# Patient Record
Sex: FEMALE | Race: WHITE | Employment: OTHER | URBAN - METROPOLITAN AREA
[De-identification: names, ages, dates, MRNs, and addresses within clinical notes are randomized per-mention and may not be internally consistent; named-entity substitution may affect disease eponyms.]

---

## 2018-03-23 ENCOUNTER — HOSPITAL ENCOUNTER (OUTPATIENT)
Dept: MAMMOGRAPHY | Age: 73
Discharge: HOME | End: 2018-03-23
Attending: FAMILY MEDICINE
Payer: MEDICARE

## 2018-03-23 ENCOUNTER — HOSPITAL ENCOUNTER (OUTPATIENT)
Dept: BONE DENSITY | Age: 73
Discharge: HOME | End: 2018-03-23
Attending: NURSE PRACTITIONER
Payer: MEDICARE

## 2018-03-23 DIAGNOSIS — Z78.0 POST-MENOPAUSAL: ICD-10-CM

## 2018-03-23 DIAGNOSIS — Z12.39 BREAST CANCER SCREENING: ICD-10-CM

## 2018-03-23 PROCEDURE — 77080 DXA BONE DENSITY AXIAL: CPT

## 2018-03-23 PROCEDURE — 77067 SCR MAMMO BI INCL CAD: CPT

## 2018-04-09 ENCOUNTER — HOSPITAL ENCOUNTER (OUTPATIENT)
Dept: MAMMOGRAPHY | Age: 73
Discharge: HOME | End: 2018-04-09
Attending: FAMILY MEDICINE
Payer: MEDICARE

## 2018-04-09 ENCOUNTER — HOSPITAL ENCOUNTER (OUTPATIENT)
Dept: ULTRASOUND IMAGING | Age: 73
Discharge: HOME | End: 2018-04-09
Attending: FAMILY MEDICINE
Payer: MEDICARE

## 2018-04-09 DIAGNOSIS — R92.8 ABNORMAL MAMMOGRAM: ICD-10-CM

## 2018-04-09 PROCEDURE — 77065 DX MAMMO INCL CAD UNI: CPT

## 2018-04-12 ENCOUNTER — HOSPITAL ENCOUNTER (OUTPATIENT)
Dept: MAMMOGRAPHY | Age: 73
Discharge: HOME | End: 2018-04-12
Attending: FAMILY MEDICINE
Payer: MEDICARE

## 2018-04-12 DIAGNOSIS — R92.8 ABNORMAL MAMMOGRAM: ICD-10-CM

## 2018-04-12 DIAGNOSIS — R92.0 MAMMOGRAPHIC MICROCALCIFICATION: ICD-10-CM

## 2018-04-12 PROCEDURE — 77065 DX MAMMO INCL CAD UNI: CPT

## 2018-04-23 ENCOUNTER — OFFICE VISIT (OUTPATIENT)
Dept: SURGERY | Age: 73
End: 2018-04-23

## 2018-04-23 VITALS — RESPIRATION RATE: 18 BRPM

## 2018-04-23 DIAGNOSIS — R92.0 MICROCALCIFICATIONS OF THE BREAST: Primary | ICD-10-CM

## 2018-04-23 RX ORDER — METOPROLOL TARTRATE 50 MG/1
50 TABLET ORAL DAILY
COMMUNITY

## 2018-04-23 RX ORDER — SIMVASTATIN 40 MG/1
40 TABLET, FILM COATED ORAL
COMMUNITY

## 2018-04-23 RX ORDER — METFORMIN HYDROCHLORIDE 500 MG/1
500 TABLET ORAL 2 TIMES DAILY WITH MEALS
COMMUNITY

## 2018-04-23 RX ORDER — LISINOPRIL AND HYDROCHLOROTHIAZIDE 12.5; 2 MG/1; MG/1
1 TABLET ORAL DAILY
COMMUNITY

## 2018-04-23 NOTE — MR AVS SNAPSHOT
[address] [facility] 
 
 
 [address] [facility] [address] 
[phone] Patient: Alina Holly MRN: [id number] :1945 Visit Information Date & Time Provider Department Dept. Phone Encounter #  
 2018  8:30 AM Osmin King MD [facility] [phone] Your Appointments 10/22/2018  9:45 AM  
Follow Up with Osmin King MD  
[address] [place] [facility]: 6 month fu  
 [address] [facility] [address]  
[phone]  
  
   
 [address] [facility] [address] Upcoming Health Maintenance Date Due Hepatitis C Screening 1945 DTaP/Tdap/Td series (1 - Tdap) 1968 FOBT Q 1 YEAR AGE 50-75 1997 ZOSTER VACCINE AGE 60> 2007 GLAUCOMA SCREENING Q2Y 2012 Pneumococcal 65+ Low/Medium Risk (1 of 2 - PCV13) 2012 Influenza Age 5 to Adult 2017 MEDICARE YEARLY EXAM 3/20/2018 BREAST CANCER SCRN MAMMOGRAM 2020 Allergies as of 2018  Review Complete On: 2018 By: Osmin King MD  
 Not on File Current Immunizations  Never Reviewed No immunizations on file. Not reviewed this visit Vitals Resp OB Status Smoking Status 18 Hysterectomy Former Smoker Your Updated Medication List  
  
   
This list is accurate as of 18  8:59 AM.  Always use your most recent med list.  
  
  
  
  
 ELIQUIS 5 mg tablet Generic drug:  apixaban Take 5 mg by mouth two (2) times a day. lisinopril-hydroCHLOROthiazide 20-12.5 mg per tablet Commonly known as:  Omar Sinning Take  by mouth daily. metFORMIN 500 mg tablet Commonly known as:  GLUCOPHAGE Take  by mouth two (2) times daily (with meals). metoprolol tartrate 50 mg tablet Commonly known as:  LOPRESSOR  
 Take  by mouth two (2) times a day. simvastatin 40 mg tablet Commonly known as:  ZOCOR Take  by mouth nightly. VICTOZA 3-LUIS 0.6 mg/0.1 mL (18 mg/3 mL) Pnij Generic drug:  Liraglutide 0.6 mg by SubCUTAneous route. Introducing [facility]! James Ricci introduces [employer] patient portal. Now you can access parts of your medical record, email your doctor's office, and request medication refills online. 1. In your internet browser, go to [email or web address] 2. Click on the First Time User? Click Here link in the Sign In box. You will see the New Member Sign Up page. 3. Enter your [employer] Access Code exactly as it appears below. You will not need to use this code after youve completed the sign-up process. If you do not sign up before the expiration date, you must request a new code. · [employer] Access Code: [email or web address] Expires: 6/13/2018  4:53 PM 
 
4. Enter the last four digits of your Social Security Number (xxxx) and Date of Birth (mm/dd/yyyy) as indicated and click Submit. You will be taken to the next sign-up page. 5. Create a [employer] ID. This will be your [employer] login ID and cannot be changed, so think of one that is secure and easy to remember. 6. Create a [employer] password. You can change your password at any time. 7. Enter your Password Reset Question and Answer. This can be used at a later time if you forget your password. 8. Enter your e-mail address. You will receive e-mail notification when new information is available in [address] 9. Click Sign Up. You can now view and download portions of your medical record. 10. Click the Download Summary menu link to download a portable copy of your medical information. If you have questions, please visit the Frequently Asked Questions section of the [employer] website. Remember, [employer] is NOT to be used for urgent needs. For medical emergencies, dial 911. Now available from your iPhone and Android! Please provide this summary of care documentation to your next provider. Your primary care clinician is listed as Olu Bui. If you have any questions after today's visit, please call [phone].

## 2018-04-23 NOTE — PROGRESS NOTES
Progress Note    Patient: Erinn Cordova  MRN: [id number]  SSN: [id number]   YOB: 1945  Age: 81 y.o. Sex: female     Chief Complaint   Patient presents with    Breast Problem     abnormal mammo birads 4       HPI    Ms. Ashley Ness is a 3-year-old woman who presents with some suspicious microcalcifications at the 2 o'clock position 8 cm from the nipple. Radiology put her in the stereotactic table to try and biopsied these and noted that she had several large blood vessels in the way. They deferred that biopsy at that point and told the patient she could come back in 6 months for a repeat mammogram.  She is here today for evaluation. She has no risk factors for breast cancer except for a prior biopsy. Her menarche was at 17 and first child at 23. There is no breast cancer, gastric cancer, pancreas cancer, ovarian cancer or prostate cancer in her family. I have discussed with her what microcalcifications are and how they look. I told her that 85% or more of microcalcifications or a benign entity not related to cancer. She understands all this and will see her back in 6 months with a repeat mammogram.  I have personally reviewed the images of her mammogram and the addendum that comes with the reading. Past Medical History:   Diagnosis Date    Hypertension      History reviewed. No pertinent surgical history. Not on File  Current Outpatient Prescriptions   Medication Sig Dispense Refill    metFORMIN (GLUCOPHAGE) 500 mg tablet Take  by mouth two (2) times daily (with meals).  lisinopril-hydroCHLOROthiazide (PRINZIDE, ZESTORETIC) 20-12.5 mg per tablet Take  by mouth daily.  simvastatin (ZOCOR) 40 mg tablet Take  by mouth nightly.  metoprolol tartrate (LOPRESSOR) 50 mg tablet Take  by mouth two (2) times a day.  Liraglutide (VICTOZA 3-LUIS) 0.6 mg/0.1 mL (18 mg/3 mL) pnij 0.6 mg by SubCUTAneous route.  apixaban (ELIQUIS) 5 mg tablet Take 5 mg by mouth two (2) times a day. Social History     Social History    Marital status:      Spouse name: N/A    Number of children: N/A    Years of education: N/A     Occupational History    Not on file. Social History Main Topics    Smoking status: Former Smoker    Smokeless tobacco: Never Used    Alcohol use Not on file    Drug use: Not on file    Sexual activity: Not on file     Other Topics Concern    Not on file     Social History Narrative     Family History   Problem Relation Age of Onset    Cancer Mother 72     lung         Review of systems:  Patient denies any reflux, emesis, abdominal pain, change in bowel habits, hematochezia, melena, fever, weight loss, fatigue chills, dermatitis, abnormal moles, change in vision, vertigo, epistaxis, dysphagia, hoarseness, chest pain, palpitations, hypertension, edema, cough, shortness of breath, wheezing, hemoptysis, snoring, hematuria, diabetes, thyroid disease, anemia, bruising, history of blood transfusion, dizziness, headache, or fainting.     Physical Examination    Well developed well nourished female in no apparent distress  Visit Vitals    Resp 18      Head: normocephalic, atraumatic  Mouth: Clear, no overt lesions, oral mucosa pink and moist  Neck: supple, no masses, no adenopathy or carotid bruits, trachea midline  Resp: clear to auscultation bilaterally, no wheeze, rhonchi or rales, excursions normal and symmetrical  Cardio: Regular rate and rhythm, no murmurs, clicks, gallops or rubs, no edema or varicosities  Abdomen: soft, nontender, nondistended, normoactive bowel sounds, no hernias, no hepatosplenomegaly,   Back: Deferred  Extremeties: warm, well-perfused, no tenderness or swelling, normal gait/station  Neuro: sensation and strength grossly intact and symmetrical  Psych: alert and oriented to person, place and time  Breast exam bilateral breast exam without dominant mass, skin change, nipple discharge, or lymphadenopathy    IMPRESSION  Microcalcifications left breast    PLAN  Orders Placed This Encounter    metFORMIN (GLUCOPHAGE) 500 mg tablet     Sig: Take  by mouth two (2) times daily (with meals).  lisinopril-hydroCHLOROthiazide (PRINZIDE, ZESTORETIC) 20-12.5 mg per tablet     Sig: Take  by mouth daily.  simvastatin (ZOCOR) 40 mg tablet     Sig: Take  by mouth nightly.  metoprolol tartrate (LOPRESSOR) 50 mg tablet     Sig: Take  by mouth two (2) times a day.  Liraglutide (VICTOZA 3-LUIS) 0.6 mg/0.1 mL (18 mg/3 mL) pnij     Si.6 mg by SubCUTAneous route.  apixaban (ELIQUIS) 5 mg tablet     Sig: Take 5 mg by mouth two (2) times a day.      Follow-up in 6 months with diagnostic mammogram, per radiology  Chad Amaya MD

## 2018-10-15 ENCOUNTER — HOSPITAL ENCOUNTER (OUTPATIENT)
Dept: MAMMOGRAPHY | Age: 73
Discharge: HOME | End: 2018-10-15
Payer: MEDICARE

## 2018-10-15 DIAGNOSIS — R92.0 MICROCALCIFICATIONS OF THE BREAST: ICD-10-CM

## 2018-10-15 PROCEDURE — 77066 DX MAMMO INCL CAD BI: CPT

## 2018-11-02 ENCOUNTER — OFFICE VISIT (OUTPATIENT)
Dept: SURGERY | Age: 73
End: 2018-11-02

## 2018-11-02 VITALS
DIASTOLIC BLOOD PRESSURE: 81 MMHG | HEIGHT: 66 IN | RESPIRATION RATE: 18 BRPM | HEART RATE: 78 BPM | WEIGHT: 245 LBS | BODY MASS INDEX: 39.37 KG/M2 | OXYGEN SATURATION: 97 % | SYSTOLIC BLOOD PRESSURE: 139 MMHG

## 2018-11-02 DIAGNOSIS — C50.912 LOBULAR CARCINOMA OF LEFT BREAST (HCC): Primary | ICD-10-CM

## 2018-11-02 NOTE — H&P (VIEW-ONLY)
Progress Note Patient: Roberta Crowell  MRN: [id number]  SSN: [id number] YOB: 1945  Age: 72 y.o. Sex: female Chief Complaint Patient presents with  Follow-up HPI Ms. Mireya Magaña is a 69-year and I have seen in the past with a abnormal mammogram on the left. This was a collection of microcalcifications and was attempted to be biopsied by radiology however vascular structures in the way prevented the biopsy from occurring. When discussed with them they changed her BI-RADS rating 2-3. She is now 6 months out from that and her mammogram shows a BI-RADS 3 rating on the left however a new cluster of microcalcifications on the right was rated as BI-RADS 4. Unfortunately this cluster is too close to her chest wall for stereotactic biopsy and would not show up on an ultrasound-guided biopsy. Past Medical History:  
Diagnosis Date  Hypertension History reviewed. No pertinent surgical history. Not on File Current Outpatient Medications Medication Sig Dispense Refill  metFORMIN (GLUCOPHAGE) 500 mg tablet Take  by mouth two (2) times daily (with meals).  lisinopril-hydroCHLOROthiazide (PRINZIDE, ZESTORETIC) 20-12.5 mg per tablet Take  by mouth daily.  simvastatin (ZOCOR) 40 mg tablet Take  by mouth nightly.  metoprolol tartrate (LOPRESSOR) 50 mg tablet Take  by mouth two (2) times a day.  Liraglutide (VICTOZA 3-LUIS) 0.6 mg/0.1 mL (18 mg/3 mL) pnij 0.6 mg by SubCUTAneous route.  apixaban (ELIQUIS) 5 mg tablet Take 5 mg by mouth two (2) times a day. Social History Socioeconomic History  Marital status:  Spouse name: Not on file  Number of children: Not on file  Years of education: Not on file  Highest education level: Not on file Social Needs  Financial resource strain: Not on file  Food insecurity - worry: Not on file  Food insecurity - inability: Not on file  Transportation needs - medical: Not on file  Transportation needs - non-medical: Not on file Occupational History  Not on file Tobacco Use  Smoking status: Former Smoker  Smokeless tobacco: Never Used Substance and Sexual Activity  Alcohol use: Not on file  Drug use: Not on file  Sexual activity: Not on file Other Topics Concern  Not on file Social History Narrative  Not on file Family History Problem Relation Age of Onset  Cancer Mother 72  
     lung Review of systems: 
Patient denies any reflux, emesis, abdominal pain, change in bowel habits, hematochezia, melena, fever, weight loss, fatigue chills, dermatitis, abnormal moles, change in vision, vertigo, epistaxis, dysphagia, hoarseness, chest pain, palpitations, hypertension, edema, cough, shortness of breath, wheezing, hemoptysis, snoring, hematuria, diabetes, thyroid disease, anemia, bruising, history of blood transfusion, dizziness, headache, or fainting. Physical Examination Well developed well nourished female in no apparent distress Visit Vitals /81 Pulse 78 Resp 18 Ht 5' 6\" (1.676 m) Wt 111.1 kg (245 lb) SpO2 97% BMI 39.54 kg/m² Head: normocephalic, atraumatic Mouth: Clear, no overt lesions, oral mucosa pink and moist 
Neck: supple, no masses, no adenopathy or carotid bruits, trachea midline Resp: clear to auscultation bilaterally, no wheeze, rhonchi or rales, excursions normal and symmetrical 
Cardio: Regular rate and rhythm, no murmurs, clicks, gallops or rubs, no edema or varicosities Abdomen: soft, nontender, nondistended, normoactive bowel sounds, no hernias, no hepatosplenomegaly,  
Back: Deferred Extremeties: warm, well-perfused, no tenderness or swelling, normal gait/station Neuro: sensation and strength grossly intact and symmetrical 
Psych: alert and oriented to person, place and time Breast exam deferred IMPRESSION New suspicious microcalcifications right breast 
 
PLAN 
 No orders of the defined types were placed in this encounter. Needle localized right breast biopsy after holding Eliquis for 3 days Sandra Aguilar MD

## 2018-11-05 DIAGNOSIS — C50.911 LOBULAR CARCINOMA OF RIGHT BREAST (HCC): Primary | ICD-10-CM

## 2018-11-06 ENCOUNTER — HOSPITAL ENCOUNTER (OUTPATIENT)
Dept: PREADMISSION TESTING | Age: 73
Discharge: HOME | End: 2018-11-06
Payer: MEDICARE

## 2018-11-06 DIAGNOSIS — C50.912 LOBULAR CARCINOMA OF LEFT BREAST (HCC): ICD-10-CM

## 2018-11-06 LAB
ANION GAP SERPL CALC-SCNC: 7 MMOL/L (ref 3–18)
BASOPHILS # BLD: 0 K/UL (ref 0–0.1)
BASOPHILS NFR BLD: 0 % (ref 0–2)
BUN SERPL-MCNC: 25 MG/DL (ref 7–18)
BUN/CREAT SERPL: 21 (ref 12–20)
CALCIUM SERPL-MCNC: 8.5 MG/DL (ref 8.5–10.1)
CHLORIDE SERPL-SCNC: 109 MMOL/L (ref 100–108)
CO2 SERPL-SCNC: 25 MMOL/L (ref 21–32)
CREAT SERPL-MCNC: 1.21 MG/DL (ref 0.6–1.3)
DIFFERENTIAL METHOD BLD: ABNORMAL
EOSINOPHIL # BLD: 0 K/UL (ref 0–0.4)
EOSINOPHIL NFR BLD: 1 % (ref 0–5)
ERYTHROCYTE [DISTWIDTH] IN BLOOD BY AUTOMATED COUNT: 14.2 % (ref 11.6–14.5)
GLUCOSE SERPL-MCNC: 115 MG/DL (ref 74–99)
HCT VFR BLD AUTO: 37.9 % (ref 35–45)
HGB BLD-MCNC: 12.1 G/DL (ref 12–16)
LYMPHOCYTES # BLD: 1.4 K/UL (ref 0.9–3.6)
LYMPHOCYTES NFR BLD: 24 % (ref 21–52)
MCH RBC QN AUTO: 29.8 PG (ref 24–34)
MCHC RBC AUTO-ENTMCNC: 31.9 G/DL (ref 31–37)
MCV RBC AUTO: 93.3 FL (ref 74–97)
MONOCYTES # BLD: 0.4 K/UL (ref 0.05–1.2)
MONOCYTES NFR BLD: 6 % (ref 3–10)
NEUTS SEG # BLD: 3.9 K/UL (ref 1.8–8)
NEUTS SEG NFR BLD: 69 % (ref 40–73)
PLATELET # BLD AUTO: 188 K/UL (ref 135–420)
PMV BLD AUTO: 10.5 FL (ref 9.2–11.8)
POTASSIUM SERPL-SCNC: 4.7 MMOL/L (ref 3.5–5.5)
RBC # BLD AUTO: 4.06 M/UL (ref 4.2–5.3)
SODIUM SERPL-SCNC: 141 MMOL/L (ref 136–145)
WBC # BLD AUTO: 5.6 K/UL (ref 4.6–13.2)

## 2018-11-06 PROCEDURE — 85025 COMPLETE CBC W/AUTO DIFF WBC: CPT

## 2018-11-06 PROCEDURE — 80048 BASIC METABOLIC PNL TOTAL CA: CPT

## 2018-11-06 PROCEDURE — 36415 COLL VENOUS BLD VENIPUNCTURE: CPT

## 2018-11-09 RX ORDER — ENOXAPARIN SODIUM 100 MG/ML
INJECTION SUBCUTANEOUS EVERY 12 HOURS
COMMUNITY
End: 2018-12-05

## 2018-11-12 ENCOUNTER — ANESTHESIA EVENT (OUTPATIENT)
Dept: SURGERY | Age: 73
End: 2018-11-12
Payer: MEDICARE

## 2018-11-13 ENCOUNTER — APPOINTMENT (OUTPATIENT)
Dept: MAMMOGRAPHY | Age: 73
End: 2018-11-13
Payer: MEDICARE

## 2018-11-13 ENCOUNTER — ANESTHESIA (OUTPATIENT)
Dept: SURGERY | Age: 73
End: 2018-11-13
Payer: MEDICARE

## 2018-11-13 ENCOUNTER — HOSPITAL ENCOUNTER (OUTPATIENT)
Age: 73
Setting detail: OUTPATIENT SURGERY
Discharge: HOME | End: 2018-11-13
Payer: MEDICARE

## 2018-11-13 VITALS
SYSTOLIC BLOOD PRESSURE: 136 MMHG | BODY MASS INDEX: 36.96 KG/M2 | HEIGHT: 66 IN | TEMPERATURE: 97.7 F | OXYGEN SATURATION: 95 % | HEART RATE: 74 BPM | DIASTOLIC BLOOD PRESSURE: 68 MMHG | WEIGHT: 230 LBS | RESPIRATION RATE: 12 BRPM

## 2018-11-13 DIAGNOSIS — R92.0 MAMMOGRAPHIC MICROCALCIFICATION: ICD-10-CM

## 2018-11-13 DIAGNOSIS — R92.8 ABNORMAL MAMMOGRAM: ICD-10-CM

## 2018-11-13 LAB
GLUCOSE BLD STRIP.AUTO-MCNC: 113 MG/DL (ref 70–110)
GLUCOSE BLD STRIP.AUTO-MCNC: 125 MG/DL (ref 70–110)

## 2018-11-13 PROCEDURE — 74011250636 HC RX REV CODE- 250/636

## 2018-11-13 PROCEDURE — 76210000020 HC REC RM PH II FIRST 0.5 HR

## 2018-11-13 PROCEDURE — 76210000006 HC OR PH I REC 0.5 TO 1 HR

## 2018-11-13 PROCEDURE — 77030020782 HC GWN BAIR PAWS FLX 3M -B

## 2018-11-13 PROCEDURE — 77030032490 HC SLV COMPR SCD KNE COVD -B

## 2018-11-13 PROCEDURE — 74011250636 HC RX REV CODE- 250/636: Performed by: NURSE ANESTHETIST, CERTIFIED REGISTERED

## 2018-11-13 PROCEDURE — 74011000250 HC RX REV CODE- 250

## 2018-11-13 PROCEDURE — 77030013567 HC DRN WND RESERV BARD -A

## 2018-11-13 PROCEDURE — 74011250637 HC RX REV CODE- 250/637: Performed by: NURSE ANESTHETIST, CERTIFIED REGISTERED

## 2018-11-13 PROCEDURE — 76060000033 HC ANESTHESIA 1 TO 1.5 HR

## 2018-11-13 PROCEDURE — 82962 GLUCOSE BLOOD TEST: CPT

## 2018-11-13 PROCEDURE — 19281 PERQ DEVICE BREAST 1ST IMAG: CPT

## 2018-11-13 PROCEDURE — 77030002916 HC SUT ETHLN J&J -A

## 2018-11-13 PROCEDURE — 88307 TISSUE EXAM BY PATHOLOGIST: CPT

## 2018-11-13 PROCEDURE — 76010000149 HC OR TIME 1 TO 1.5 HR

## 2018-11-13 PROCEDURE — 77030002996 HC SUT SLK J&J -A

## 2018-11-13 PROCEDURE — 77030031139 HC SUT VCRL2 J&J -A

## 2018-11-13 PROCEDURE — 77030003028 HC SUT VCRL J&J -A

## 2018-11-13 PROCEDURE — 77030002933 HC SUT MCRYL J&J -A

## 2018-11-13 PROCEDURE — 77030008462 HC STPLR SKN PROX J&J -A

## 2018-11-13 PROCEDURE — 77030013079 HC BLNKT BAIR HGGR 3M -A

## 2018-11-13 PROCEDURE — 77030012406 HC DRN WND PENRS BARD -A

## 2018-11-13 RX ORDER — MORPHINE SULFATE 4 MG/ML
4 INJECTION INTRAVENOUS
Status: DISCONTINUED | OUTPATIENT
Start: 2018-11-13 | End: 2018-11-13 | Stop reason: HOSPADM

## 2018-11-13 RX ORDER — SODIUM CHLORIDE, SODIUM LACTATE, POTASSIUM CHLORIDE, CALCIUM CHLORIDE 600; 310; 30; 20 MG/100ML; MG/100ML; MG/100ML; MG/100ML
75 INJECTION, SOLUTION INTRAVENOUS CONTINUOUS
Status: DISCONTINUED | OUTPATIENT
Start: 2018-11-13 | End: 2018-11-13 | Stop reason: HOSPADM

## 2018-11-13 RX ORDER — CEFAZOLIN SODIUM 2 G/50ML
2 SOLUTION INTRAVENOUS ONCE
Status: COMPLETED | OUTPATIENT
Start: 2018-11-13 | End: 2018-11-13

## 2018-11-13 RX ORDER — SODIUM CHLORIDE 0.9 % (FLUSH) 0.9 %
5-10 SYRINGE (ML) INJECTION EVERY 8 HOURS
Status: DISCONTINUED | OUTPATIENT
Start: 2018-11-13 | End: 2018-11-13 | Stop reason: HOSPADM

## 2018-11-13 RX ORDER — SODIUM CHLORIDE 0.9 % (FLUSH) 0.9 %
5-10 SYRINGE (ML) INJECTION AS NEEDED
Status: DISCONTINUED | OUTPATIENT
Start: 2018-11-13 | End: 2018-11-13 | Stop reason: HOSPADM

## 2018-11-13 RX ORDER — MIDAZOLAM HYDROCHLORIDE 1 MG/ML
INJECTION, SOLUTION INTRAMUSCULAR; INTRAVENOUS AS NEEDED
Status: DISCONTINUED | OUTPATIENT
Start: 2018-11-13 | End: 2018-11-13 | Stop reason: HOSPADM

## 2018-11-13 RX ORDER — INSULIN LISPRO 100 [IU]/ML
INJECTION, SOLUTION INTRAVENOUS; SUBCUTANEOUS ONCE
Status: DISCONTINUED | OUTPATIENT
Start: 2018-11-13 | End: 2018-11-13 | Stop reason: HOSPADM

## 2018-11-13 RX ORDER — PROPOFOL 10 MG/ML
INJECTION, EMULSION INTRAVENOUS AS NEEDED
Status: DISCONTINUED | OUTPATIENT
Start: 2018-11-13 | End: 2018-11-13 | Stop reason: HOSPADM

## 2018-11-13 RX ORDER — EPHEDRINE SULFATE 50 MG/ML
INJECTION, SOLUTION INTRAVENOUS AS NEEDED
Status: DISCONTINUED | OUTPATIENT
Start: 2018-11-13 | End: 2018-11-13 | Stop reason: HOSPADM

## 2018-11-13 RX ORDER — FENTANYL CITRATE 50 UG/ML
INJECTION, SOLUTION INTRAMUSCULAR; INTRAVENOUS AS NEEDED
Status: DISCONTINUED | OUTPATIENT
Start: 2018-11-13 | End: 2018-11-13 | Stop reason: HOSPADM

## 2018-11-13 RX ORDER — LIDOCAINE HYDROCHLORIDE 10 MG/ML
0.1 INJECTION, SOLUTION EPIDURAL; INFILTRATION; INTRACAUDAL; PERINEURAL AS NEEDED
Status: DISCONTINUED | OUTPATIENT
Start: 2018-11-13 | End: 2018-11-13 | Stop reason: HOSPADM

## 2018-11-13 RX ORDER — DEXAMETHASONE SODIUM PHOSPHATE 4 MG/ML
INJECTION, SOLUTION INTRA-ARTICULAR; INTRALESIONAL; INTRAMUSCULAR; INTRAVENOUS; SOFT TISSUE AS NEEDED
Status: DISCONTINUED | OUTPATIENT
Start: 2018-11-13 | End: 2018-11-13 | Stop reason: HOSPADM

## 2018-11-13 RX ORDER — ONDANSETRON 2 MG/ML
4 INJECTION INTRAMUSCULAR; INTRAVENOUS ONCE
Status: DISCONTINUED | OUTPATIENT
Start: 2018-11-13 | End: 2018-11-13 | Stop reason: HOSPADM

## 2018-11-13 RX ORDER — MAGNESIUM SULFATE 100 %
4 CRYSTALS MISCELLANEOUS AS NEEDED
Status: DISCONTINUED | OUTPATIENT
Start: 2018-11-13 | End: 2018-11-13 | Stop reason: HOSPADM

## 2018-11-13 RX ORDER — OXYCODONE AND ACETAMINOPHEN 5; 325 MG/1; MG/1
1 TABLET ORAL
Qty: 30 TAB | Refills: 0 | Status: SHIPPED | OUTPATIENT
Start: 2018-11-13 | End: 2019-01-26 | Stop reason: ALTCHOICE

## 2018-11-13 RX ORDER — ONDANSETRON 2 MG/ML
INJECTION INTRAMUSCULAR; INTRAVENOUS AS NEEDED
Status: DISCONTINUED | OUTPATIENT
Start: 2018-11-13 | End: 2018-11-13

## 2018-11-13 RX ORDER — MORPHINE SULFATE 4 MG/ML
INJECTION INTRAVENOUS
Status: COMPLETED
Start: 2018-11-13 | End: 2018-11-13

## 2018-11-13 RX ORDER — LIDOCAINE HYDROCHLORIDE 20 MG/ML
INJECTION, SOLUTION EPIDURAL; INFILTRATION; INTRACAUDAL; PERINEURAL AS NEEDED
Status: DISCONTINUED | OUTPATIENT
Start: 2018-11-13 | End: 2018-11-13 | Stop reason: HOSPADM

## 2018-11-13 RX ORDER — DEXTROSE 50 % IN WATER (D50W) INTRAVENOUS SYRINGE
25-50 AS NEEDED
Status: DISCONTINUED | OUTPATIENT
Start: 2018-11-13 | End: 2018-11-13 | Stop reason: HOSPADM

## 2018-11-13 RX ORDER — BUPIVACAINE HYDROCHLORIDE 2.5 MG/ML
INJECTION, SOLUTION EPIDURAL; INFILTRATION; INTRACAUDAL AS NEEDED
Status: DISCONTINUED | OUTPATIENT
Start: 2018-11-13 | End: 2018-11-13 | Stop reason: HOSPADM

## 2018-11-13 RX ORDER — ONDANSETRON 2 MG/ML
INJECTION INTRAMUSCULAR; INTRAVENOUS AS NEEDED
Status: DISCONTINUED | OUTPATIENT
Start: 2018-11-13 | End: 2018-11-13 | Stop reason: HOSPADM

## 2018-11-13 RX ORDER — FAMOTIDINE 20 MG/1
20 TABLET, FILM COATED ORAL ONCE
Status: COMPLETED | OUTPATIENT
Start: 2018-11-13 | End: 2018-11-13

## 2018-11-13 RX ADMIN — FENTANYL CITRATE 25 MCG: 50 INJECTION, SOLUTION INTRAMUSCULAR; INTRAVENOUS at 15:12

## 2018-11-13 RX ADMIN — FENTANYL CITRATE 25 MCG: 50 INJECTION, SOLUTION INTRAMUSCULAR; INTRAVENOUS at 15:20

## 2018-11-13 RX ADMIN — ONDANSETRON 4 MG: 2 INJECTION INTRAMUSCULAR; INTRAVENOUS at 15:45

## 2018-11-13 RX ADMIN — EPHEDRINE SULFATE 10 MG: 50 INJECTION, SOLUTION INTRAVENOUS at 15:44

## 2018-11-13 RX ADMIN — CEFAZOLIN SODIUM 2 G: 2 SOLUTION INTRAVENOUS at 14:53

## 2018-11-13 RX ADMIN — MORPHINE SULFATE 4 MG: 4 INJECTION INTRAVENOUS at 16:30

## 2018-11-13 RX ADMIN — FENTANYL CITRATE 25 MCG: 50 INJECTION, SOLUTION INTRAMUSCULAR; INTRAVENOUS at 15:30

## 2018-11-13 RX ADMIN — LIDOCAINE HYDROCHLORIDE 100 MG: 20 INJECTION, SOLUTION EPIDURAL; INFILTRATION; INTRACAUDAL; PERINEURAL at 14:56

## 2018-11-13 RX ADMIN — SODIUM CHLORIDE, SODIUM LACTATE, POTASSIUM CHLORIDE, AND CALCIUM CHLORIDE 75 ML/HR: 600; 310; 30; 20 INJECTION, SOLUTION INTRAVENOUS at 13:48

## 2018-11-13 RX ADMIN — FAMOTIDINE 20 MG: 20 TABLET ORAL at 08:54

## 2018-11-13 RX ADMIN — DEXAMETHASONE SODIUM PHOSPHATE 4 MG: 4 INJECTION, SOLUTION INTRA-ARTICULAR; INTRALESIONAL; INTRAMUSCULAR; INTRAVENOUS; SOFT TISSUE at 14:59

## 2018-11-13 RX ADMIN — EPHEDRINE SULFATE 5 MG: 50 INJECTION, SOLUTION INTRAVENOUS at 15:54

## 2018-11-13 RX ADMIN — MIDAZOLAM HYDROCHLORIDE 1 MG: 1 INJECTION, SOLUTION INTRAMUSCULAR; INTRAVENOUS at 14:49

## 2018-11-13 RX ADMIN — SODIUM CHLORIDE, SODIUM LACTATE, POTASSIUM CHLORIDE, AND CALCIUM CHLORIDE: 600; 310; 30; 20 INJECTION, SOLUTION INTRAVENOUS at 14:50

## 2018-11-13 RX ADMIN — MIDAZOLAM HYDROCHLORIDE 1 MG: 1 INJECTION, SOLUTION INTRAMUSCULAR; INTRAVENOUS at 14:51

## 2018-11-13 RX ADMIN — FENTANYL CITRATE 25 MCG: 50 INJECTION, SOLUTION INTRAMUSCULAR; INTRAVENOUS at 16:00

## 2018-11-13 RX ADMIN — PROPOFOL 120 MG: 10 INJECTION, EMULSION INTRAVENOUS at 14:56

## 2018-11-13 NOTE — INTERVAL H&P NOTE
H&P Update:  Jonah Velazquez was seen and examined. History and physical has been reviewed. The patient has been examined.  There have been no significant clinical changes since the completion of the originally dated History and Physical.    Signed By: Chantel Carrington MD     November 13, 2018 2:56 PM

## 2018-11-13 NOTE — ANESTHESIA PREPROCEDURE EVALUATION
Anesthetic History No history of anesthetic complications Review of Systems / Medical History Patient summary reviewed and pertinent labs reviewed Pulmonary Within defined limits Neuro/Psych Within defined limits Cardiovascular Hypertension: well controlled Dysrhythmias : atrial fibrillation CAD 
 
 
  
GI/Hepatic/Renal 
Within defined limits Endo/Other Diabetes: well controlled, type 2 Cancer Other Findings Physical Exam 
 
Airway Mallampati: III 
TM Distance: 4 - 6 cm Neck ROM: decreased range of motion Mouth opening: Normal 
 
 Cardiovascular Rhythm: regular Rate: normal 
 
 
 
 Dental 
No notable dental hx Pulmonary Breath sounds clear to auscultation Abdominal 
GI exam deferred Other Findings Anesthetic Plan ASA: 3 Anesthesia type: general 
 
 
 
 
Induction: Intravenous Anesthetic plan and risks discussed with: Patient

## 2018-11-14 NOTE — ANESTHESIA POSTPROCEDURE EVALUATION
Procedure(s): RIGHT BREAST BIOPSY WITH NEEDLE LOCALIZATION (1030). Anesthesia Post Evaluation Multimodal analgesia: multimodal analgesia used between 6 hours prior to anesthesia start to PACU discharge Patient location during evaluation: PACU Patient participation: complete - patient participated Level of consciousness: awake Pain management: satisfactory to patient Airway patency: patent Anesthetic complications: no 
Cardiovascular status: acceptable Respiratory status: acceptable Hydration status: acceptable Visit Vitals /68 Pulse 74 Temp 36.5 °C (97.7 °F) Resp 12 Ht 5' 6\" (1.676 m) Wt 104.3 kg (230 lb) SpO2 95% BMI 37.12 kg/m²

## 2018-11-15 RX ORDER — ONDANSETRON 4 MG/1
4 TABLET, ORALLY DISINTEGRATING ORAL
Qty: 15 TAB | Refills: 0 | Status: SHIPPED | OUTPATIENT
Start: 2018-11-15 | End: 2019-01-26 | Stop reason: ALTCHOICE

## 2018-11-15 NOTE — OP NOTES
[facility]  OPERATIVE REPORT    Rin Laughlin  MR#: [id number]  : 1945  ACCOUNT #: [de-identified]   DATE OF SERVICE: 2018    PREOPERATIVE DIAGNOSIS:  Abnormal mammogram.    POSTOPERATIVE DIAGNOSIS:  Abnormal mammogram.     PROCEDURE PERFORMED:  Needle localized right breast excisional biopsy. SURGEON:  Duane Scarlet, MD    ASSISTANT:      ESTIMATED BLOOD LOSS:  Minimal.    COMPLICATIONS:  None. ANESTHESIA:  General.    SPECIMENS REMOVED:  Right breast tissue with microcalcifications    IMPLANTS:  None. INDICATIONS:  The patient is a 42-year-old woman with abnormal mammogram showing microcalcifications very close to the chest wall and amenable for stereotactic core needle biopsy method. She is here for needle localization and excisional biopsy. PROCEDURE:  After appropriate antibiotic and sequential compression stocking, the patient was taken to the operating room and placed in supine position on the OR table. After adequate general anesthesia, her right breast was prepped and draped to CDC standard. The localization had been from the lateral aspect and a laterally based curvilinear incision was performed. Bovie electrocautery was used to remove a large amount of breast tissue including the wire and the microcalcifications. Copious irrigation was carried out and suctioned free. One area with a small vein was tied with 3-0 Vicryl. Hemostasis was good and the wound after irrigation was closed with 3-0 Vicryl and 4-0 Monocryl. The breast specimen was taken down to specimen mammography and the microcalcifications were indeed in the specimen. The patient tolerated the procedure well and was taken to recovery in stable condition.       MD BOAZ Figueroa / FRANKIE  D: 2018 12:56     T: 2018 19:40  JOB #: [id number]

## 2018-11-18 ENCOUNTER — APPOINTMENT (OUTPATIENT)
Dept: GENERAL RADIOLOGY | Age: 73
DRG: 919 | End: 2018-11-18
Attending: EMERGENCY MEDICINE
Payer: MEDICARE

## 2018-11-18 ENCOUNTER — APPOINTMENT (OUTPATIENT)
Dept: CT IMAGING | Age: 73
DRG: 919 | End: 2018-11-18
Attending: EMERGENCY MEDICINE
Payer: MEDICARE

## 2018-11-18 ENCOUNTER — HOSPITAL ENCOUNTER (INPATIENT)
Age: 73
LOS: 2 days | Discharge: HOME | DRG: 919 | End: 2018-11-20
Attending: EMERGENCY MEDICINE
Payer: MEDICARE

## 2018-11-18 DIAGNOSIS — I48.91 ATRIAL FIBRILLATION, UNSPECIFIED TYPE (HCC): ICD-10-CM

## 2018-11-18 DIAGNOSIS — I95.9 HYPOTENSION, UNSPECIFIED HYPOTENSION TYPE: Primary | ICD-10-CM

## 2018-11-18 PROBLEM — D62 ACUTE BLOOD LOSS ANEMIA: Status: ACTIVE | Noted: 2018-11-18

## 2018-11-18 LAB
ALBUMIN SERPL-MCNC: 2.5 G/DL (ref 3.4–5)
ALBUMIN/GLOB SERPL: 0.8 {RATIO} (ref 0.8–1.7)
ALP SERPL-CCNC: 67 U/L (ref 45–117)
ALT SERPL-CCNC: 14 U/L (ref 13–56)
AMORPH CRY URNS QL MICRO: ABNORMAL
ANION GAP SERPL CALC-SCNC: 13 MMOL/L (ref 3–18)
ANION GAP SERPL CALC-SCNC: 8 MMOL/L (ref 3–18)
APPEARANCE UR: ABNORMAL
APTT PPP: 40.6 SEC (ref 23–36.4)
AST SERPL-CCNC: 11 U/L (ref 15–37)
ATRIAL RATE: 73 BPM
BACTERIA URNS QL MICRO: ABNORMAL /HPF
BASOPHILS # BLD: 0 K/UL (ref 0–0.06)
BASOPHILS NFR BLD: 0 % (ref 0–3)
BILIRUB SERPL-MCNC: 0.8 MG/DL (ref 0.2–1)
BILIRUB UR QL: NEGATIVE
BUN SERPL-MCNC: 86 MG/DL (ref 7–18)
BUN SERPL-MCNC: 97 MG/DL (ref 7–18)
BUN/CREAT SERPL: 16 (ref 12–20)
BUN/CREAT SERPL: 19 (ref 12–20)
CA-I SERPL-SCNC: 0.99 MMOL/L (ref 1.12–1.32)
CALCIUM SERPL-MCNC: 6.5 MG/DL (ref 8.5–10.1)
CALCIUM SERPL-MCNC: 7.5 MG/DL (ref 8.5–10.1)
CALCULATED P AXIS, ECG09: 15 DEGREES
CALCULATED R AXIS, ECG10: -3 DEGREES
CALCULATED T AXIS, ECG11: 9 DEGREES
CHLORIDE SERPL-SCNC: 107 MMOL/L (ref 100–108)
CHLORIDE SERPL-SCNC: 115 MMOL/L (ref 100–108)
CK MB CFR SERPL CALC: NORMAL % (ref 0–4)
CK MB SERPL-MCNC: <1 NG/ML (ref 5–25)
CK SERPL-CCNC: 132 U/L (ref 26–192)
CO2 SERPL-SCNC: 17 MMOL/L (ref 21–32)
CO2 SERPL-SCNC: 18 MMOL/L (ref 21–32)
COLOR UR: YELLOW
CREAT SERPL-MCNC: 4.62 MG/DL (ref 0.6–1.3)
CREAT SERPL-MCNC: 5.95 MG/DL (ref 0.6–1.3)
CREAT UR-MCNC: 148 MG/DL (ref 30–125)
CREAT UR-MCNC: 148 MG/DL (ref 30–125)
DIAGNOSIS, 93000: NORMAL
DIFFERENTIAL METHOD BLD: ABNORMAL
EOSINOPHIL # BLD: 0 K/UL (ref 0–0.4)
EOSINOPHIL NFR BLD: 0 % (ref 0–5)
EPITH CASTS URNS QL MICRO: ABNORMAL /LPF (ref 0–5)
ERYTHROCYTE [DISTWIDTH] IN BLOOD BY AUTOMATED COUNT: 15.5 % (ref 11.6–14.5)
GLOBULIN SER CALC-MCNC: 3.2 G/DL (ref 2–4)
GLUCOSE SERPL-MCNC: 117 MG/DL (ref 74–99)
GLUCOSE SERPL-MCNC: 217 MG/DL (ref 74–99)
GLUCOSE UR STRIP.AUTO-MCNC: NEGATIVE MG/DL
HCT VFR BLD AUTO: 18.5 % (ref 35–45)
HCT VFR BLD AUTO: 20.4 % (ref 35–45)
HGB BLD-MCNC: 6.1 G/DL (ref 12–16)
HGB BLD-MCNC: 6.8 G/DL (ref 12–16)
HGB UR QL STRIP: NEGATIVE
INR PPP: 1.3 (ref 0.8–1.2)
KETONES UR QL STRIP.AUTO: NEGATIVE MG/DL
LACTATE BLD-SCNC: 2.22 MMOL/L (ref 0.4–2)
LEUKOCYTE ESTERASE UR QL STRIP.AUTO: NEGATIVE
LYMPHOCYTES # BLD: 1.5 K/UL (ref 0.8–3.5)
LYMPHOCYTES NFR BLD: 17 % (ref 20–51)
MAGNESIUM SERPL-MCNC: 2.1 MG/DL (ref 1.6–2.6)
MAGNESIUM SERPL-MCNC: 2.4 MG/DL (ref 1.6–2.6)
MCH RBC QN AUTO: 30.3 PG (ref 24–34)
MCHC RBC AUTO-ENTMCNC: 33 G/DL (ref 31–37)
MCV RBC AUTO: 92 FL (ref 74–97)
MONOCYTES # BLD: 0.4 K/UL (ref 0–1)
MONOCYTES NFR BLD: 4 % (ref 2–9)
NEUTS BAND NFR BLD MANUAL: 4 % (ref 0–5)
NEUTS SEG # BLD: 7.2 K/UL (ref 1.8–8)
NEUTS SEG NFR BLD: 75 % (ref 42–75)
NITRITE UR QL STRIP.AUTO: NEGATIVE
NRBC BLD-RTO: 3 PER 100 WBC
P-R INTERVAL, ECG05: 148 MS
PH UR STRIP: 5 [PH] (ref 5–8)
PHOSPHATE SERPL-MCNC: 5.2 MG/DL (ref 2.5–4.9)
PLATELET # BLD AUTO: 296 K/UL (ref 135–420)
PLATELET COMMENTS,PCOM: ABNORMAL
PMV BLD AUTO: 9.5 FL (ref 9.2–11.8)
POTASSIUM SERPL-SCNC: 5.1 MMOL/L (ref 3.5–5.5)
POTASSIUM SERPL-SCNC: 5.2 MMOL/L (ref 3.5–5.5)
PROT SERPL-MCNC: 5.7 G/DL (ref 6.4–8.2)
PROT UR STRIP-MCNC: ABNORMAL MG/DL
PROT UR-MCNC: 42 MG/DL
PROT/CREAT UR-RTO: 0.3
PROTHROMBIN TIME: 16.1 SEC (ref 11.5–15.2)
Q-T INTERVAL, ECG07: 370 MS
QRS DURATION, ECG06: 82 MS
QTC CALCULATION (BEZET), ECG08: 407 MS
RBC # BLD AUTO: 2.01 M/UL (ref 4.2–5.3)
RBC #/AREA URNS HPF: NEGATIVE /HPF (ref 0–5)
RBC MORPH BLD: ABNORMAL
RBC MORPH BLD: ABNORMAL
SODIUM SERPL-SCNC: 137 MMOL/L (ref 136–145)
SODIUM SERPL-SCNC: 141 MMOL/L (ref 136–145)
SODIUM UR-SCNC: 40 MMOL/L (ref 20–110)
SP GR UR REFRACTOMETRY: 1.01 (ref 1–1.03)
TROPONIN I SERPL-MCNC: <0.02 NG/ML (ref 0–0.04)
UROBILINOGEN UR QL STRIP.AUTO: 0.2 EU/DL (ref 0.2–1)
VENTRICULAR RATE, ECG03: 73 BPM
WBC # BLD AUTO: 9.1 K/UL (ref 4.6–13.2)
WBC URNS QL MICRO: NEGATIVE /HPF (ref 0–4)

## 2018-11-18 PROCEDURE — 83735 ASSAY OF MAGNESIUM: CPT

## 2018-11-18 PROCEDURE — 85730 THROMBOPLASTIN TIME PARTIAL: CPT

## 2018-11-18 PROCEDURE — 81001 URINALYSIS AUTO W/SCOPE: CPT

## 2018-11-18 PROCEDURE — 85025 COMPLETE CBC W/AUTO DIFF WBC: CPT

## 2018-11-18 PROCEDURE — 82570 ASSAY OF URINE CREATININE: CPT

## 2018-11-18 PROCEDURE — 71045 X-RAY EXAM CHEST 1 VIEW: CPT

## 2018-11-18 PROCEDURE — 74011000258 HC RX REV CODE- 258: Performed by: EMERGENCY MEDICINE

## 2018-11-18 PROCEDURE — 96361 HYDRATE IV INFUSION ADD-ON: CPT

## 2018-11-18 PROCEDURE — 87040 BLOOD CULTURE FOR BACTERIA: CPT

## 2018-11-18 PROCEDURE — 93005 ELECTROCARDIOGRAM TRACING: CPT

## 2018-11-18 PROCEDURE — 30233N1 TRANSFUSION OF NONAUTOLOGOUS RED BLOOD CELLS INTO PERIPHERAL VEIN, PERCUTANEOUS APPROACH: ICD-10-PCS

## 2018-11-18 PROCEDURE — 86920 COMPATIBILITY TEST SPIN: CPT

## 2018-11-18 PROCEDURE — 84300 ASSAY OF URINE SODIUM: CPT

## 2018-11-18 PROCEDURE — 30233R1 TRANSFUSION OF NONAUTOLOGOUS PLATELETS INTO PERIPHERAL VEIN, PERCUTANEOUS APPROACH: ICD-10-PCS

## 2018-11-18 PROCEDURE — 83605 ASSAY OF LACTIC ACID: CPT

## 2018-11-18 PROCEDURE — 74011250636 HC RX REV CODE- 250/636: Performed by: EMERGENCY MEDICINE

## 2018-11-18 PROCEDURE — 84156 ASSAY OF PROTEIN URINE: CPT

## 2018-11-18 PROCEDURE — 96360 HYDRATION IV INFUSION INIT: CPT

## 2018-11-18 PROCEDURE — 82553 CREATINE MB FRACTION: CPT

## 2018-11-18 PROCEDURE — 84100 ASSAY OF PHOSPHORUS: CPT

## 2018-11-18 PROCEDURE — 99285 EMERGENCY DEPT VISIT HI MDM: CPT

## 2018-11-18 PROCEDURE — 96365 THER/PROPH/DIAG IV INF INIT: CPT

## 2018-11-18 PROCEDURE — 85018 HEMOGLOBIN: CPT

## 2018-11-18 PROCEDURE — P9035 PLATELET PHERES LEUKOREDUCED: HCPCS

## 2018-11-18 PROCEDURE — P9016 RBC LEUKOCYTES REDUCED: HCPCS

## 2018-11-18 PROCEDURE — 65610000006 HC RM INTENSIVE CARE

## 2018-11-18 PROCEDURE — 86901 BLOOD TYPING SEROLOGIC RH(D): CPT

## 2018-11-18 PROCEDURE — 83935 ASSAY OF URINE OSMOLALITY: CPT

## 2018-11-18 PROCEDURE — 96367 TX/PROPH/DG ADDL SEQ IV INF: CPT

## 2018-11-18 PROCEDURE — 80053 COMPREHEN METABOLIC PANEL: CPT

## 2018-11-18 PROCEDURE — 85610 PROTHROMBIN TIME: CPT

## 2018-11-18 PROCEDURE — 74011250636 HC RX REV CODE- 250/636: Performed by: NURSE PRACTITIONER

## 2018-11-18 PROCEDURE — 36430 TRANSFUSION BLD/BLD COMPNT: CPT

## 2018-11-18 PROCEDURE — 82330 ASSAY OF CALCIUM: CPT

## 2018-11-18 RX ORDER — DEXTROSE 50 % IN WATER (D50W) INTRAVENOUS SYRINGE
25-50 AS NEEDED
Status: DISCONTINUED | OUTPATIENT
Start: 2018-11-18 | End: 2018-11-20 | Stop reason: HOSPADM

## 2018-11-18 RX ORDER — VANCOMYCIN/0.9 % SOD CHLORIDE 1 G/100 ML
1000 PLASTIC BAG, INJECTION (ML) INTRAVENOUS ONCE
Status: DISCONTINUED | OUTPATIENT
Start: 2018-11-18 | End: 2018-11-18

## 2018-11-18 RX ORDER — MAGNESIUM SULFATE 100 %
4 CRYSTALS MISCELLANEOUS AS NEEDED
Status: CANCELLED | OUTPATIENT
Start: 2018-11-18

## 2018-11-18 RX ORDER — LEVOFLOXACIN 5 MG/ML
750 INJECTION, SOLUTION INTRAVENOUS
Status: DISCONTINUED | OUTPATIENT
Start: 2018-11-20 | End: 2018-11-18

## 2018-11-18 RX ORDER — SIMVASTATIN 40 MG/1
40 TABLET, FILM COATED ORAL
Status: CANCELLED | OUTPATIENT
Start: 2018-11-18

## 2018-11-18 RX ORDER — SODIUM CHLORIDE 9 MG/ML
100 INJECTION, SOLUTION INTRAVENOUS CONTINUOUS
Status: CANCELLED | OUTPATIENT
Start: 2018-11-18

## 2018-11-18 RX ORDER — ONDANSETRON 2 MG/ML
4 INJECTION INTRAMUSCULAR; INTRAVENOUS
Status: DISCONTINUED | OUTPATIENT
Start: 2018-11-18 | End: 2018-11-20 | Stop reason: HOSPADM

## 2018-11-18 RX ORDER — SODIUM CHLORIDE 0.9 % (FLUSH) 0.9 %
5-10 SYRINGE (ML) INJECTION EVERY 8 HOURS
Status: CANCELLED | OUTPATIENT
Start: 2018-11-18

## 2018-11-18 RX ORDER — SODIUM CHLORIDE 0.9 % (FLUSH) 0.9 %
5-10 SYRINGE (ML) INJECTION AS NEEDED
Status: CANCELLED | OUTPATIENT
Start: 2018-11-18

## 2018-11-18 RX ORDER — INSULIN LISPRO 100 [IU]/ML
INJECTION, SOLUTION INTRAVENOUS; SUBCUTANEOUS EVERY 6 HOURS
Status: CANCELLED | OUTPATIENT
Start: 2018-11-19

## 2018-11-18 RX ORDER — SODIUM CHLORIDE 9 MG/ML
100 INJECTION, SOLUTION INTRAVENOUS CONTINUOUS
Status: DISCONTINUED | OUTPATIENT
Start: 2018-11-18 | End: 2018-11-19

## 2018-11-18 RX ORDER — INSULIN LISPRO 100 [IU]/ML
INJECTION, SOLUTION INTRAVENOUS; SUBCUTANEOUS EVERY 6 HOURS
Status: DISCONTINUED | OUTPATIENT
Start: 2018-11-19 | End: 2018-11-19

## 2018-11-18 RX ORDER — ACETAMINOPHEN 325 MG/1
650 TABLET ORAL
Status: CANCELLED | OUTPATIENT
Start: 2018-11-18

## 2018-11-18 RX ORDER — SODIUM CHLORIDE 9 MG/ML
250 INJECTION, SOLUTION INTRAVENOUS AS NEEDED
Status: DISCONTINUED | OUTPATIENT
Start: 2018-11-18 | End: 2018-11-20 | Stop reason: HOSPADM

## 2018-11-18 RX ORDER — MAGNESIUM SULFATE 100 %
4 CRYSTALS MISCELLANEOUS AS NEEDED
Status: DISCONTINUED | OUTPATIENT
Start: 2018-11-18 | End: 2018-11-20 | Stop reason: HOSPADM

## 2018-11-18 RX ORDER — DEXTROSE 50 % IN WATER (D50W) INTRAVENOUS SYRINGE
25-50 AS NEEDED
Status: CANCELLED | OUTPATIENT
Start: 2018-11-18

## 2018-11-18 RX ORDER — SODIUM CHLORIDE 0.9 % (FLUSH) 0.9 %
5-10 SYRINGE (ML) INJECTION AS NEEDED
Status: DISCONTINUED | OUTPATIENT
Start: 2018-11-18 | End: 2018-11-20 | Stop reason: HOSPADM

## 2018-11-18 RX ORDER — VANCOMYCIN 2 GRAM/500 ML IN 0.9 % SODIUM CHLORIDE INTRAVENOUS
2000 ONCE
Status: DISCONTINUED | OUTPATIENT
Start: 2018-11-18 | End: 2018-11-18

## 2018-11-18 RX ORDER — LEVOFLOXACIN 5 MG/ML
750 INJECTION, SOLUTION INTRAVENOUS EVERY 24 HOURS
Status: DISCONTINUED | OUTPATIENT
Start: 2018-11-18 | End: 2018-11-18

## 2018-11-18 RX ADMIN — SODIUM CHLORIDE 1000 ML: 900 INJECTION, SOLUTION INTRAVENOUS at 20:45

## 2018-11-18 RX ADMIN — PIPERACILLIN SODIUM AND TAZOBACTAM SODIUM 2.25 G: 2; .25 INJECTION, POWDER, LYOPHILIZED, FOR SOLUTION INTRAVENOUS at 20:44

## 2018-11-18 RX ADMIN — SODIUM CHLORIDE 1000 ML: 900 INJECTION, SOLUTION INTRAVENOUS at 19:00

## 2018-11-18 RX ADMIN — SODIUM CHLORIDE 100 ML/HR: 900 INJECTION, SOLUTION INTRAVENOUS at 23:40

## 2018-11-18 RX ADMIN — LEVOFLOXACIN 750 MG: 750 INJECTION, SOLUTION INTRAVENOUS at 19:00

## 2018-11-18 RX ADMIN — SODIUM CHLORIDE 267 ML: 900 INJECTION, SOLUTION INTRAVENOUS at 20:46

## 2018-11-18 NOTE — ED PROVIDER NOTES
EMERGENCY DEPARTMENT HISTORY AND PHYSICAL EXAM 
 
6:55 PM 
 
 
Date: 11/18/2018 Patient Name: Yenifer Sow History of Presenting Illness No chief complaint on file. History Provided By: Patient Chief Complaint: Weakness Duration:  Today Timing:  Worsening Location: Generalized Quality: Weakness Severity: Moderate Modifying Factors: No modifying factors reported. Associated Symptoms: Patient reports associated symptoms of dizziness (\"Everything is spinning\"), lightheadedness, and nausea. Denies other associated symptoms, such as chest pain, shortness of breath, anal bleeding, cough, blood in stool, fever, hematuria, dysuria, and chills. Additional History (Context): Yenifer Sow is a 72 y.o. female with PMHx of HTN, DM, CAD, and Atrial Fibrillation who presents with worsening generalized weakness onset today. Patient reports generalized weakness for a few days, but states today, she sat in the chair, and was unable to get up. Patient reports associated symptoms of dizziness (\"Everything is spinning\"), lightheadedness, and nausea. Denies other associated symptoms, such as chest pain, shortness of breath, anal bleeding, cough, blood in stool, fever, hematuria, dysuria, and chills. Patient was also noted to be hypotensive, with a reading of 70/38. Patient also reports she recently had a mammographic biopsy, which was performed by Dr. Cher Jacob. States he gave her some nausea medication. Also notes to be on Eliquis for her hx of Atrial Fibrillation, but was taken off for her biopsy. Patient reports hx of High cholesterol, DM, and HTN. Deniex hx of Asthma. Denies tobacco use and illicit drug use. Reports EtOH use 1-2 drinks per year. No other concerns were expressed at this time. PCP: Aura Elkins MD 
 
Current Facility-Administered Medications Medication Dose Route Frequency Provider Last Rate Last Dose  sodium chloride (NS) flush 5-10 mL  5-10 mL IntraVENous PRN Agustiafortino-Rodrigues, Chirag Manifold, DO      
 sodium chloride 0.9 % bolus infusion 1,000 mL  1,000 mL IntraVENous ONCE Agustiady-RodriguesZaine Manifold, DO Followed by  
 sodium chloride 0.9 % bolus infusion 1,000 mL  1,000 mL IntraVENous ONCE Agmichaeliafortino-Chirag Rodrigues Manifold, DO Followed by  
Michail Schwab sodium chloride 0.9 % bolus infusion 267 mL  267 mL IntraVENous ONCE Agmichaeliafortino-Lilia Chirag Manifold, DO      
 piperacillin-tazobactam (ZOSYN) 4.5 g in 0.9% sodium chloride (MBP/ADV) 100 mL MBP  4.5 g IntraVENous Q6H Agustiady-Rodrigues, Chirag Manifold, DO      
 levoFLOXacin (LEVAQUIN) 750 mg in D5W IVPB  750 mg IntraVENous Q24H Josefina Fremont,  mL/hr at 11/18/18 1900 750 mg at 11/18/18 1900  
 vancomycin (VANCOCIN) 2000 mg in  ml infusion  2,000 mg IntraVENous ONCE Agmichaeliafortino-Rodrigues, Chirag Manifold, DO      
 
Current Outpatient Medications Medication Sig Dispense Refill  ondansetron (ZOFRAN ODT) 4 mg disintegrating tablet Take 1 Tab by mouth every eight (8) hours as needed for Nausea. 15 Tab 0  
 oxyCODONE-acetaminophen (PERCOCET) 5-325 mg per tablet Take 1 Tab by mouth every six (6) hours as needed for Pain. Max Daily Amount: 4 Tabs. 30 Tab 0  
 enoxaparin (LOVENOX) 100 mg/mL by SubCUTAneous route every twelve (12) hours.  metFORMIN (GLUCOPHAGE) 500 mg tablet Take  by mouth two (2) times daily (with meals).  lisinopril-hydroCHLOROthiazide (PRINZIDE, ZESTORETIC) 20-12.5 mg per tablet Take  by mouth daily.  simvastatin (ZOCOR) 40 mg tablet Take  by mouth nightly.  metoprolol tartrate (LOPRESSOR) 50 mg tablet Take  by mouth daily.  Liraglutide (VICTOZA 3-LUIS) 0.6 mg/0.1 mL (18 mg/3 mL) pnij 0.6 mg by SubCUTAneous route.  apixaban (ELIQUIS) 5 mg tablet Take 5 mg by mouth two (2) times a day. Past History Past Medical History: 
Past Medical History:  
Diagnosis Date  Arrhythmia Atrial Fibrillation  CAD (coronary artery disease)  Diabetes ([address]  Hypertension Past Surgical History: 
Past Surgical History:  
Procedure Laterality Date  BREAST SURGERY PROCEDURE UNLISTED    
 left breast bx- benign  CARDIAC SURG PROCEDURE UNLIST  2009 or 2010 Coronary Stent [address] Family History: 
Family History Problem Relation Age of Onset  Cancer Mother 72  
     lung Social History: 
Social History Tobacco Use  Smoking status: Former Smoker Last attempt to quit: 1992 Years since quittin.0  Smokeless tobacco: Never Used Substance Use Topics  Alcohol use: Yes Comment: oc  Drug use: No  
 
 
Allergies: 
No Known Allergies Review of Systems Review of Systems Constitutional: Negative for chills and fever. Respiratory: Negative for cough and shortness of breath. Cardiovascular: Negative for chest pain. Gastrointestinal: Positive for nausea. Negative for anal bleeding and blood in stool. Genitourinary: Negative for dysuria and hematuria. Neurological: Positive for dizziness, weakness (generalized ) and light-headedness. All other systems reviewed and are negative. Physical Exam  
 
Visit Vitals BP (!) 70/38 (BP 1 Location: Left arm, BP Patient Position: At rest) Pulse 74 Temp 97.3 °F (36.3 °C) Resp 18 Ht 5' 6\" (1.676 m) Wt 108.9 kg (240 lb) SpO2 100% BMI 38.74 kg/m² Physical Exam 
GENERAL: Elderly  female in mild distress, AOx4, awake EYES: PERRLA, EOMI, conjunctival pallor, no hyphema HEAD: NC/AT, no lacerations NECK: No midline tenderness, no step offs,  trachea midline ENT: MMM, no pharyngeal edema, no hemotympanum, hearing intact CV: RRR, +S1/S2, no JVD RESP: Lungs CTA B/L,  no w/r/r, breaths sounds equal and nonlabored, no accessory muscle use CHEST: No crepitus or obvious deformity, Bruising to right-side of breast and chest wall. ABD:  normoactive BS x4 quadrants, non-TTP, soft and non distended. Ecchymosis over bilateral flank, consistent with Lovenox shots. EXTREMITIES: no LE edema B/L, no obvious deformity INTEGUMENTARY: warm, dry, pale-appearing MSK: normal ROM, normal gross strength NEURO: Alert and appropriate, normal speech,  CNs II-XII intact, moving all 4 extremities freely and symmetrically Diagnostic Study Results Labs - Recent Results (from the past 12 hour(s)) CBC WITH AUTOMATED DIFF Collection Time: 11/18/18  6:50 PM  
Result Value Ref Range WBC 9.1 4.6 - 13.2 K/uL  
 RBC 2.01 (L) 4.20 - 5.30 M/uL HGB 6.1 (L) 12.0 - 16.0 g/dL HCT 18.5 (L) 35.0 - 45.0 % MCV 92.0 74.0 - 97.0 FL  
 MCH 30.3 24.0 - 34.0 PG  
 MCHC 33.0 31.0 - 37.0 g/dL  
 RDW 15.5 (H) 11.6 - 14.5 % PLATELET 695 121 - 173 K/uL MPV 9.5 9.2 - 11.8 FL  
 NEUTROPHILS PENDING % LYMPHOCYTES PENDING % MONOCYTES PENDING % EOSINOPHILS PENDING % BASOPHILS PENDING %  
 ABS. NEUTROPHILS PENDING K/UL  
 ABS. LYMPHOCYTES PENDING K/UL  
 ABS. MONOCYTES PENDING K/UL  
 ABS. EOSINOPHILS PENDING K/UL  
 ABS. BASOPHILS PENDING K/UL  
 DF PENDING   
POC LACTIC ACID Collection Time: 11/18/18  6:56 PM  
Result Value Ref Range Lactic Acid (POC) 2.22 (HH) 0.40 - 2.00 mmol/L Radiologic Studies -  
XR CHEST PORT    (Results Pending) Medical Decision Making I am the first provider for this patient. I reviewed the vital signs, available nursing notes, past medical history, past surgical history, family history and social history. Vital Signs-Reviewed the patient's vital signs. Pulse Oximetry Analysis -  100% on room air (Interpretation) Cardiac Monitor: 
Rate: 74bpm 
 
EKG: Interpreted by the EP: 
Time: 7:09PM 
Rate: 73bpm 
Rhythm: Normal Sinus Rhythm Interpretation: QTc 407ms; No STEMI Records Reviewed: Nursing Notes, Old Medical Records and Triage notes  (Time of Review: 6:55 PM) ED Course: Progress Notes, Reevaluation, and Consults: 
7:00 PM : Pt care transferred to Dr. Allison Xiao  ,ED provider. History of patient complaint(s), available diagnostic reports and current treatment plan has been discussed thoroughly. Bedside rounding on patient occured : yes . Intended disposition of patient : TBD Pending diagnostics reports and/or labs (please list): labs, Chest X-ray, UA Provider Notes (Medical Decision Making): 44-year-old female, history of Atrial Fibrillation on Eliquis with recent mammographic biopsy who presents with generalized weakness. On exam, the patient was noted to be hypotensive, but not tachycardia and afebrile. She is pale-appearing with pale conjunctiva. As the patient is anticoagulated, there is concern for anemia, and thus we will obtain a Type and screen and clotters. We will also initiate sepsis bundle, including 30cc/kg of IV fluids. As the patient has no history of CHF, we will also evaluate for ACS and obtain an EKG and Cardiac enzymes. Diagnosis Clinical Impression: 1. Hypotension, unspecified hypotension type 2. Atrial fibrillation, unspecified type ([address] Disposition: Signed out to Dr. Allison Xiao, DO Follow-up Information None Medication List  
  
ASK your doctor about these medications ELIQUIS 5 mg tablet Generic drug:  apixaban 
  
lisinopril-hydroCHLOROthiazide 20-12.5 mg per tablet Commonly known as:  PRINZIDE, ZESTORETIC 
  
LOVENOX 100 mg/mL Generic drug:  enoxaparin 
  
metFORMIN 500 mg tablet Commonly known as:  GLUCOPHAGE 
  
metoprolol tartrate 50 mg tablet Commonly known as:  LOPRESSOR 
  
ondansetron 4 mg disintegrating tablet Commonly known as:  ZOFRAN ODT Take 1 Tab by mouth every eight (8) hours as needed for Nausea. oxyCODONE-acetaminophen 5-325 mg per tablet Commonly known as:  PERCOCET Take 1 Tab by mouth every six (6) hours as needed for Pain. Max Daily Amount: 4 Tabs. simvastatin 40 mg tablet Commonly known as:  ZOCOR 
  
VICTOZA 3-LUIS 0.6 mg/0.1 mL (18 mg/3 mL) Pnij Generic drug:  Liraglutide 
  
  
 
_______________________________ Scribe Attestation Francoise Kingston acting as a scribe for and in the presence of Jeffery Isbell November 18, 2018 at 6:55 PM 
    
Provider Attestation:     
I personally performed the services described in the documentation, reviewed the documentation, as recorded by the scribe in my presence, and it accurately and completely records my words and actions. November 18, 2018 at 6:55 PM - Jeffery Isbell   
 
 
_______________________________

## 2018-11-19 LAB
ALBUMIN SERPL-MCNC: 2.2 G/DL (ref 3.4–5)
ALBUMIN/GLOB SERPL: 0.9 {RATIO} (ref 0.8–1.7)
ALP SERPL-CCNC: 59 U/L (ref 45–117)
ALT SERPL-CCNC: 11 U/L (ref 13–56)
ANION GAP SERPL CALC-SCNC: 14 MMOL/L (ref 3–18)
ANION GAP SERPL CALC-SCNC: 9 MMOL/L (ref 3–18)
ARTERIAL PATENCY WRIST A: YES
AST SERPL-CCNC: 9 U/L (ref 15–37)
BASE DEFICIT BLD-SCNC: 11 MMOL/L
BASOPHILS # BLD: 0 K/UL (ref 0–0.1)
BASOPHILS NFR BLD: 0 % (ref 0–2)
BDY SITE: ABNORMAL
BILIRUB SERPL-MCNC: 1.2 MG/DL (ref 0.2–1)
BLD PROD TYP BPU: NORMAL
BLD PROD TYP BPU: NORMAL
BODY TEMPERATURE: 98.4
BPU ID: NORMAL
BPU ID: NORMAL
BUN SERPL-MCNC: 65 MG/DL (ref 7–18)
BUN SERPL-MCNC: 71 MG/DL (ref 7–18)
BUN/CREAT SERPL: 23 (ref 12–20)
BUN/CREAT SERPL: 23 (ref 12–20)
CA-I SERPL-SCNC: 1.09 MMOL/L (ref 1.12–1.32)
CALCIUM SERPL-MCNC: 7.4 MG/DL (ref 8.5–10.1)
CALCIUM SERPL-MCNC: 8 MG/DL (ref 8.5–10.1)
CALLED TO:,BCALL1: NORMAL
CHLORIDE SERPL-SCNC: 115 MMOL/L (ref 100–108)
CHLORIDE SERPL-SCNC: 119 MMOL/L (ref 100–108)
CO2 SERPL-SCNC: 16 MMOL/L (ref 21–32)
CO2 SERPL-SCNC: 18 MMOL/L (ref 21–32)
CREAT SERPL-MCNC: 2.78 MG/DL (ref 0.6–1.3)
CREAT SERPL-MCNC: 3.06 MG/DL (ref 0.6–1.3)
DIFFERENTIAL METHOD BLD: ABNORMAL
EOSINOPHIL # BLD: 0 K/UL (ref 0–0.4)
EOSINOPHIL NFR BLD: 0 % (ref 0–5)
ERYTHROCYTE [DISTWIDTH] IN BLOOD BY AUTOMATED COUNT: 15.7 % (ref 11.6–14.5)
ERYTHROCYTE [DISTWIDTH] IN BLOOD BY AUTOMATED COUNT: 16.3 % (ref 11.6–14.5)
ERYTHROCYTE [DISTWIDTH] IN BLOOD BY AUTOMATED COUNT: 16.3 % (ref 11.6–14.5)
GAS FLOW.O2 O2 DELIVERY SYS: ABNORMAL L/MIN
GLOBULIN SER CALC-MCNC: 2.4 G/DL (ref 2–4)
GLUCOSE BLD STRIP.AUTO-MCNC: 111 MG/DL (ref 70–110)
GLUCOSE BLD STRIP.AUTO-MCNC: 142 MG/DL (ref 70–110)
GLUCOSE BLD STRIP.AUTO-MCNC: 147 MG/DL (ref 70–110)
GLUCOSE BLD STRIP.AUTO-MCNC: 174 MG/DL (ref 70–110)
GLUCOSE SERPL-MCNC: 115 MG/DL (ref 74–99)
GLUCOSE SERPL-MCNC: 121 MG/DL (ref 74–99)
HCO3 BLD-SCNC: 14.8 MMOL/L (ref 22–26)
HCT VFR BLD AUTO: 24.5 % (ref 35–45)
HCT VFR BLD AUTO: 25.8 % (ref 35–45)
HCT VFR BLD AUTO: 27.6 % (ref 35–45)
HEMOCCULT STL QL: POSITIVE
HGB BLD-MCNC: 8.3 G/DL (ref 12–16)
HGB BLD-MCNC: 8.7 G/DL (ref 12–16)
HGB BLD-MCNC: 9.2 G/DL (ref 12–16)
INR PPP: 1.2 (ref 0.8–1.2)
LACTATE SERPL-SCNC: 0.7 MMOL/L (ref 0.4–2)
LACTATE SERPL-SCNC: 1.4 MMOL/L (ref 0.4–2)
LYMPHOCYTES # BLD: 0.8 K/UL (ref 0.9–3.6)
LYMPHOCYTES NFR BLD: 15 % (ref 21–52)
MAGNESIUM SERPL-MCNC: 2.1 MG/DL (ref 1.6–2.6)
MCH RBC QN AUTO: 29.6 PG (ref 24–34)
MCH RBC QN AUTO: 29.7 PG (ref 24–34)
MCH RBC QN AUTO: 30.1 PG (ref 24–34)
MCHC RBC AUTO-ENTMCNC: 33.3 G/DL (ref 31–37)
MCHC RBC AUTO-ENTMCNC: 33.7 G/DL (ref 31–37)
MCHC RBC AUTO-ENTMCNC: 33.9 G/DL (ref 31–37)
MCV RBC AUTO: 87.5 FL (ref 74–97)
MCV RBC AUTO: 89 FL (ref 74–97)
MCV RBC AUTO: 89.3 FL (ref 74–97)
MONOCYTES # BLD: 0.5 K/UL (ref 0.05–1.2)
MONOCYTES NFR BLD: 9 % (ref 3–10)
NEUTS SEG # BLD: 4.2 K/UL (ref 1.8–8)
NEUTS SEG NFR BLD: 76 % (ref 40–73)
O2/TOTAL GAS SETTING VFR VENT: 21 %
OSMOLALITY UR: 345 MOSM/KG H2O (ref 300–900)
PCO2 BLD: 27.2 MMHG (ref 35–45)
PH BLD: 7.34 [PH] (ref 7.35–7.45)
PHOSPHATE SERPL-MCNC: 4.2 MG/DL (ref 2.5–4.9)
PLATELET # BLD AUTO: 254 K/UL (ref 135–420)
PLATELET # BLD AUTO: 257 K/UL (ref 135–420)
PLATELET # BLD AUTO: 264 K/UL (ref 135–420)
PMV BLD AUTO: 9.5 FL (ref 9.2–11.8)
PMV BLD AUTO: 9.5 FL (ref 9.2–11.8)
PMV BLD AUTO: 9.7 FL (ref 9.2–11.8)
PO2 BLD: 90 MMHG (ref 80–100)
POTASSIUM SERPL-SCNC: 4.3 MMOL/L (ref 3.5–5.5)
POTASSIUM SERPL-SCNC: 4.6 MMOL/L (ref 3.5–5.5)
PROT SERPL-MCNC: 4.6 G/DL (ref 6.4–8.2)
PROTHROMBIN TIME: 15.2 SEC (ref 11.5–15.2)
RBC # BLD AUTO: 2.8 M/UL (ref 4.2–5.3)
RBC # BLD AUTO: 2.89 M/UL (ref 4.2–5.3)
RBC # BLD AUTO: 3.1 M/UL (ref 4.2–5.3)
SAO2 % BLD: 97 % (ref 92–97)
SERVICE CMNT-IMP: ABNORMAL
SODIUM SERPL-SCNC: 145 MMOL/L (ref 136–145)
SODIUM SERPL-SCNC: 146 MMOL/L (ref 136–145)
SPECIMEN TYPE: ABNORMAL
STATUS OF UNIT,%ST: NORMAL
STATUS OF UNIT,%ST: NORMAL
TOTAL RESP. RATE, ITRR: 14
UNIT DIVISION, %UDIV: 0
UNIT DIVISION, %UDIV: 0
WBC # BLD AUTO: 5.5 K/UL (ref 4.6–13.2)
WBC # BLD AUTO: 5.8 K/UL (ref 4.6–13.2)
WBC # BLD AUTO: 6.4 K/UL (ref 4.6–13.2)

## 2018-11-19 PROCEDURE — 74011000258 HC RX REV CODE- 258: Performed by: NURSE PRACTITIONER

## 2018-11-19 PROCEDURE — 85025 COMPLETE CBC W/AUTO DIFF WBC: CPT

## 2018-11-19 PROCEDURE — 82330 ASSAY OF CALCIUM: CPT

## 2018-11-19 PROCEDURE — 65610000006 HC RM INTENSIVE CARE

## 2018-11-19 PROCEDURE — 36430 TRANSFUSION BLD/BLD COMPNT: CPT

## 2018-11-19 PROCEDURE — 85610 PROTHROMBIN TIME: CPT

## 2018-11-19 PROCEDURE — 83605 ASSAY OF LACTIC ACID: CPT

## 2018-11-19 PROCEDURE — 74011000258 HC RX REV CODE- 258: Performed by: INTERNAL MEDICINE

## 2018-11-19 PROCEDURE — 74011000250 HC RX REV CODE- 250: Performed by: NURSE PRACTITIONER

## 2018-11-19 PROCEDURE — 80053 COMPREHEN METABOLIC PANEL: CPT

## 2018-11-19 PROCEDURE — 83735 ASSAY OF MAGNESIUM: CPT

## 2018-11-19 PROCEDURE — 82803 BLOOD GASES ANY COMBINATION: CPT

## 2018-11-19 PROCEDURE — 36600 WITHDRAWAL OF ARTERIAL BLOOD: CPT

## 2018-11-19 PROCEDURE — 74011250636 HC RX REV CODE- 250/636: Performed by: NURSE PRACTITIONER

## 2018-11-19 PROCEDURE — 36415 COLL VENOUS BLD VENIPUNCTURE: CPT

## 2018-11-19 PROCEDURE — P9016 RBC LEUKOCYTES REDUCED: HCPCS

## 2018-11-19 PROCEDURE — 85027 COMPLETE CBC AUTOMATED: CPT

## 2018-11-19 PROCEDURE — 74011636637 HC RX REV CODE- 636/637: Performed by: INTERNAL MEDICINE

## 2018-11-19 PROCEDURE — 77030037878 HC DRSG MEPILEX >48IN BORD MOLN -B

## 2018-11-19 PROCEDURE — 82962 GLUCOSE BLOOD TEST: CPT

## 2018-11-19 PROCEDURE — 84100 ASSAY OF PHOSPHORUS: CPT

## 2018-11-19 PROCEDURE — 82272 OCCULT BLD FECES 1-3 TESTS: CPT

## 2018-11-19 RX ORDER — INSULIN LISPRO 100 [IU]/ML
INJECTION, SOLUTION INTRAVENOUS; SUBCUTANEOUS
Status: DISCONTINUED | OUTPATIENT
Start: 2018-11-19 | End: 2018-11-20 | Stop reason: HOSPADM

## 2018-11-19 RX ADMIN — FAMOTIDINE 20 MG: 10 INJECTION, SOLUTION INTRAVENOUS at 08:47

## 2018-11-19 RX ADMIN — INSULIN LISPRO 2 UNITS: 100 INJECTION, SOLUTION INTRAVENOUS; SUBCUTANEOUS at 21:45

## 2018-11-19 RX ADMIN — SODIUM CHLORIDE, SODIUM ACETATE ANHYDROUS, SODIUM GLUCONATE, POTASSIUM CHLORIDE, AND MAGNESIUM CHLORIDE: 526; 222; 502; 37; 30 INJECTION, SOLUTION INTRAVENOUS at 10:49

## 2018-11-19 RX ADMIN — SODIUM CHLORIDE, SODIUM ACETATE ANHYDROUS, SODIUM GLUCONATE, POTASSIUM CHLORIDE, AND MAGNESIUM CHLORIDE: 526; 222; 502; 37; 30 INJECTION, SOLUTION INTRAVENOUS at 21:50

## 2018-11-19 RX ADMIN — CALCIUM GLUCONATE 2 G: 94 INJECTION, SOLUTION INTRAVENOUS at 01:24

## 2018-11-19 NOTE — CONSULTS
[facility]  Pulmonary, Critical Care, and Sleep Medicine    Name: Jessica Rodriguez MRN: [id number]   : 1945 Hospital: [address] [place] [address] Dr   Date: 2018        Pulmonary Critical Care Initial Patient Consult                                              Reason for CC Consult: Hemorrhagic Anemia, Hypotension    Subjective/History:     Jessica Rodriguez has been seen and evaluated at the request of Dr. Yanira Chaidez for hemorrhagic anemia, hypotension. Patient is a 72 y.o. female who presented to the ED with a PMHx of HTN, DM, CAD with PCI, and Atrial Fibrillation on Eliquis at home, who presented to the ED with worsening generalized weakness over the last few days and associated symptoms of dizziness, lightheadedness, and nausea with vomiting. Patient states that she had a mamographic biopsy last week Tuesday and restarted her Eliquis on Wednesday night. Patient has large area of ecchymosis that extends from mid chest down right breast to right thorax and around to back. Family states that patient has said she did not feel well and has had periods of nausea since biopsy on Tuesday. Has not been eating and drinking much since then. Came into ED today due to worsening symptoms and dizziness and weakness. Patient denies chest pain, shortness of breath, cough, blood in stool, blood in emesis, fever, chills. Patient reports hx of CKD3, DM, CAD s/p PCI , and HTN. Denies hx of Asthma, liver disease, sleep apnea. Denies use of inhalers, Denies tobacco use and illicit drug use. Reports ETOH use 1-2 drinks per year. In ED patient found to be hypotensive with systolic in the 26'Q and in acute renal failure. Intially suspected sepsis and protocol was initiated. Blood cultures drawn and patient given Levaquin and Zosyn x 1 dose each. Received 4L NS and is currently receiving 2nd unit of PRBC's. Patient to also receive 2 units of platelets. Lamb was placed for strict I&Os.  Surgery, Dr. Yanira Chaidez was called and is currently in house to see patient. Renal also consulted. PCCM consulted for ICU placement. [x]The patient is critically ill on      []Mechanical ventilation []Pressors   []BiPAP [x] Acute anemia with associated hypotension             Review of Systems:  Constitutional: positive for fatigue  Eyes: negative  Ears, nose, mouth, throat, and face: negative  Respiratory: negative  Cardiovascular: positive for near-syncope, fatigue, dizziness  Gastrointestinal: negative  Genitourinary:positive for decreased urine  Integument/breast: positive for large area of bruising  Hematologic/lymphatic: positive for easy bruising  Musculoskeletal:negative  Neurological: negative  Behavioral/Psych: negative  Endocrine: negative  Allergic/Immunologic: negative    Past Medical History:  Past Medical History:   Diagnosis Date    Arrhythmia     Atrial Fibrillation    CAD (coronary artery disease)     Diabetes ([facility] [address]     Hypertension         Past Surgical History:  Past Surgical History:   Procedure Laterality Date    BREAST SURGERY PROCEDURE UNLISTED      left breast bx- benign    CARDIAC SURG PROCEDURE UNLIST  2009 or 2010    Coronary Stent    HX GASTRIC BYPASS  1995    HX HYSTERECTOMY  1980    HX LAP CHOLECYSTECTOMY  1978        Medications:  Prior to Admission medications    Medication Sig Start Date End Date Taking? Authorizing Provider   ondansetron (ZOFRAN ODT) 4 mg disintegrating tablet Take 1 Tab by mouth every eight (8) hours as needed for Nausea. 11/15/18   Lex Gao MD   oxyCODONE-acetaminophen (PERCOCET) 5-325 mg per tablet Take 1 Tab by mouth every six (6) hours as needed for Pain. Max Daily Amount: 4 Tabs. 11/13/18   Lex Gao MD   enoxaparin (LOVENOX) 100 mg/mL by SubCUTAneous route every twelve (12) hours. Provider, Historical   metFORMIN (GLUCOPHAGE) 500 mg tablet Take  by mouth two (2) times daily (with meals).     Provider, Historical   lisinopril-hydroCHLOROthiazide (Shelda Barney, ZESTORETIC) 20-12.5 mg per tablet Take  by mouth daily. Provider, Historical   simvastatin (ZOCOR) 40 mg tablet Take  by mouth nightly. Provider, Historical   metoprolol tartrate (LOPRESSOR) 50 mg tablet Take  by mouth daily. Provider, Historical   Liraglutide (VICTOZA 3-LUIS) 0.6 mg/0.1 mL (18 mg/3 mL) pnij 0.6 mg by SubCUTAneous route. Provider, Historical   apixaban (ELIQUIS) 5 mg tablet Take 5 mg by mouth two (2) times a day.     Provider, Historical       Current Facility-Administered Medications   Medication Dose Route Frequency    0.9% sodium chloride infusion  100 mL/hr IntraVENous CONTINUOUS       Allergy:  No Known Allergies     Social History:  Social History     Tobacco Use    Smoking status: Former Smoker     Last attempt to quit: 1992     Years since quittin.0    Smokeless tobacco: Never Used   Substance Use Topics    Alcohol use: Yes     Comment: oc    Drug use: No        Family History:  Family History   Problem Relation Age of Onset    Cancer Mother 72        lung          Objective:   Vital Signs:    Visit Vitals  BP 98/54   Pulse 82   Temp 98 °F (36.7 °C)   Resp 16   Ht 5' 6\" (1.676 m)   Wt 108.9 kg (240 lb)   SpO2 99%   BMI 38.74 kg/m²       O2 Device: Room air       Temp (24hrs), Av.7 °F (36.5 °C), Min:97.3 °F (36.3 °C), Max:98 °F (36.7 °C)       Patient Vitals for the past 8 hrs:   Temp Pulse Resp BP SpO2   18 2215  82 16 98/54 99 %   18 2200  91 15 (!) 82/47 98 %   18 2153  86 15  98 %   18 2152  86 15  98 %   18 215  86 15  99 %   18 2150  85 15  98 %   18  86 15  98 %   18 98 °F (36.7 °C) 86 14 97/52 98 %   187  85 17  99 %   18 2146  89 15  98 %   18  88 15 97/52 99 %   18  87 14  99 %   11/18/18 2143  87 14  99 %   18 2142  86 13  99 %   18 2141  86 14  99 %   18 2140  87 13  99 %   189  87 14  99 % 11/18/18 2138  88 15  100 %   11/18/18 2137  89 16  100 %   11/18/18 2136  89 16  100 %   11/18/18 2135  88 14  100 %   11/18/18 2134  88 14  100 %   11/18/18 2133  88 14  100 %   11/18/18 2132  89 17  100 %   11/18/18 2131  88 15  100 %   11/18/18 2130  87 15 103/52 100 %   11/18/18 2129  90 14  100 %   11/18/18 2128  89 15  100 %   11/18/18 2127  90 15  100 %   11/18/18 2126  90 16  98 %   11/18/18 2125  91 15  100 %   11/18/18 2124  92 15  100 %   11/18/18 2123  92 14  100 %   11/18/18 2122  92 13  100 %   11/18/18 2121  92 14  99 %   11/18/18 2120  88 10  99 %   11/18/18 2119  86 15     11/18/18 2118  84 14     11/18/18 2117  83 14  99 %   11/18/18 2116 97.7 °F (36.5 °C) 83 16 93/46 99 %   11/18/18 2115 97.7 °F (36.5 °C) 85 15 93/46 99 %   11/18/18 2100  84 15 93/62 98 %   11/18/18 2045  82 16 (!) 85/49 99 %   11/18/18 2030  84 16 96/54    11/18/18 2015  81 15     11/18/18 2000  77 14 (!) 74/33    11/18/18 1945  78 15 (!) 75/35    11/18/18 1930  80 15 (!) 89/44    11/18/18 1915  77 14 (!) 74/58    11/18/18 1900  75 14 (!) 77/49    11/18/18 1845     100 %   11/18/18 1845  79 15 (!) 67/25    11/18/18 1837 97.3 °F (36.3 °C) 74 18 (!) 70/38 100 %     Intake/Output:   Last shift:      11/18 1901 - 11/19 0700  In: 4800.3 [I.V.:4417]  Out: -   Last 3 shifts: No intake/output data recorded.     Intake/Output Summary (Last 24 hours) at 11/18/2018 2229  Last data filed at 11/18/2018 2202  Gross per 24 hour   Intake 4800.33 ml   Output    Net 4800.33 ml     Physical Exam:  Physical Examination: General appearance - oriented to person, place, and time and acyanotic, in no respiratory distress  Mental status - alert, oriented to person, place, and time, affect appropriate to mood  Eyes - pupils equal and reactive, extraocular eye movements intact, sclera anicteric  Mouth - mucous membranes moist, pharynx normal without lesions  Neck - supple, no significant adenopathy, bilateral symmetric anterior adenopathy  Chest - clear to auscultation, no wheezes, rales or rhonchi, symmetric air entry, no tachypnea, retractions or cyanosis  Heart - normal rate, regular rhythm, normal S1, S2, no murmurs, rubs, clicks or gallops  Abdomen - soft, nontender, nondistended, no masses or organomegaly  Breasts - Right breast ecchymotic with large hematoma that extends to left chest and down and around to back. Neurological - alert, oriented, normal speech, no focal findings or movement disorder noted  Musculoskeletal - no joint tenderness, deformity or swelling  Extremities - peripheral pulses normal, no pedal edema, no clubbing or cyanosis  Skin - large area of ecchymosis from right breast down and around to back, decreased turgor, no rashes, no suspicious skin lesions noted     Data:     Recent Results (from the past 24 hour(s))   METABOLIC PANEL, COMPREHENSIVE    Collection Time: 11/18/18  6:50 PM   Result Value Ref Range    Sodium 137 136 - 145 mmol/L    Potassium 5.1 3.5 - 5.5 mmol/L    Chloride 107 100 - 108 mmol/L    CO2 17 (L) 21 - 32 mmol/L    Anion gap 13 3.0 - 18 mmol/L    Glucose 217 (H) 74 - 99 mg/dL    BUN 97 (H) 7.0 - 18 MG/DL    Creatinine 5.95 (H) 0.6 - 1.3 MG/DL    BUN/Creatinine ratio 16 12 - 20      GFR est AA 8 (L) >60 ml/min/1.73m2    GFR est non-AA 7 (L) >60 ml/min/1.73m2    Calcium 7.5 (L) 8.5 - 10.1 MG/DL    Bilirubin, total 0.8 0.2 - 1.0 MG/DL    ALT (SGPT) 14 13 - 56 U/L    AST (SGOT) 11 (L) 15 - 37 U/L    Alk.  phosphatase 67 45 - 117 U/L    Protein, total 5.7 (L) 6.4 - 8.2 g/dL    Albumin 2.5 (L) 3.4 - 5.0 g/dL    Globulin 3.2 2.0 - 4.0 g/dL    A-G Ratio 0.8 0.8 - 1.7     CBC WITH AUTOMATED DIFF    Collection Time: 11/18/18  6:50 PM   Result Value Ref Range    WBC 9.1 4.6 - 13.2 K/uL    RBC 2.01 (L) 4.20 - 5.30 M/uL    HGB 6.1 (L) 12.0 - 16.0 g/dL    HCT 18.5 (L) 35.0 - 45.0 %    MCV 92.0 74.0 - 97.0 FL    MCH 30.3 24.0 - 34.0 PG    MCHC 33.0 31.0 - 37.0 g/dL    RDW 15.5 (H) 11.6 - 14.5 %    PLATELET 422 993 - 384 K/uL    MPV 9.5 9.2 - 11.8 FL    NEUTROPHILS 75 42 - 75 %    BAND NEUTROPHILS 4 0 - 5 %    LYMPHOCYTES 17 (L) 20 - 51 %    MONOCYTES 4 2 - 9 %    EOSINOPHILS 0 0 - 5 %    BASOPHILS 0 0 - 3 %    NRBC 3.0 (H) 0  WBC    ABS. NEUTROPHILS 7.2 1.8 - 8.0 K/UL    ABS. LYMPHOCYTES 1.5 0.8 - 3.5 K/UL    ABS. MONOCYTES 0.4 0 - 1.0 K/UL    ABS. EOSINOPHILS 0.0 0.0 - 0.4 K/UL    ABS.  BASOPHILS 0.0 0.0 - 0.06 K/UL    DF MANUAL      PLATELET COMMENTS ADEQUATE PLATELETS      RBC COMMENTS ANISOCYTOSIS  2+        RBC COMMENTS POLYCHROMASIA  1+       CARDIAC PANEL,(CK, CKMB & TROPONIN)    Collection Time: 11/18/18  6:50 PM   Result Value Ref Range     26 - 192 U/L    CK - MB <1.0 <3.6 ng/ml    CK-MB Index  0.0 - 4.0 %     CALCULATION NOT PERFORMED WHEN RESULT IS BELOW LINEAR LIMIT    Troponin-I, Qt. <0.02 0.0 - 0.045 NG/ML   PROTHROMBIN TIME + INR    Collection Time: 11/18/18  6:50 PM   Result Value Ref Range    Prothrombin time 16.1 (H) 11.5 - 15.2 sec    INR 1.3 (H) 0.8 - 1.2     PTT    Collection Time: 11/18/18  6:50 PM   Result Value Ref Range    aPTT 40.6 (H) 23.0 - 36.4 SEC   MAGNESIUM    Collection Time: 11/18/18  6:50 PM   Result Value Ref Range    Magnesium 2.4 1.6 - 2.6 mg/dL   POC LACTIC ACID    Collection Time: 11/18/18  6:56 PM   Result Value Ref Range    Lactic Acid (POC) 2.22 (HH) 0.40 - 2.00 mmol/L   EKG, 12 LEAD, INITIAL    Collection Time: 11/18/18  7:09 PM   Result Value Ref Range    Ventricular Rate 73 BPM    Atrial Rate 73 BPM    P-R Interval 148 ms    QRS Duration 82 ms    Q-T Interval 370 ms    QTC Calculation (Bezet) 407 ms    Calculated P Axis 15 degrees    Calculated R Axis -3 degrees    Calculated T Axis 9 degrees    Diagnosis       Normal sinus rhythm  Inferior infarct , age undetermined  Possible Anterior infarct , age undetermined  Abnormal ECG    Confirmed by Denton Yepez MD, Yvon Gauthier ([id number]) on 11/18/2018 8:29:30 PM     URINALYSIS W/ RFLX MICROSCOPIC    Collection Time: 11/18/18  8:30 PM   Result Value Ref Range    Color YELLOW      Appearance CLOUDY      Specific gravity 1.014 1.005 - 1.030      pH (UA) 5.0 5.0 - 8.0      Protein TRACE (A) NEG mg/dL    Glucose NEGATIVE  NEG mg/dL    Ketone NEGATIVE  NEG mg/dL    Bilirubin NEGATIVE  NEG      Blood NEGATIVE  NEG      Urobilinogen 0.2 0.2 - 1.0 EU/dL    Nitrites NEGATIVE  NEG      Leukocyte Esterase NEGATIVE  NEG     CREATININE, UR, RANDOM    Collection Time: 11/18/18  8:30 PM   Result Value Ref Range    Creatinine, urine 148.00 (H) 30 - 125 mg/dL   PROTEIN/CREATININE RATIO, URINE    Collection Time: 11/18/18  8:30 PM   Result Value Ref Range    Protein, urine random 42 (H) <11.9 mg/dL    Creatinine, urine 148.00 (H) 30 - 125 mg/dL    Protein/Creat.  urine Ratio 0.3     SODIUM, UR, RANDOM    Collection Time: 11/18/18  8:30 PM   Result Value Ref Range    Sodium,urine random 40 20 - 110 MMOL/L   URINE MICROSCOPIC ONLY    Collection Time: 11/18/18  8:30 PM   Result Value Ref Range    WBC NEGATIVE  0 - 4 /hpf    RBC NEGATIVE  0 - 5 /hpf    Epithelial cells 1+ 0 - 5 /lpf    Bacteria 1+ (A) NEG /hpf    Amorphous Crystals 2+ (A) NEG   TYPE + CROSSMATCH    Collection Time: 11/18/18  8:40 PM   Result Value Ref Range    Crossmatch Expiration 11/21/2018     ABO/Rh(D) A POSITIVE     Antibody screen NEG     Comment TEST DONE ON REDRAW FIRST DRAW CONTAMINATED     Unit number [id number]     Blood component type RC LR,2     Unit division 00     Status of unit ISSUED     Crossmatch result Compatible     Unit number [id number]     Blood component type RC LR,2     Unit division 00     Status of unit ISSUED     Crossmatch result Compatible     Unit number [id number]     Blood component type RC LR,2     Unit division 00     Status of unit ALLOCATED     Crossmatch result Compatible    PLATELETS, ALLOCATE    Collection Time: 11/18/18 10:00 PM   Result Value Ref Range    CALLED TO: ALL SALAS, AT 2022 ON [id number] BY [facility]     Unit number [id number]     Blood component type [facility] LR,PAS1     Unit division 00     Status of unit ALLOCATED     Unit number [id number]     Blood component type [facility] LR,PAS2     Unit division 00     Status of unit ALLOCATED            No results for input(s): FIO2I, IFO2, HCO3I, IHCO3, HCOPOC, PCO2I, PCOPOC, IPHI, PHI, PHPOC, PO2I, PO2POC in the last 72 hours. No lab exists for component: IPOC2    No results found for: SDES, SREQ, CULT, RPT    Telemetry: normal sinus rhythm    Imaging:  [x]I have personally reviewed the patients chest radiographs images and report     Chest x-ray and CT pending    No results found for this or any previous visit. IMPRESSION:   · Acute blood loss anemia - likely spontaneous bleed post breast biopsy as patient restarted Eliquis - currently received PRBC and Platelets. · Hypovolemic shock - improving with IVF and PRBC  · BULL on CKD3   · DM2  · CAD s/p PCI 2011  · Hx HTN  · Hx Gastric Bypass  · Hx Atrial fibrillation on Eliquis at home   RECOMMENDATIONS:   Resp: Supplemental O2 via NC, titrate flow for goal SPO2> 90%, pulmonary hygiene care, Aspiration precautions, Keep HOB >30 degrees  ID: Sepsis bundle per hospital protocol initiated in ED, Blood cultures sent and  U/A completed. Patient given Levaquin and Zosyn in ED. Hypotension likely due to hypovolemia and less infection related as has responded to IVF and PRBCs. Will hold on additional abx at this time. Afebrile with Aleukocytosis. Will continue to monitor for S/S of infection. Heme/Onc: Acute blood loss anemia, Received 2 units of PRBC's in ED, pt to also receive 2 units of Platlets tonight. Repeat CBC post transfusions and continue to check H/H q 6hrs. Transfuse as needed for Hbg >7.0. Keep 1 unit ahead. Surgery following - holding off on procedure as patient is responding to blood and IVF. Will continue to monitor closely. CVS: Hypotension improved with volume replacement.  Continue to monitor closely. Hold Home B/P medications and BB. NSR. Fluids: NS@ 100 ml/hr  Renal: Lamb placed in ED, continue to BSD, Trend Renal indices. Check E-lytes and replaced as needed. GI: SUP, Trend LFTs, Zofran PRN for N/V   Metabolic: Glycemic control -SSI PRN  Neuro/Sedation/Pain: No acute issues at present. No sedation. Stress ulcer prophylaxis: Pepcid   DVT prophylaxis: Contraindicated due to active bleed. AM labs: Daily CBC BMP Mag and Phos. Diet: Keep NPO  Lines/Devices: PIVs, Lamb catheter. Lamb Bundle Followed    Patient states that she is a DNR and has completed paperwork at her physicians office. Would like to continue DNR status at this time. Further recommendations will be based on the patient's response to recommended treatment and results of the investigation ordered. [x]See my orders for details    My assessment, plan of care, findings, medications, side effects etc were discussed with:  [x]nursing []PT/OT    []respiratory therapy [x]Dr. Geetha Velazquez, Dr. Reinaldo Lang   [x]family [x]Patient     [x]Total critical care time exclusive of procedures 45 minutes with complex decision making performed and > 50% time spent in face to face evaluation.     [facility]     Pulmonary, Critical Care Medicine  [facility]

## 2018-11-19 NOTE — ED TRIAGE NOTES
Pt states she had a breast biopsy on Tuesday on right breast, pt arrived c/o dizziness and \"feeling tired\" at home. Pt is hypotensive currently otherwise normal vitals. Pt has large area of ecchymosis covering right breast, right flank and right upper back. Pt denies pain, states she has not been eating due to nausea,  states she has been drinking water.

## 2018-11-19 NOTE — PROGRESS NOTES
Surgery Pt well known to me for a breast Bx last week who presents hypotensive from a large hematoma encompassing her breast chest wall and flank. She is being resuscitated in ED but has a Hbn of 6 and a creatinine of 5. She is awake and responsive and her wound is soft. She began her Eliquist about 3 days ago. Plan ongoing recitation 2 units of platelets, and transfer to ICU. Will hold on surgery as she is anticoagulated and appears to be responding well to fluids and blood

## 2018-11-19 NOTE — PROGRESS NOTES
[facility] Pulmonary, Critical Care, and Sleep Medicine ICU Patient Progress Note Name: David Gonzalez : 1945 MRN: [id number] Date: 2018 Subjective/History:  
 
David Gonzalez is a 72 y.o. female who presented to the ED with a PMHx of HTN, DM, CAD with PCI, and Atrial Fibrillation on Eliquis at home, who presented to the ED with worsening generalized weakness over the last few days and associated symptoms of dizziness, lightheadedness, and nausea with vomiting. Found to be hypotensive with systolic in the 62'V and in acute renal failure. Hypovolemic shock s/p breast biopsy and on Eliquis. H/H 6.1 and 18.5. Transferred to ICU 
 
18 Patient awake alert and oriented in NAD, Family is at bedside S/P 4 units PRBCs and 2 Plts. Awaiting recheck CBC. Surgery following Now normotensive post transfusions and  IVF - continue maitainence fluids Afebrile Review of Systems: 
Constitutional: positive for fatigue Eyes: negative Ears, nose, mouth, throat, and face: negative Respiratory: negative Cardiovascular: positive for near-syncope, fatigue, dizziness Gastrointestinal: negative Genitourinary:positive for decreased urine Integument/breast: positive for large area of bruising Hematologic/lymphatic: positive for easy bruising Musculoskeletal:negative Neurological: negative Behavioral/Psych: negative Endocrine: negative Allergic/Immunologic: negative Past Medical History: 
Past Medical History:  
Diagnosis Date  Arrhythmia Atrial Fibrillation  CAD (coronary artery disease)  Diabetes ([facility] [address]  Hypertension Past Surgical History: 
Past Surgical History:  
Procedure Laterality Date  BREAST SURGERY PROCEDURE UNLISTED    
 left breast bx- benign  CARDIAC SURG PROCEDURE UNLIST   or 2010 Coronary Stent [address] Medications: Prior to Admission medications Medication Sig Start Date End Date Taking? Authorizing Provider  
ondansetron (ZOFRAN ODT) 4 mg disintegrating tablet Take 1 Tab by mouth every eight (8) hours as needed for Nausea. 11/15/18  Yes Deandre Jones MD  
oxyCODONE-acetaminophen (PERCOCET) 5-325 mg per tablet Take 1 Tab by mouth every six (6) hours as needed for Pain. Max Daily Amount: 4 Tabs. 18  Yes Deandre Jones MD  
metFORMIN (GLUCOPHAGE) 500 mg tablet Take  by mouth two (2) times daily (with meals). Yes Provider, Historical  
lisinopril-hydroCHLOROthiazide (PRINZIDE, ZESTORETIC) 20-12.5 mg per tablet Take  by mouth daily. Yes Provider, Historical  
simvastatin (ZOCOR) 40 mg tablet Take 40 mg by mouth nightly. Yes Provider, Historical  
metoprolol tartrate (LOPRESSOR) 50 mg tablet Take  by mouth daily. Yes Provider, Historical  
Liraglutide (VICTOZA 3-LUIS) 0.6 mg/0.1 mL (18 mg/3 mL) pnij 0.6 mg by SubCUTAneous route. Yes Provider, Historical  
apixaban (ELIQUIS) 5 mg tablet Take 5 mg by mouth two (2) times a day. Yes Provider, Historical  
enoxaparin (LOVENOX) 100 mg/mL by SubCUTAneous route every twelve (12) hours. Provider, Historical  
 
 
Current Facility-Administered Medications Medication Dose Route Frequency  0.9% sodium chloride infusion  100 mL/hr IntraVENous CONTINUOUS  
 insulin lispro (HUMALOG) injection   SubCUTAneous Q6H  
 famotidine (PF) (PEPCID) 20 mg in sodium chloride 0.9% 10 mL injection  20 mg IntraVENous DAILY Allergy: No Known Allergies Social History: 
Social History Tobacco Use  Smoking status: Former Smoker Last attempt to quit: 1992 Years since quittin.0  Smokeless tobacco: Never Used Substance Use Topics  Alcohol use: Yes Comment: oc  Drug use: No  
  
 
Family History: 
Family History Problem Relation Age of Onset  Cancer Mother 72  
     lung Objective: 
Vital Signs:   
Visit Vitals /57 Pulse 78 Temp 98.5 °F (36.9 °C) Resp 12 Ht 5' 6\" (1.676 m) Wt 108.9 kg (240 lb) SpO2 100% Breastfeeding? No  
BMI 38.74 kg/m² O2 Device: Room air Temp (24hrs), Av.1 °F (36.7 °C), Min:97.3 °F (36.3 °C), Max:98.6 °F (37 °C) Patient Vitals for the past 8 hrs: 
 Temp Pulse Resp BP SpO2  
18 0620 98.5 °F (36.9 °C)      
18 0600 98 °F (36.7 °C)      
18 0540  78 12  100 % 18 0530  81 12 116/57 98 % 18 0520  77 13  99 % 18 0515  80 14 119/59 99 % 18 0510  81 12  98 % 18 0502 98.2 °F (36.8 °C)      
18 0500  80 15 115/58 98 % 18 0450  80 13  97 % 18 0445  82 15 95/42 97 % 18 0441 98.3 °F (36.8 °C)      
18 0440  80 15  97 % 18 0430  82 12 97/52 98 % 18 0415  81 14 95/40 97 % 18 0400 98.3 °F (36.8 °C) 81 13 94/62 98 % 18 0345  79 14 111/54 98 % 18 0330 97.9 °F (36.6 °C) 82 14 95/51 96 % 18 0315  83 14 104/54 97 % 18 0300  81 15 90/47 96 % 18 0245  83 17 94/47 98 % 18 0230  79 14 95/48 98 % 18 0215  80 15 (!) 87/49 98 % 18 0200 98.3 °F (36.8 °C) 83 14 94/47 99 % 18 0145  82 16 92/51 98 % 11/19/18 0130  82 13 102/54 100 % 18 0120 97.7 °F (36.5 °C) 83 15 (!) 75/46   
18 0115  84 15 (!) 75/61 99 % 18 0110  83 15  100 % 18 0100 98 °F (36.7 °C) 87 16 101/49 (!) 25 % 18 0050  85 14  99 % 18 0046 97.9 °F (36.6 °C)   (!) 83/44   
18 0045  86 14 (!) 74/40 100 % 18 0040  93 22  97 % 18 0030  83 14 91/50 99 % Intake/Output:  
Last shift:      No intake/output data recorded. Last 3 shifts:  1901 -  0700 In: 7327.6 [I.V.:5050.3] Out: 1900 [KTKBE:7981] Intake/Output Summary (Last 24 hours) at 2018 7721 Last data filed at 2018 9684 Gross per 24 hour Intake 7327.61 ml Output 1900 ml Net 5427.61 ml Physical Exam: 
General appearance - in no respiratory distress Mental status - alert, oriented to person, place, and time, affect appropriate to mood Eyes - pupils equal and reactive, extraocular eye movements intact, sclera anicteric Mouth - mucous membranes moist, pharynx normal without lesions Neck - supple, no significant adenopathy, bilateral symmetric anterior adenopathy Chest - clear to auscultation, no wheezes, rales or rhonchi, symmetric air entry, no tachypnea, retractions or cyanosis Heart - normal rate, regular rhythm, normal S1, S2, no murmurs, rubs, clicks or gallops Abdomen - soft, nontender, nondistended, no masses or organomegaly Breasts - Right breast ecchymotic with large hematoma that extends to left chest and down and around to back. Neurological - alert, oriented, normal speech, no focal findings or movement disorder noted Musculoskeletal - no joint tenderness, deformity or swelling Extremities - peripheral pulses normal, no pedal edema, no clubbing or cyanosis Skin - large area of ecchymosis from right breast down and around to back, decreased turgor, no rashes, no suspicious skin lesions noted Data:  
 
Recent Results (from the past 24 hour(s)) CULTURE, BLOOD Collection Time: 11/18/18  6:50 PM  
Result Value Ref Range Special Requests: NO SPECIAL REQUESTS Culture result: NO GROWTH AFTER 11 HOURS METABOLIC PANEL, COMPREHENSIVE Collection Time: 11/18/18  6:50 PM  
Result Value Ref Range Sodium 137 136 - 145 mmol/L Potassium 5.1 3.5 - 5.5 mmol/L Chloride 107 100 - 108 mmol/L  
 CO2 17 (L) 21 - 32 mmol/L Anion gap 13 3.0 - 18 mmol/L Glucose 217 (H) 74 - 99 mg/dL BUN 97 (H) 7.0 - 18 MG/DL Creatinine 5.95 (H) 0.6 - 1.3 MG/DL  
 BUN/Creatinine ratio 16 12 - 20 GFR est AA 8 (L) >60 ml/min/1.73m2 GFR est non-AA 7 (L) >60 ml/min/1.73m2  Calcium 7.5 (L) 8.5 - 10.1 MG/DL  
 Bilirubin, total 0.8 0.2 - 1.0 MG/DL  
 ALT (SGPT) 14 13 - 56 U/L  
 AST (SGOT) 11 (L) 15 - 37 U/L Alk. phosphatase 67 45 - 117 U/L Protein, total 5.7 (L) 6.4 - 8.2 g/dL Albumin 2.5 (L) 3.4 - 5.0 g/dL Globulin 3.2 2.0 - 4.0 g/dL A-G Ratio 0.8 0.8 - 1.7    
CBC WITH AUTOMATED DIFF Collection Time: 11/18/18  6:50 PM  
Result Value Ref Range WBC 9.1 4.6 - 13.2 K/uL  
 RBC 2.01 (L) 4.20 - 5.30 M/uL HGB 6.1 (L) 12.0 - 16.0 g/dL HCT 18.5 (L) 35.0 - 45.0 % MCV 92.0 74.0 - 97.0 FL  
 MCH 30.3 24.0 - 34.0 PG  
 MCHC 33.0 31.0 - 37.0 g/dL  
 RDW 15.5 (H) 11.6 - 14.5 % PLATELET 230 313 - 753 K/uL MPV 9.5 9.2 - 11.8 FL  
 NEUTROPHILS 75 42 - 75 % BAND NEUTROPHILS 4 0 - 5 % LYMPHOCYTES 17 (L) 20 - 51 % MONOCYTES 4 2 - 9 % EOSINOPHILS 0 0 - 5 % BASOPHILS 0 0 - 3 % NRBC 3.0 (H) 0  WBC  
 ABS. NEUTROPHILS 7.2 1.8 - 8.0 K/UL  
 ABS. LYMPHOCYTES 1.5 0.8 - 3.5 K/UL  
 ABS. MONOCYTES 0.4 0 - 1.0 K/UL  
 ABS. EOSINOPHILS 0.0 0.0 - 0.4 K/UL  
 ABS. BASOPHILS 0.0 0.0 - 0.06 K/UL  
 DF MANUAL PLATELET COMMENTS ADEQUATE PLATELETS    
 RBC COMMENTS ANISOCYTOSIS 2+ 
    
 RBC COMMENTS POLYCHROMASIA 1+ CARDIAC PANEL,(CK, CKMB & TROPONIN) Collection Time: 11/18/18  6:50 PM  
Result Value Ref Range  26 - 192 U/L  
 CK - MB <1.0 <3.6 ng/ml CK-MB Index  0.0 - 4.0 % CALCULATION NOT PERFORMED WHEN RESULT IS BELOW LINEAR LIMIT Troponin-I, Qt. <0.02 0.0 - 0.045 NG/ML  
PROTHROMBIN TIME + INR Collection Time: 11/18/18  6:50 PM  
Result Value Ref Range Prothrombin time 16.1 (H) 11.5 - 15.2 sec INR 1.3 (H) 0.8 - 1.2 PTT Collection Time: 11/18/18  6:50 PM  
Result Value Ref Range aPTT 40.6 (H) 23.0 - 36.4 SEC MAGNESIUM Collection Time: 11/18/18  6:50 PM  
Result Value Ref Range Magnesium 2.4 1.6 - 2.6 mg/dL POC LACTIC ACID Collection Time: 11/18/18  6:56 PM  
Result Value Ref Range Lactic Acid (POC) 2.22 (HH) 0.40 - 2.00 mmol/L  
EKG, 12 LEAD, INITIAL Collection Time: 11/18/18  7:09 PM  
Result Value Ref Range Ventricular Rate 73 BPM  
 Atrial Rate 73 BPM  
 P-R Interval 148 ms QRS Duration 82 ms Q-T Interval 370 ms QTC Calculation (Bezet) 407 ms Calculated P Axis 15 degrees Calculated R Axis -3 degrees Calculated T Axis 9 degrees Diagnosis Normal sinus rhythm Inferior infarct , age undetermined Possible Anterior infarct , age undetermined Abnormal ECG Confirmed by Laura Che MD, Trinidad Adams ([id number]) on 11/18/2018 8:29:30 PM 
  
CULTURE, BLOOD Collection Time: 11/18/18  7:13 PM  
Result Value Ref Range Special Requests: NO SPECIAL REQUESTS Culture result: NO GROWTH AFTER 11 HOURS    
URINALYSIS W/ RFLX MICROSCOPIC Collection Time: 11/18/18  8:30 PM  
Result Value Ref Range Color YELLOW Appearance CLOUDY Specific gravity 1.014 1.005 - 1.030    
 pH (UA) 5.0 5.0 - 8.0 Protein TRACE (A) NEG mg/dL Glucose NEGATIVE  NEG mg/dL Ketone NEGATIVE  NEG mg/dL Bilirubin NEGATIVE  NEG Blood NEGATIVE  NEG Urobilinogen 0.2 0.2 - 1.0 EU/dL Nitrites NEGATIVE  NEG Leukocyte Esterase NEGATIVE  NEG    
CREATININE, UR, RANDOM Collection Time: 11/18/18  8:30 PM  
Result Value Ref Range Creatinine, urine 148.00 (H) 30 - 125 mg/dL PROTEIN/CREATININE RATIO, URINE Collection Time: 11/18/18  8:30 PM  
Result Value Ref Range Protein, urine random 42 (H) <11.9 mg/dL Creatinine, urine 148.00 (H) 30 - 125 mg/dL Protein/Creat. urine Ratio 0.3 SODIUM, UR, RANDOM Collection Time: 11/18/18  8:30 PM  
Result Value Ref Range Sodium,urine random 40 20 - 110 MMOL/L  
URINE MICROSCOPIC ONLY Collection Time: 11/18/18  8:30 PM  
Result Value Ref Range WBC NEGATIVE  0 - 4 /hpf  
 RBC NEGATIVE  0 - 5 /hpf Epithelial cells 1+ 0 - 5 /lpf Bacteria 1+ (A) NEG /hpf  Amorphous Crystals 2+ (A) NEG  
 TYPE + CROSSMATCH Collection Time: 11/18/18  8:40 PM  
Result Value Ref Range Crossmatch Expiration 11/21/2018 ABO/Rh(D) A POSITIVE Antibody screen NEG Comment TEST DONE ON REDRAW FIRST DRAW CONTAMINATED Unit number [id number] Blood component type RC LR,2 Unit division 00 Status of unit TRANSFUSED Crossmatch result Compatible Unit number [id number] Blood component type RC LR,2 Unit division 00 Status of unit TRANSFUSED Crossmatch result Compatible Unit number [id number] Blood component type RC LR,2 Unit division 00 Status of unit ISSUED Crossmatch result Compatible Unit number [id number] Blood component type RC LR Unit division 00 Status of unit ISSUED Crossmatch result Compatible PLATELETS, ALLOCATE Collection Time: 11/18/18 10:00 PM  
Result Value Ref Range CALLED TO: ALL SALAS, AT 2022 ON [id number] BY [facility] Unit number [id number] Blood component type PLP LR,PAS1 Unit division 00 Status of unit TRANSFUSED Unit number [id number] Blood component type PLP LR,PAS2 Unit division 00 Status of unit TRANSFUSED   
HGB & HCT Collection Time: 11/18/18 10:20 PM  
Result Value Ref Range HGB 6.8 (L) 12.0 - 16.0 g/dL HCT 20.4 (L) 35.0 - 45.0 % METABOLIC PANEL, BASIC Collection Time: 11/18/18 10:20 PM  
Result Value Ref Range Sodium 141 136 - 145 mmol/L Potassium 5.2 3.5 - 5.5 mmol/L Chloride 115 (H) 100 - 108 mmol/L  
 CO2 18 (L) 21 - 32 mmol/L Anion gap 8 3.0 - 18 mmol/L Glucose 117 (H) 74 - 99 mg/dL BUN 86 (H) 7.0 - 18 MG/DL Creatinine 4.62 (H) 0.6 - 1.3 MG/DL  
 BUN/Creatinine ratio 19 12 - 20 GFR est AA 11 (L) >60 ml/min/1.73m2 GFR est non-AA 9 (L) >60 ml/min/1.73m2 Calcium 6.5 (L) 8.5 - 10.1 MG/DL MAGNESIUM Collection Time: 11/18/18 10:20 PM  
Result Value Ref Range Magnesium 2.1 1.6 - 2.6 mg/dL PHOSPHORUS  
 Collection Time: 11/18/18 10:20 PM  
Result Value Ref Range Phosphorus 5.2 (H) 2.5 - 4.9 MG/DL  
CALCIUM, IONIZED Collection Time: 11/18/18 10:20 PM  
Result Value Ref Range Ionized Calcium 0.99 (L) 1.12 - 1.32 MMOL/L  
GLUCOSE, POC Collection Time: 11/19/18  6:10 AM  
Result Value Ref Range Glucose (POC) 111 (H) 70 - 110 mg/dL No results for input(s): FIO2I, IFO2, HCO3I, IHCO3, HCOPOC, PCO2I, PCOPOC, IPHI, PHI, PHPOC, PO2I, PO2POC in the last 72 hours. No lab exists for component: IPOC2 Special Requests:  
Date Value Ref Range Status 11/18/2018 NO SPECIAL REQUESTS   Preliminary Culture result:  
Date Value Ref Range Status 11/18/2018 NO GROWTH AFTER 11 HOURS   Preliminary Telemetry: normal sinus rhythm Imaging: 
[x]I have personally reviewed the patients chest radiographs images and report Chest x-ray and CT pending No results found for this or any previous visit. IMPRESSION:  
· Acute blood loss anemia - likely spontaneous bleed post breast biopsy as patient restarted Eliquis - received PRBC and Platelets. · Hypotension- improving with IVF and PRBC · BULL on CKD3 - renal indices improved. UOP improved. · DM2 
· CAD s/p PCI 2011 · Hx HTN 
· Hx Gastric Bypass · Hx Atrial fibrillation on Eliquis at home RECOMMENDATIONS:  
Resp: Supplemental O2 via NC, titrate flow for goal SPO2> 90%, pulmonary hygiene care, Aspiration precautions, Keep HOB >30 degrees ID: Sepsis bundle per hospital protocol initiated in ED, Blood cultures sent and  U/A completed. Patient given Levaquin and Zosyn in ED. Hypotension likely due to hypovolemia and less infection related as has responded to IVF and PRBCs. Will hold on additional abx at this time. Afebrile with Aleukocytosis. Will continue to monitor for S/S of infection. Heme/Onc: Acute blood loss anemia, now s/p 4 units of PRBCs and 2 units of Platelets. Repeat CBC post transfusions and continue to check H/H q 6hrs. Transfuse as needed for Hbg >7.0. Keep 1 unit ahead. Surgery following - holding off on procedure as patient is responding to blood and IVF. Will continue to monitor closely. CT chest abd and pelv cancelled by surgery. CVS: Hypotension improved with volume replacement. Continue to monitor closely. Hold Home B/P medications and BB. NSR. Fluids: NS@ 100 ml/hr Renal: Lamb placed in ED, if patient continues good UOP consider discontinuing. Trend Renal indices. Check E-lytes and replaced as needed. GI: SUP, Trend LFTs, Zofran PRN for N/V Metabolic: Glycemic control -SSI PRN Neuro/Sedation/Pain: No acute issues at present. No sedation. Stress ulcer prophylaxis: Pepcid DVT prophylaxis: Contraindicated due to active bleed. AM labs: Daily CBC BMP Mag and Phos. Diet: Keep NPO, Restart diet once ok with surgery. Lines/Devices: PIVs, Lamb catheter. Lamb Bundle Followed DNR To be discussed in interdisciplinary rounds. [x]See my orders for details My assessment, plan of care, findings, medications, side effects etc were discussed with: 
[x]nursing []PT/OT   
[]respiratory therapy [x]Dr. Silke Vang  
[]family [x]Patient [x]Total critical care time exclusive of procedures  minutes with complex decision making performed and > 50% time spent in face to face evaluation. Jessica Michaels [facility] Pulmonary, Critical Care Medicine [facility]

## 2018-11-19 NOTE — ED NOTES
7:00 PM :Pt care assumed from Dr. Rashida Sands, ED provider. Pt complaint(s), current treatment plan, progression and available diagnostic results have been discussed thoroughly. Rounding occurred: yes Intended Disposition: TBD Pending diagnostic reports and/or labs (please list): UA, CXR 
 
 
8:30 PM: Talked to pt about vallecillo catheter. Pt is agreeable. 8:40 PM: Consult:  Discussed care with Dr. Anamaria Frias, Specialty: Hospitalist  Standard discussion; including history of patients chief complaint, available diagnostic results, and treatment course. Agrees with patient admission to ICU. 8:45 PM: Consult:  Discussed care with Dr. Cate Gerardo, Specialty: Nephrology Standard discussion; including history of patients chief complaint, available diagnostic results, and treatment course. States pt does not need dialysis and agrees with patient going to the ICU. 8:53 PM: Consult: Discussed care with Dr. Markus oMntaño, Specialty: Surgery  Standard discussion; including history of patients chief complaint, available diagnostic results, and treatment course. He is coming in to see the patient. He has ordered 2 units of blood. States to cancel CT and stop IV abx. Patient presented with BP 70/30 IVF and sepsis protocol initially ordered Patient with Hb 6.1, down from 12.1 6 days ago Large hematoma to R chest and back, no bleeding from wound Patient had restarted her eliquis on Wednesday. Cr 5, up from 1 Nephrology consulted Vallecillo placed for UOP measurement Medicine called, but Dr. Markus Montaño will admit to ICU 
D/w Dr. Lida Anguiano who agrees with admission to ICU Patient had 2 large bore IV with NS wide open. Ordered blood--Dr. Delarosa called blood bank to ask for 2 units emergency release, as well as 2 units of platelets Does not want CT until pt is resuscitated Abx stoped as no evidence for infection and hypovolemia as source of hypotension Patient improved but still in critical condition at time of admission to ICU 
 
 Critical Care Time: The services I provided to this patient were to treat and/or prevent clinically significant deterioration that could result in the failure of one or more body systems and/or organ systems due to hypotension, severe anemia from blood loss, acute renal insufficiency. Services included the following: 
-reviewing nursing notes and old charts 
-vital sign assessments 
-direct patient care 
-medication orders and management 
-interpreting and reviewing diagnostic studies/labs 
-re-evaluations 
-documentation time Aggregate critical care time was 60 minutes, which includes only time during which I was engaged in work directly related to the patient's care as described above, whether I was at bedside or elsewhere in the Emergency Department. It did not include time spent performing other reported procedures or the services of residents, students, nurses, or advance practice providers.  
 
  
Zee Saavdera,  
9:35 PM

## 2018-11-19 NOTE — PROGRESS NOTES
Talked with Dr. Tayo Cervantes via telephone on PT's status and current H/H. Per Dr. Tayo Cervantes goal is Hct of between 27-30, continue with PRBC transfusions and cx CT scan at this time.

## 2018-11-19 NOTE — CONSULTS
[address] [facility]    Dulce Queen  MR#: [id number]  : 1945  ACCOUNT #: [de-identified]   DATE OF SERVICE: 2018    REASON FOR CONSULTATION:  Acute renal failure. Consult was requested by the emergency room physician. HISTORY OF PRESENT ILLNESS:  This pleasant 72-year-old  female who has long-term history of hypertension, type 2 diabetes mellitus as well as atrial fibrillation, came to the emergency room last night with generalized weakness over the last few days. The patient does not have any history of renal failure in the past.  She said the weakness and tiredness as her symptoms included dizziness, lightheadedness, nausea and vomiting for the last few days. She denies any urinary complaints, any flank pain, any urgency, any hesitancy, any hematuria, any dysuria. No fever. The patient had recently undergone a right breast biopsy by Dr. Kaleigh Mccann, for suspicion of breast cancer. Biopsy report reviewed, does not have any carcinoma, no carcinoma in situ. Patient was treated before this procedure with the Eliquis for her atrial fibrillation, but before surgery, she was on hold Eliquis and being treated with Lovenox and after surgery, she restarted Eliquis on last Wednesday. Subsequently, she noticed gradually she was having some ecchymosis around both breasts, more on the right than the left and also going to the back. Since then, he has not been eating or drinking much. Further evaluation in the emergency room found that the patient has very abnormal renal functions with a very low hemoglobin. For that reason, I was consulted. After reviewing the history as briefly as possible from the emergency room, I recommended to give hydration and hopefully her renal function will improve and possibly will require transfusion and avoid nephrotoxins.   Subsequently, reviewed the record and found that the patient was taking ACE inhibitor as well as metformin for a long period of time. She does not have any history of renal failure. She just admits that occasionally she takes NSAIDs off and on for joint pain. Patient denies any chest pain, any shortness of breath, any palpitation. No cough, no hematemesis, no melena, no fever, no chills. In the emergency room, the patient was found to be hypotensive with a systolic blood pressure as low as 60s and low hemoglobin and acute renal failure. The patient was given aggressive hydration, received 4 L of normal saline and 2 units of packed red blood cells. Also, 2 units of platelets being given. Subsequently, a Lamb was placed. Since then, she has been making good volume of urine. Urine looks clear. No apparent blood in the urine. PAST MEDICAL HISTORY:  As I mentioned including hypertension, type 2 diabetes mellitus, atrial fibrillation, coronary artery disease. PAST SURGICAL HISTORY:  Breast surgery on the right breast for  questionable breast cancer, coronary stent, history of gastric bypass surgery, hysterectomy, laparoscopic cholecystectomy. MEDICATIONS:  Before admission were Zofran, Percocet, Lovenox, metformin 500 mg 2 times daily, lisinopril along with the hydrochlorothiazide 12/12.5, Zocor 40 mg daily, Lopressor 50 mg tablet daily, Victoza 0.6 mg subcu, Eliquis 5 mg 2 times daily. ALLERGIES:  NO KNOWN DRUG ALLERGIES. SOCIAL HISTORY:  Former smoker, quit smoking in 1992. Had been smoking almost 26 years. No drugs. Occasional social drinking. FAMILY HISTORY:  History of lung cancer in the mother. PHYSICAL EXAMINATION:    VITAL SIGNS:  In the emergency room, temperature 98.0, heart rate 82, blood pressure 98/54, oxygen saturation 99%, heart rate 82 per minute. Weight is 108.9 kilograms. Last vitals:  Afebrile, temperature 98, heart rate 78, blood pressure 116/57, respirations 12, oxygen saturation of 100% at room air. GENERAL:  She is alert, awake and oriented, without acute distress. Clear mental status. HEENT:  Pale mucosa. NECK:  Jugular venous pressure not distended. LUNGS:  Good air entry into both sides. HEART:  S1, S2, without any gallop or murmur. ABDOMEN:  Has a scar from the previous surgery. BREASTS:  Both breast have some ecchymosis but as per her the ecchymosis definitely decreasing since admission, but is more prominent on the right side than the left. EXTREMITIES:  Ankle:  Negative edema. No calf muscle tenderness. RELEVANT LABORATORY AND IMAGING DATA:  On admission 11/18/2018 WBC of 9.1 with a hemoglobin of only 6.1, hematocrit of 18.5, platelets of 475,095 compared to the baseline hemoglobin of 12.1 as of 11/06/2018 . Kristopher Counter Last CBC at 9 o'clock this morning:  WBC 5.5, hemoglobin improved to 8.3, hematocrit 24.5, platelets 532,290, neutrophils 76%, lymphocytes 15%. Chemistry on admission:  Sodium 137, potassium 5.1, chloride 107, CO2 of 17, anion gap of 13, glucose of 217, blood urea nitrogen as high as 97 with a creatinine of 5.95 compared to the baseline BUN of 25 and a creatinine of 1.21 on the 6th of this month. Chemistry this morning:  Sodium 141, potassium 5.2, chloride 115, CO2 of 18, anion gap 228, glucose 117 , Bun & creatinine started coming down ,Bun  86, creatinine of 4.62, calcium is 6.52, phosphorus 5.2. Her albumin was checked last night, it was 2.5, so the corrected calcium is 8.0.   CPK on admission was 132. MB fraction of less than 1 and troponin of less than 0.02. Urinalysis was done last night, specific gravity of 1.014, pH of 5.0, protein trace, glucose negative, ketones negative, bilirubin negative, nitrite negative, leukocyte esterase negative, epithelial cells 1, WBC negative, RBC negative, bacteria 1+, amorphous crystals 2+. Urine chemistry data requested. Urine sodium is 40, urine creatinine is 148. Protein creatinine ratio is 0.3. Chest x-ray was done, shows no acute findings. PT/INR 16.1 and 1.3, PTT of 40.6.   EKG:  Normal sinus rhythm, possible anterior infarct, age undetermined. IMPRESSION AND PLAN:  1. Acute renal failure. Acute renal failure certainly due to volume loss and use of ACE inhibitor in prerenal setting. She had low blood pressure in the setting of volume loss, definitely that caused the poor perfusion of the kidneys in addition to the use of ACE inhibitor. Patient is at risk for acute tubular necrosis but volume replacement definitely is helping her. I expect with volume replacement, her kidney function will normalize in a matter of time. 2.  I doubt any lactic acidosis, but bicarbonate a little bit on the lower side. Given the history of metformin use and acute renal failure, we should check a blood gas as well as check serum lactic acid. 3.  In the meanwhile, continue to hydrate her and keep eyes on in and out record. Keep an eye on the potassium, potassium is on the high normal side. Keep an eye on also hemoglobin and hematocrit. Depending on the hemoglobin and hematocrit, decide whether she needs any further transfusion or not. Also, check stool for Hemoccult. For the time being, no need to use any metformin. No ACE inhibitor. No NSAIDs. Once the acute renal failure resolves, hopefully it will be done in the next few days, no more metformin or ACE inhibitor or any diuretics for the time being should be given to her. Also, no NSAIDs, even though she takes on needed basis. Further recommendations will be given based on the hospital course.       Deidre Swain MD       [facility] / Franki Correa  D: 11/19/2018 10:39     T: 11/19/2018 11:23  JOB #: [id number]

## 2018-11-19 NOTE — PROGRESS NOTES
Surgery Much improved throughout the day. Mental status improved, color improved Afebrile with stable vital signs and blood pressures above 100 with heart rates in the 70s. Ecchymosis unchanged, wound unchanged. Hematocrit 24% Renal function much improved and creatinine now 3.0 No signs of ongoing bleeding Advance diet Significant improvement with resuscitation over the evening

## 2018-11-19 NOTE — PROGRESS NOTES
attended the interdisciplinary rounds for Tk Salinas, who is a 72 y.o.,female. Patients Primary Language is: [place]. According to the patients EMR Hoah[employer]ism Affiliation is: Timothy.  
 
The reason the Patient came to the hospital is:  
Patient Active Problem List  
 Diagnosis Date Noted  Acute blood loss anemia 11/18/2018  Microcalcifications of the breast 04/23/2018 Plan: 
Chaplains will continue to follow and will provide pastoral care on an as needed/requested basis.  recommends bedside caregivers page  on duty if patient shows signs of acute spiritual or emotional distress. [address] [facility] [employer] [facility] Certified [employer] [facility]  
[phone]

## 2018-11-19 NOTE — PROGRESS NOTES
Problem: Falls - Risk of 
Goal: *Absence of Falls Document Debria Check Fall Risk and appropriate interventions in the flowsheet. Outcome: Progressing Towards Goal 
Fall Risk Interventions: 
  
 
  
 
Medication Interventions: Assess postural VS orthostatic hypotension, Evaluate medications/consider consulting pharmacy, Patient to call before getting OOB, Teach patient to arise slowly History of Falls Interventions: Bed/chair exit alarm, Evaluate medications/consider consulting pharmacy, Door open when patient unattended, Investigate reason for fall, Room close to nurse's station Problem: Pressure Injury - Risk of 
Goal: *Prevention of pressure injury Document Ernesto Scale and appropriate interventions in the flowsheet. Outcome: Progressing Towards Goal 
Pressure Injury Interventions: Activity Interventions: Pressure redistribution bed/mattress(bed type) Mobility Interventions: Assess need for specialty bed, HOB 30 degrees or less, Pressure redistribution bed/mattress (bed type), Turn and reposition approx. every two hours(pillow and wedges) Nutrition Interventions: Document food/fluid/supplement intake, Discuss nutritional consult with provider Friction and Shear Interventions: Apply protective barrier, creams and emollients, Foam dressings/transparent film/skin sealants, HOB 30 degrees or less, Lift sheet, Transferring/repositioning devices

## 2018-11-19 NOTE — PROGRESS NOTES
Pt arrived from ED via stretcher. Large right sided hematoma present extending from upper torso to back--appx 40% of surface area. PT denies any pain or tenderness on evaluation.

## 2018-11-19 NOTE — PROGRESS NOTES
Bedside shift change report given to Nakul Salinas RN (oncoming nurse) by Javi Chi RN (offgoing nurse). Report included the following information SBAR, Kardex, Procedure Summary, Intake/Output, MAR, Med Rec Status, Cardiac Rhythm NSR and Alarm Parameters .

## 2018-11-19 NOTE — PROGRESS NOTES
Acute Renal failure. Responding Nicely with Volume Re[letion. Use of ACEI in pre-renal Setting. Was on Metformin. Doubt any Lactic Acidosis. Drop on H/H following Breast Biopsy in a setting of using Blood thinner for atrial Fibrillation. Plan : Continue Hydration, Monitor renal function., H/H. Avoid Nephrotoxins. Check ABG, Lactic acid, No Metformin & any ACEI or any NSAIDS until Renal function remains completely normal for a while. Will follow  , thanks.

## 2018-11-20 VITALS
OXYGEN SATURATION: 99 % | HEART RATE: 75 BPM | BODY MASS INDEX: 40.11 KG/M2 | DIASTOLIC BLOOD PRESSURE: 84 MMHG | SYSTOLIC BLOOD PRESSURE: 133 MMHG | HEIGHT: 66 IN | WEIGHT: 249.56 LBS | RESPIRATION RATE: 18 BRPM | TEMPERATURE: 98.4 F

## 2018-11-20 PROBLEM — E87.20 METABOLIC ACIDOSIS: Status: ACTIVE | Noted: 2018-11-20

## 2018-11-20 PROBLEM — N17.9 ACUTE RENAL FAILURE (ARF) (HCC): Status: ACTIVE | Noted: 2018-11-20

## 2018-11-20 PROBLEM — I95.9 HYPOTENSION ARTERIAL: Status: ACTIVE | Noted: 2018-11-20

## 2018-11-20 LAB
ABO + RH BLD: NORMAL
ANION GAP SERPL CALC-SCNC: 6 MMOL/L (ref 3–18)
BASOPHILS # BLD: 0 K/UL (ref 0–0.1)
BASOPHILS NFR BLD: 1 % (ref 0–2)
BLD PROD TYP BPU: NORMAL
BLOOD BANK CMNT PATIENT-IMP: NORMAL
BLOOD GROUP ANTIBODIES SERPL: NORMAL
BPU ID: NORMAL
BUN SERPL-MCNC: 49 MG/DL (ref 7–18)
BUN/CREAT SERPL: 27 (ref 12–20)
CALCIUM SERPL-MCNC: 7.8 MG/DL (ref 8.5–10.1)
CHLORIDE SERPL-SCNC: 118 MMOL/L (ref 100–108)
CO2 SERPL-SCNC: 20 MMOL/L (ref 21–32)
CREAT SERPL-MCNC: 1.8 MG/DL (ref 0.6–1.3)
CROSSMATCH RESULT,%XM: NORMAL
DIFFERENTIAL METHOD BLD: ABNORMAL
EOSINOPHIL # BLD: 0.1 K/UL (ref 0–0.4)
EOSINOPHIL NFR BLD: 1 % (ref 0–5)
ERYTHROCYTE [DISTWIDTH] IN BLOOD BY AUTOMATED COUNT: 16.3 % (ref 11.6–14.5)
ERYTHROCYTE [DISTWIDTH] IN BLOOD BY AUTOMATED COUNT: 16.4 % (ref 11.6–14.5)
ERYTHROCYTE [DISTWIDTH] IN BLOOD BY AUTOMATED COUNT: 16.4 % (ref 11.6–14.5)
GLUCOSE BLD STRIP.AUTO-MCNC: 136 MG/DL (ref 70–110)
GLUCOSE BLD STRIP.AUTO-MCNC: 204 MG/DL (ref 70–110)
GLUCOSE SERPL-MCNC: 115 MG/DL (ref 74–99)
HCT VFR BLD AUTO: 25.6 % (ref 35–45)
HCT VFR BLD AUTO: 27.5 % (ref 35–45)
HCT VFR BLD AUTO: 28.8 % (ref 35–45)
HGB BLD-MCNC: 8.6 G/DL (ref 12–16)
HGB BLD-MCNC: 9.1 G/DL (ref 12–16)
HGB BLD-MCNC: 9.6 G/DL (ref 12–16)
LYMPHOCYTES # BLD: 1.2 K/UL (ref 0.9–3.6)
LYMPHOCYTES NFR BLD: 22 % (ref 21–52)
MCH RBC QN AUTO: 29.6 PG (ref 24–34)
MCH RBC QN AUTO: 29.8 PG (ref 24–34)
MCH RBC QN AUTO: 29.9 PG (ref 24–34)
MCHC RBC AUTO-ENTMCNC: 33.1 G/DL (ref 31–37)
MCHC RBC AUTO-ENTMCNC: 33.3 G/DL (ref 31–37)
MCHC RBC AUTO-ENTMCNC: 33.6 G/DL (ref 31–37)
MCV RBC AUTO: 88.6 FL (ref 74–97)
MCV RBC AUTO: 89.6 FL (ref 74–97)
MCV RBC AUTO: 89.7 FL (ref 74–97)
MONOCYTES # BLD: 0.5 K/UL (ref 0.05–1.2)
MONOCYTES NFR BLD: 10 % (ref 3–10)
NEUTS SEG # BLD: 3.8 K/UL (ref 1.8–8)
NEUTS SEG NFR BLD: 66 % (ref 40–73)
PLATELET # BLD AUTO: 242 K/UL (ref 135–420)
PLATELET # BLD AUTO: 252 K/UL (ref 135–420)
PLATELET # BLD AUTO: 276 K/UL (ref 135–420)
PMV BLD AUTO: 9.4 FL (ref 9.2–11.8)
PMV BLD AUTO: 9.4 FL (ref 9.2–11.8)
PMV BLD AUTO: 9.6 FL (ref 9.2–11.8)
POTASSIUM SERPL-SCNC: 4.4 MMOL/L (ref 3.5–5.5)
RBC # BLD AUTO: 2.89 M/UL (ref 4.2–5.3)
RBC # BLD AUTO: 3.07 M/UL (ref 4.2–5.3)
RBC # BLD AUTO: 3.21 M/UL (ref 4.2–5.3)
SODIUM SERPL-SCNC: 144 MMOL/L (ref 136–145)
SPECIMEN EXP DATE BLD: NORMAL
STATUS OF UNIT,%ST: NORMAL
UNIT DIVISION, %UDIV: 0
WBC # BLD AUTO: 5.6 K/UL (ref 4.6–13.2)
WBC # BLD AUTO: 5.9 K/UL (ref 4.6–13.2)
WBC # BLD AUTO: 6.6 K/UL (ref 4.6–13.2)

## 2018-11-20 PROCEDURE — 90686 IIV4 VACC NO PRSV 0.5 ML IM: CPT | Performed by: PHYSICIAN ASSISTANT

## 2018-11-20 PROCEDURE — 77030011256 HC DRSG MEPILEX <16IN NO BORD MOLN -A

## 2018-11-20 PROCEDURE — 85025 COMPLETE CBC W/AUTO DIFF WBC: CPT

## 2018-11-20 PROCEDURE — 90471 IMMUNIZATION ADMIN: CPT

## 2018-11-20 PROCEDURE — 36415 COLL VENOUS BLD VENIPUNCTURE: CPT

## 2018-11-20 PROCEDURE — 74011636637 HC RX REV CODE- 636/637: Performed by: INTERNAL MEDICINE

## 2018-11-20 PROCEDURE — 82962 GLUCOSE BLOOD TEST: CPT

## 2018-11-20 PROCEDURE — 85027 COMPLETE CBC AUTOMATED: CPT

## 2018-11-20 PROCEDURE — 80048 BASIC METABOLIC PNL TOTAL CA: CPT

## 2018-11-20 PROCEDURE — 74011250636 HC RX REV CODE- 250/636: Performed by: PHYSICIAN ASSISTANT

## 2018-11-20 RX ADMIN — INSULIN LISPRO 4 UNITS: 100 INJECTION, SOLUTION INTRAVENOUS; SUBCUTANEOUS at 13:24

## 2018-11-20 RX ADMIN — INFLUENZA VIRUS VACCINE 0.5 ML: 15; 15; 15; 15 SUSPENSION INTRAMUSCULAR at 16:02

## 2018-11-20 NOTE — PROGRESS NOTES
Progress Note Sean Herrera 
72 y.o. Admit Date: 11/18/2018 Active Problems: 
  Acute blood loss anemia (11/18/2018) POA: Unknown Acute renal failure (ARF) ([facility] [address] (11/20/2018) POA: Yes Hypotension arterial (11/20/2018) POA: Yes Metabolic acidosis (71/13/7223) POA: Clinically Undetermined Subjective:  
 
Patient feels good, tolerating IVF without any SOB, No Urinary complain. Pearl Doss A comprehensive review of systems was negative except for that written in the History of Present Illness. Objective:  
 
Visit Vitals /69 Pulse 69 Temp 98.4 °F (36.9 °C) Resp 14 Ht 5' 6\" (1.676 m) Wt 113.2 kg (249 lb 9 oz) SpO2 98% Breastfeeding? No  
BMI 40.28 kg/m² Intake/Output Summary (Last 24 hours) at 11/20/2018 1112 Last data filed at 11/20/2018 4650 Gross per 24 hour Intake 1440 ml Output 3150 ml Net -1710 ml  
 
 
Current Facility-Administered Medications Medication Dose Route Frequency Provider Last Rate Last Dose  insulin lispro (HUMALOG) injection   SubCUTAneous AC&HS Rain Helton MD   2 Units at 11/19/18 2145  influenza vaccine 2018-19 (6 mos+)(PF) (FLUARIX QUAD/FLULAVAL QUAD) injection 0.5 mL  0.5 mL IntraMUSCular PRIOR TO DISCHARGE Amrita Damon, PA-C      
 sodium chloride (NS) flush 5-10 mL  5-10 mL IntraVENous PRN Familia Isbell, DO      
 0.9% sodium chloride infusion 250 mL  250 mL IntraVENous PRN Karen Sahu A, DO      
 ondansetron (ZOFRAN) injection 4 mg  4 mg IntraVENous Q6H PRN Kirti Vasquez NP      
 glucose chewable tablet 16 g  4 Tab Oral PRN Kirti Vasquez NP      
 glucagon (GLUCAGEN) injection 1 mg  1 mg IntraMUSCular PRN Kirti Vasquez NP      
  dextrose (D50W) injection syrg 12.5-25 g  25-50 mL IntraVENous PRN Vangie Peaks, NP Physical Exam:  
 
Physical Exam:  
General:  Alert, cooperative, no distress, appears stated age. Neck: Supple, symmetrical, trachea midline, no adenopathy, thyroid: no enlargement/tenderness/nodules, no carotid bruit and no JVD. Lungs:   Clear to auscultation bilaterally. Heart:  Regular rate and rhythm, S1, S2 normal, no murmur, click, rub or gallop. Abdomen:   Soft, non-tender. Bowel sounds normal. No masses,  No organomegaly. Extremities: Extremities normal, atraumatic, no cyanosis or edema. Data Review: CBC w/Diff Recent Labs  
  11/20/18 
0823 11/20/18 0436 11/19/18 
1932 WBC 5.9 5.6 6.4  
RBC 3.07* 2.89* 2.89* HGB 9.1* 8.6* 8.7* HCT 27.5* 25.6* 25.8* MCV 89.6 88.6 89.3 MCH 29.6 29.8 30.1 MCHC 33.1 33.6 33.7 RDW 16.4* 16.3* 16.3* Recent Labs  
  11/20/18 
0823 11/20/18 
0436  11/19/18 
0905 11/18/18 
1850 BANDS  --   --   --   --  4 MONOS  --  10  --  9 4 EOS  --  1  --  0 0  
BASOS  --  1  --  0 0  
RDW 16.4* 16.3*   < > 15.7* 15.5*  
 < > = values in this interval not displayed. Comprehensive Metabolic Profile Recent Labs  
  11/20/18 
0436 11/19/18 
1310 11/19/18 
0467  145 146*  
K 4.4 4.3 4.6 * 115* 119* CO2 20* 16* 18* BUN 49* 65* 71* CREA 1.80* 2.78* 3.06* Recent Labs  
  11/20/18 
0436 11/19/18 
1310 11/19/18 
0905 11/18/18 
2220 11/18/18 
1850 CA 7.8* 8.0* 7.4* 6.5* 7.5* PHOS  --  4.2  --  5.2*  --   
ALB  --   --  2.2*  --  2.5* TP  --   --  4.6*  --  5.7* SGOT  --   --  9*  --  11* TBILI  --   --  1.2*  --  0.8 Results for Lizbet Robles (MRN [id number]) as of 11/20/2018 11:14 Ref. Range 11/18/2018 20:30 11/18/2018 20:30 Creatinine, urine Latest Ref Range: 30 - 125 mg/dL 148.00 (H) 148.00 (H) Protein, urine random Latest Ref Range: <11.9 mg/dL 42 (H) Protein/Creat. urine Ratio Latest Units:   0.3 Sodium,urine random Latest Ref Range: 20 - 110 MMOL/L  40 Osmolality,urine Latest Ref Range: 300 - 900 MOSM/kg H2O  345 Impression: Active Hospital Problems Diagnosis Date Noted  Acute renal failure (ARF) ([facility] [address] 11/20/2018  Hypotension arterial 11/20/2018  Metabolic acidosis 23/79/7476  Acute blood loss anemia 11/18/2018 Significant improvement of Renal function,Bicarb is improving Plan:  
 
Continue Current IVF, may transfer out from ICU to medical /step down floor Aminta Nielsen MD

## 2018-11-20 NOTE — PROGRESS NOTES
Problem: Pressure Injury - Risk of 
Goal: *Prevention of pressure injury Document Ernesto Scale and appropriate interventions in the flowsheet. Outcome: Progressing Towards Goal 
Pressure Injury Interventions: Activity Interventions: Pressure redistribution bed/mattress(bed type) Mobility Interventions: Assess need for specialty bed, HOB 30 degrees or less, Pressure redistribution bed/mattress (bed type), Turn and reposition approx. every two hours(pillow and wedges) Nutrition Interventions: Document food/fluid/supplement intake, Discuss nutritional consult with provider Friction and Shear Interventions: Apply protective barrier, creams and emollients, Foam dressings/transparent film/skin sealants, HOB 30 degrees or less, Lift sheet, Transferring/repositioning devices

## 2018-11-20 NOTE — DISCHARGE INSTRUCTIONS
[employer] Activation    Thank you for requesting access to [facility]. Please follow the instructions below to securely access and download your online medical record. [facility] allows you to send messages to your doctor, view your test results, renew your prescriptions, schedule appointments, and more. How Do I Sign Up? 1. In your internet browser, go to [email or web address]  2. Click on the First Time User? Click Here link in the Sign In box. You will be redirect to the New Member Sign Up page. 3. Enter your [facility]Craft Access Code exactly as it appears below. You will not need to use this code after youve completed the sign-up process. If you do not sign up before the expiration date, you must request a new code. [facility]Craft Access Code: Activation code not generated  Current [facility]Craft Status: Active (This is the date your GridCraft access code will )    4. Enter the last four digits of your Social Security Number (xxxx) and Date of Birth (mm/dd/yyyy) as indicated and click Submit. You will be taken to the next sign-up page. 5. Create a [facility]Craft ID. This will be your [facility]Craft login ID and cannot be changed, so think of one that is secure and easy to remember. 6. Create a [facility]Craft password. You can change your password at any time. 7. Enter your Password Reset Question and Answer. This can be used at a later time if you forget your password. 8. Enter your e-mail address. You will receive e-mail notification when new information is available in [address] 9. Click Sign Up. You can now view and download portions of your medical record. 10. Click the Download Summary menu link to download a portable copy of your medical information. Additional Information    If you have questions, please visit the Frequently Asked Questions section of the [facility]Craft website at [email or web address] Remember, [facility]Craft is NOT to be used for urgent needs. For medical emergencies, dial 911.     Patient armband removed and shredded  DISCHARGE SUMMARY from Nurse    PATIENT INSTRUCTIONS:    After general anesthesia or intravenous sedation, for 24 hours or while taking prescription Narcotics:  · Limit your activities  · Do not drive and operate hazardous machinery  · Do not make important personal or business decisions  · Do  not drink alcoholic beverages  · If you have not urinated within 8 hours after discharge, please contact your surgeon on call. Report the following to your surgeon:  · Excessive pain, swelling, redness or odor of or around the surgical area  · Temperature over 100.5  · Nausea and vomiting lasting longer than 4 hours or if unable to take medications  · Any signs of decreased circulation or nerve impairment to extremity: change in color, persistent  numbness, tingling, coldness or increase pain  · Any questions    What to do at Home:  Recommended activity: Activity as tolerated. If you experience any of the following symptoms bleeding, dizziness bruises worsen, please follow up with primary care doctor. *  Please give a list of your current medications to your Primary Care Provider. *  Please update this list whenever your medications are discontinued, doses are      changed, or new medications (including over-the-counter products) are added. *  Please carry medication information at all times in case of emergency situations. These are general instructions for a healthy lifestyle:    No smoking/ No tobacco products/ Avoid exposure to second hand smoke  Surgeon General's Warning:  Quitting smoking now greatly reduces serious risk to your health.     Obesity, smoking, and sedentary lifestyle greatly increases your risk for illness    A healthy diet, regular physical exercise & weight monitoring are important for maintaining a healthy lifestyle    You may be retaining fluid if you have a history of heart failure or if you experience any of the following symptoms:  Weight gain of 3 pounds or more overnight or 5 pounds in a week, increased swelling in our hands or feet or shortness of breath while lying flat in bed. Please call your doctor as soon as you notice any of these symptoms; do not wait until your next office visit. Recognize signs and symptoms of STROKE:    F-face looks uneven    A-arms unable to move or move unevenly    S-speech slurred or non-existent    T-time-call 911 as soon as signs and symptoms begin-DO NOT go       Back to bed or wait to see if you get better-TIME IS BRAIN. Warning Signs of HEART ATTACK     Call 911 if you have these symptoms:   Chest discomfort. Most heart attacks involve discomfort in the center of the chest that lasts more than a few minutes, or that goes away and comes back. It can feel like uncomfortable pressure, squeezing, fullness, or pain.  Discomfort in other areas of the upper body. Symptoms can include pain or discomfort in one or both arms, the back, neck, jaw, or stomach.  Shortness of breath with or without chest discomfort.  Other signs may include breaking out in a cold sweat, nausea, or lightheadedness. Don't wait more than five minutes to call 911 - MINUTES MATTER! Fast action can save your life. Calling 911 is almost always the fastest way to get lifesaving treatment. Emergency Medical Services staff can begin treatment when they arrive -- up to an hour sooner than if someone gets to the hospital by car. The discharge information has been reviewed with the patient. The patient verbalized understanding. Discharge medications reviewed with the patient and appropriate educational materials and side effects teaching were provided. ___________________________________________________________________________________________________________________________________  Atrial Fibrillation: Care Instructions  Your Care Instructions    Atrial fibrillation is an irregular and often fast heartbeat.  Treating this condition is important for several reasons. It can cause blood clots, which can travel from your heart to your brain and cause a stroke. If you have a fast heartbeat, you may feel lightheaded, dizzy, and weak. An irregular heartbeat can also increase your risk for heart failure. Atrial fibrillation is often the result of another heart condition, such as high blood pressure or coronary artery disease. Making changes to improve your heart condition will help you stay healthy and active. Follow-up care is a key part of your treatment and safety. Be sure to make and go to all appointments, and call your doctor if you are having problems. It's also a good idea to know your test results and keep a list of the medicines you take. How can you care for yourself at home? Medicines    · Take your medicines exactly as prescribed. Call your doctor if you think you are having a problem with your medicine. You will get more details on the specific medicines your doctor prescribes.     · If your doctor has given you a blood thinner to prevent a stroke, be sure you get instructions about how to take your medicine safely. Blood thinners can cause serious bleeding problems.     · Do not take any vitamins, over-the-counter drugs, or herbal products without talking to your doctor first.    Lifestyle changes    · Do not smoke. Smoking can increase your chance of a stroke and heart attack. If you need help quitting, talk to your doctor about stop-smoking programs and medicines. These can increase your chances of quitting for good.     · Eat a heart-healthy diet.     · Stay at a healthy weight. Lose weight if you need to.     · Limit alcohol to 2 drinks a day for men and 1 drink a day for women. Too much alcohol can cause health problems.     · Avoid colds and flu. Get a pneumococcal vaccine shot. If you have had one before, ask your doctor whether you need another dose. Get a flu shot every year.  If you must be around people with colds or flu, wash your hands often.   Activity    · If your doctor recommends it, get more exercise. Walking is a good choice. Bit by bit, increase the amount you walk every day. Try for at least 30 minutes on most days of the week. You also may want to swim, bike, or do other activities. Your doctor may suggest that you join a cardiac rehabilitation program so that you can have help increasing your physical activity safely.     · Start light exercise if your doctor says it is okay. Even a small amount will help you get stronger, have more energy, and manage stress. Walking is an easy way to get exercise. Start out by walking a little more than you did in the hospital. Gradually increase the amount you walk.     · When you exercise, watch for signs that your heart is working too hard. You are pushing too hard if you cannot talk while you are exercising. If you become short of breath or dizzy or have chest pain, sit down and rest immediately.     · Check your pulse regularly. Place two fingers on the artery at the palm side of your wrist, in line with your thumb. If your heartbeat seems uneven or fast, talk to your doctor. When should you call for help? Call 911 anytime you think you may need emergency care. For example, call if:    · You have symptoms of a heart attack. These may include:  ? Chest pain or pressure, or a strange feeling in the chest.  ? Sweating. ? Shortness of breath. ? Nausea or vomiting. ? Pain, pressure, or a strange feeling in the back, neck, jaw, or upper belly or in one or both shoulders or arms. ? Lightheadedness or sudden weakness. ? A fast or irregular heartbeat. After you call 911, the  may tell you to chew 1 adult-strength or 2 to 4 low-dose aspirin. Wait for an ambulance. Do not try to drive yourself.     · You have symptoms of a stroke. These may include:  ? Sudden numbness, tingling, weakness, or loss of movement in your face, arm, or leg, especially on only one side of your body.   ? Sudden vision changes. ? Sudden trouble speaking. ? Sudden confusion or trouble understanding simple statements. ? Sudden problems with walking or balance. ? A sudden, severe headache that is different from past headaches.     · You passed out (lost consciousness).    Call your doctor now or seek immediate medical care if:    · You have new or increased shortness of breath.     · You feel dizzy or lightheaded, or you feel like you may faint.     · Your heart rate becomes irregular.     · You can feel your heart flutter in your chest or skip heartbeats. Tell your doctor if these symptoms are new or worse.    Watch closely for changes in your health, and be sure to contact your doctor if you have any problems. Where can you learn more? Go to http://sam-solis.info/. Enter U020 in the search box to learn more about \"Atrial Fibrillation: Care Instructions. \"  Current as of: December 6, 2017  Content Version: 11.8  © 4501-2971 [employer]. Care instructions adapted under license by [employer] (which disclaims liability or warranty for this information). If you have questions about a medical condition or this instruction, always ask your healthcare professional. [place] [address] any warranty or liability for your use of this information. Learning About Blood Transfusions  What is a blood transfusion? Blood transfusion is a medical treatment to replace the blood or parts of blood that your body has lost. The blood goes through a tube from a bag to an intravenous (IV) catheter and into your vein. You may need a blood transfusion after losing blood from an injury, a major surgery, an illness that causes bleeding, or an illness that destroys blood cells. Transfusions are also used to give you the parts of blood--such as platelets, plasma, or substances that cause clotting--that your body needs to fight an illness or stop bleeding.   How is a blood transfusion done? Before you receive a blood transfusion, your blood is tested to find out what your blood type is. Blood or blood parts that are a match with your blood type are ordered by your doctor. Blood is typed as A, B, AB, or O. It is also typed as Rh-positive or Rh-negative. Your blood is also screened to look for antibodies that might react with the blood that is given to you. The blood you are getting is checked and rechecked to make sure that it's the right type for you. A sample of your blood is mixed with a sample of the blood you will receive to check for problems. Before actually giving you the transfusion, a doctor and nurses will look at the label on the package of blood and compare it to your hospital ID bracelet and medical records. The transfusion begins only when all agree that this is the correct blood and that you are the correct person to receive it. To receive the transfusion, you will have an intravenous (IV) catheter inserted into a vein. A tube connects the catheter to the bag containing the blood, which is placed higher than your body. The blood then flows slowly into your vein. A doctor or nurse will check you several times during the transfusion to watch for a reaction or other problems. What are the possible risks? Blood transfusions have many benefits and are often life-saving. But they also have a few risks. Possible risks include:  · Your body's reaction to receiving new blood. This may include:  ? Fever. ? Allergic reactions. ? Breathing problems. · An infection from the blood. This risk is small because of the strict rules placed on handling and storing blood. Getting a viral infection, such as HIV or hepatitis B or C, through blood transfusions has become very rare. The U.S. Food and Drug Administration (FDA) enforces strict guidelines on the collection, testing, storage, and use of blood. · Getting the wrong blood type by accident.  Severe reactions, which can be life-threatening, are very rare. What can you expect after a blood transfusion? Here are some things you can do at home to help prevent infection at the transfusion site:  · Wash the area daily with warm, soapy water, and pat it dry. Don't use hydrogen peroxide or alcohol, which can slow healing. You may cover the area with a gauze bandage if it weeps or rubs against clothing. Change the bandage every day. · Keep the area clean and dry. When should you call for help? Call 911 anytime you think you may need emergency care. For example, call if:  · You have severe trouble breathing. Call your doctor now or seek immediate medical care if:  · You have a fever. · You feel weaker or more tired than usual.  · You have a yellow tint to your skin or the whites of your eyes. Watch closely for changes in your health, and be sure to contact your doctor if you have any problems. Follow-up care is a key part of your treatment and safety. Be sure to make and go to all appointments, and call your doctor if you are having problems. It's also a good idea to know your test results and keep a list of the medicines you take. Where can you learn more? Go to http://sam-solis.info/. Enter V588 in the search box to learn more about \"Learning About Blood Transfusions. \"  Current as of: May 7, 2018  Content Version: 11.8  © 1783-7780 [employer]. Care instructions adapted under license by [employer] (which disclaims liability or warranty for this information). If you have questions about a medical condition or this instruction, always ask your healthcare professional. [place] [address] any warranty or liability for your use of this information. Low Blood Pressure: Care Instructions  Your Care Instructions    Blood pressure is a measurement of the force of the blood against the walls of the blood vessels during and after each beat of the heart.  Low blood pressure is also called hypotension. It means that your blood pressure is much lower than normal. Some people, especially young, slim women, may have slightly low blood pressure without symptoms. But in many people, low blood pressure can cause symptoms such as feeling dizzy or lightheaded. When your blood pressure is too low, your heart, brain, and other organs do not get enough blood. Low blood pressure can be caused by many things, including heart problems and some medicines. Diabetes that is not under control can cause your blood pressure to drop. And so can a severe allergic reaction or infection. Another cause is dehydration, which is when your body loses too much fluid. Treatment for low blood pressure depends on the cause. Follow-up care is a key part of your treatment and safety. Be sure to make and go to all appointments, and call your doctor if you are having problems. It's also a good idea to know your test results and keep a list of the medicines you take. How can you care for yourself at home? · Drink plenty of fluids, enough so that your urine is light yellow or clear like water. If you have kidney, heart, or liver disease and have to limit fluids, talk with your doctor before you increase the amount of fluids you drink. · Be safe with medicines. Call your doctor if you think you are having a problem with your medicine. You will get more details on the specific medicines your doctor prescribes. · Stand up or get out of bed very slowly to allow your body to adjust.  · Get plenty of rest.  · Do not smoke. Smoking increases your risk of heart attack. If you need help quitting, talk to your doctor about stop-smoking programs and medicines. These can increase your chances of quitting for good. · Limit alcohol to 2 drinks a day for men and 1 drink a day for women. Alcohol may interfere with your medicine.  In addition, alcohol can make your low blood pressure worse by causing your body to lose water.  When should you call for help? Call 911 anytime you think you may need emergency care. For example, call if:    · You passed out (lost consciousness).    Call your doctor now or seek immediate medical care if:    · You are dizzy or lightheaded, or you feel like you may faint.    Watch closely for changes in your health, and be sure to contact your doctor if you have any problems. Where can you learn more? Go to http://sam-solis.info/. Enter C304 in the search box to learn more about \"Low Blood Pressure: Care Instructions. \"  Current as of: December 6, 2017  Content Version: 11.8  © 5757-7445 [employer]. Care instructions adapted under license by [employer] (which disclaims liability or warranty for this information). If you have questions about a medical condition or this instruction, always ask your healthcare professional. [place] [address] any warranty or liability for your use of this information. Acute Kidney Injury: Care Instructions  Your Care Instructions    Acute kidney injury (BULL) is a sudden decrease in kidney function. This can happen over a period of hours, days or, in some cases, weeks. BULL used to be called acute renal failure, but kidney failure doesn't always happen with BULL. Common causes of BULL are dehydration, blood loss, and medicines. When BULL happens, the kidneys have trouble removing waste and excess fluids from the body. The waste and fluids build up and become harmful. Kidney function may return to normal if the cause of BULL is treated quickly. Your chance of a full recovery depends on what caused the problem, how quickly the cause was treated, and what other medical problems you have. A machine may be used to help your kidneys remove waste and fluids for a short period of time. This is called dialysis. Follow-up care is a key part of your treatment and safety.  Be sure to make and go to all appointments, and call your doctor if you are having problems. It's also a good idea to know your test results and keep a list of the medicines you take. How can you care for yourself at home? · Talk to your doctor about how much fluid you should drink. · Eat a balanced diet. Talk to your doctor or a dietitian about what type of diet may be best for you. You may need to limit sodium, potassium, and phosphorus. · If you need dialysis, follow the instructions and schedule for dialysis that your doctor gives you. · Do not smoke. Smoking can make your condition worse. If you need help quitting, talk to your doctor about stop-smoking programs and medicines. These can increase your chances of quitting for good. · Do not drink alcohol. · Review all of your medicines with your doctor. Do not take any medicines, including nonsteroidal anti-inflammatory drugs (NSAIDs) such as ibuprofen (Advil, Motrin) or naproxen (Aleve), unless your doctor says it is safe for you to do so. · Make sure that anyone treating you for any health problem knows that you have had BULL. When should you call for help? Call 911 anytime you think you may need emergency care. For example, call if:    · You passed out (lost consciousness).    Call your doctor now or seek immediate medical care if:    · You have new or worse nausea and vomiting.     · You have much less urine than normal, or you have no urine.     · You are feeling confused or cannot think clearly.     · You have new or more blood in your urine.     · You have new swelling.     · You are dizzy or lightheaded, or feel like you may faint.    Watch closely for changes in your health, and be sure to contact your doctor if:    · You do not get better as expected. Where can you learn more? Go to http://sam-solis.info/. Enter H830 in the search box to learn more about \"Acute Kidney Injury: Care Instructions. \"  Current as of: March 15, 2018  Content Version: 11.8  © 1331-5747 [employer]. Care instructions adapted under license by [employer] (which disclaims liability or warranty for this information). If you have questions about a medical condition or this instruction, always ask your healthcare professional. [address] any warranty or liability for your use of this information.

## 2018-11-20 NOTE — PROGRESS NOTES
Problem: Falls - Risk of 
Goal: *Absence of Falls Document Mariah Hill Fall Risk and appropriate interventions in the flowsheet. Outcome: Progressing Towards Goal 
Fall Risk Interventions: 
  
 
  
 
Medication Interventions: Assess postural VS orthostatic hypotension, Evaluate medications/consider consulting pharmacy, Patient to call before getting OOB, Teach patient to arise slowly History of Falls Interventions: Bed/chair exit alarm, Evaluate medications/consider consulting pharmacy, Door open when patient unattended, Investigate reason for fall, Room close to nurse's station

## 2018-11-20 NOTE — PROGRESS NOTES
Problem: Falls - Risk of 
Goal: *Absence of Falls Document Carol Ann Craig Fall Risk and appropriate interventions in the flowsheet. Outcome: Progressing Towards Goal 
Fall Risk Interventions: 
Mobility Interventions: Bed/chair exit alarm Medication Interventions: Bed/chair exit alarm, Patient to call before getting OOB Elimination Interventions: Bed/chair exit alarm, Call light in reach History of Falls Interventions: Bed/chair exit alarm, Room close to nurse's station, Door open when patient unattended Problem: Pressure Injury - Risk of 
Goal: *Prevention of pressure injury Document Ernesto Scale and appropriate interventions in the flowsheet. Outcome: Progressing Towards Goal 
Pressure Injury Interventions: Activity Interventions: Pressure redistribution bed/mattress(bed type) Mobility Interventions: Pressure redistribution bed/mattress (bed type) Nutrition Interventions: Document food/fluid/supplement intake Friction and Shear Interventions: Foam dressings/transparent film/skin sealants

## 2018-11-20 NOTE — ROUTINE PROCESS
TRANSFER - OUT REPORT: 
 
Verbal report given to [facility][1]) on Micki Paulson  being transferred to [address] [place] [address](unit) for routine progression of care Report consisted of patients Situation, Background, Assessment and  
Recommendations(SBAR). Information from the following report(s) SBAR, Kardex, Intake/Output, Recent Results, Med Rec Status and Cardiac Rhythm . was reviewed with the receiving nurse. Lines:  
Peripheral IV 11/18/18 Right Antecubital (Active) Site Assessment Clean, dry, & intact 11/20/2018  5:00 AM  
Phlebitis Assessment 0 11/20/2018  5:00 AM  
Infiltration Assessment 0 11/20/2018  5:00 AM  
Dressing Status Clean, dry, & intact 11/20/2018  5:00 AM  
Dressing Type Transparent 11/20/2018  5:00 AM  
Hub Color/Line Status Pink;Capped 11/20/2018  5:00 AM  
Action Taken Open ports on tubing capped 11/20/2018  5:00 AM  
Alcohol Cap Used Yes 11/20/2018  5:00 AM  
   
Peripheral IV 11/18/18 Left Antecubital (Active) Site Assessment Clean, dry, & intact 11/20/2018  5:00 AM  
Phlebitis Assessment 0 11/20/2018  5:00 AM  
Infiltration Assessment 0 11/20/2018  5:00 AM  
Dressing Status Clean, dry, & intact 11/20/2018  5:00 AM  
Dressing Type Transparent 11/20/2018  5:00 AM  
Hub Color/Line Status Green; Infusing 11/20/2018  5:00 AM  
Action Taken Open ports on tubing capped 11/20/2018  5:00 AM  
Alcohol Cap Used Yes 11/20/2018  5:00 AM  
  
 
Opportunity for questions and clarification was provided. Patient transported with: 
 Tech (transportor) 
chart

## 2018-11-20 NOTE — ROUTINE PROCESS
Bedside and Verbal shift change report given to [address] [place] [address] (oncoming nurse) by Jose Francisco Michaels RN 
 (offgoing nurse). Report included the following information Kardex, ED Summary, Procedure Summary, Intake/Output, MAR and Recent Results.

## 2018-11-20 NOTE — PROGRESS NOTES
Surgery Much improved last 24 hours Transferring from ICU Hematoma/contusions unchanged Hct 25% HR 70's 's 
resoling hematoma, normaovolemaia Doing well post transfusion

## 2018-11-20 NOTE — PROGRESS NOTES
Pt discharged home with her family. Pt states she know side effects of all her home meds. No new meds prescribed.

## 2018-11-21 NOTE — H&P
[facility] HISTORY AND PHYSICAL Daniel Ree MR#: [id number] : 1945 ACCOUNT #: [de-identified] ADMIT DATE: 2018 HISTORY OF PRESENT ILLNESS:  The patient is a 46-year-old woman who is about 5 days out from a right-sided breast biopsy. This turns out to be negative for cancer and she was doing reasonably well, when this evening she presented to the emergency room at [facility] hypotensive with a very large hematoma contusion down her breast, her chest and her right flank. She was resuscitated by the ER staff with normal saline; however, required several units of O negative, blood followed by several more units of typed red blood cells as well as 2 units of platelets. She was transferred to the ICU for ongoing resuscitation. Her blood pressure remained low, partially in response to her beta blockade and her inability to elevate her heart rate. She had taken Eliquis several days before this happened and this hematoma/contusion was thought to be in response to that. She has been eating relatively poorly. PAST MEDICAL HISTORY:  Significant for atrial fibrillation, coronary artery disease, diabetes and hypertension. PAST SURGICAL HISTORY:  Positive for this biopsy, several coronary stents a gastric bypass, hysterectomy and a cholecystectomy. MEDICATIONS:  Prior to admission included:  Zofran 4 mg p.o. p.r.n., Percocet 5/325 one q.6 p.r.n., Glucophage 500 mg twice daily, Zestoretic 20/12.5 mg daily, Zocor 40 mg daily, Lopressor 50 mg daily, Eliquis 5 mg b.i.d. and Lovenox, which she is now off of that, it was a Lovenox bridge during her surgery. ALLERGIES:  NONE. SOCIAL HISTORY:  She is a former smoker. Occasional drinker. Lives at home with family. FAMILY HISTORY:  Positive for lung cancer. REVIEW OF SYSTEMS:  Positive for constitutional complaints of hypotension and dizziness. Negative for HEENT complaints.   Negative for pulmonary complaints. Negative for chest pain or cardiac complaints. Negative for abdominal complaints. Positive for chest complaints in that she had a large contusion and a soft hematoma in her breast.  Negative for skin complaints. Negative for endocrine complaints. Negative for orthopedic issues. Negative for psychiatric issues. Negative for neurologic issues. PHYSICAL EXAMINATION: 
VITAL SIGNS:  Her blood pressure on admission was around 65 systolic and varied over her course of time in in the ER up to as high as  systolic. GENERAL:  She is awake, alert and oriented x3, in no apparent distress. HEAD AND NECK:  Normocephalic, atraumatic. Her pupils are equal.  Sclerae are anicteric. Nose and throat are clear. CHEST:  Clear bilaterally, with a large contusion in her right breast, which is soft. Her contusion extends around her right chest and down into her right flank. EXTREMITIES:  Warm and dry. NEUROLOGIC:  She is intact. LABORATORY DATA:  On admission showed a hematocrit of 18 and a hemoglobin of 6. Her electrolytes showed a metabolic acidosis with renal failure and a creatinine as high as 5.9. She was resuscitated partially in the ER and then transferred to the ICU for ongoing blood products and resuscitation. IMPRESSION:   
1. Postoperative bleeding due to Eliquis. 2.  Hypovolemic shock. 3.  Diabetes. PLAN:  ICU admission with ongoing resuscitation and blood products. Beryle Balboa, MD JLR/JADA 
D: 11/21/2018 07:40    
T: 11/21/2018 09:04 
JOB #: [id number]

## 2018-11-23 NOTE — DISCHARGE SUMMARY
[address] Dulce Queen  MR#: [id number]  : 1945  ACCOUNT #: [de-identified]   ADMIT DATE: 2018  DISCHARGE DATE: 2018    DIAGNOSIS:  Breast and chest hematoma. HOSPITAL COURSE:  The patient is a 74-year-old woman who is about 5 days out from a breast biopsy that was negative for pathology. She presented several days after beginning her Eliquis with a very large hematoma in her right breast trailing down her chest wall and into her flank. She presented to the emergency room hypotensive, but not tachycardic due to beta blockade. She was resuscitated with 4 liters of fluid and 4 units of packed red cells with 2 units of platelets over the next 24 hours. She was transferred from the emergency room to the ICU where she was stable the entire time. It should be noted as well that she had an acute kidney injury, which improved rapidly with her resuscitation and red cells. By hospital day #1, she was beginning to tolerate a diet and was much improved after her transfusions. She was never returned to the operating room as her hematoma was subcutaneous and not tense. On the , she was transferred out to the floor, tolerated a regular diet, walked around the floor and was ready for discharge. DISPOSITION:  She was sent home without pain medication as she already had some with her scheduled followup remaining intact.       MD BOAZ Campoverde/DO  D: 2018 15:34     T: 2018 15:42  JOB #: [id number]

## 2018-11-24 LAB
BACTERIA SPEC CULT: NORMAL
BACTERIA SPEC CULT: NORMAL
SERVICE CMNT-IMP: NORMAL
SERVICE CMNT-IMP: NORMAL

## 2018-12-02 ENCOUNTER — APPOINTMENT (OUTPATIENT)
Dept: CT IMAGING | Age: 73
DRG: 908 | End: 2018-12-02
Attending: EMERGENCY MEDICINE
Payer: MEDICARE

## 2018-12-02 ENCOUNTER — HOSPITAL ENCOUNTER (INPATIENT)
Age: 73
LOS: 2 days | Discharge: HOME HEALTH | DRG: 908 | End: 2018-12-05
Attending: EMERGENCY MEDICINE | Admitting: SURGERY
Payer: MEDICARE

## 2018-12-02 DIAGNOSIS — A41.9 SEPSIS, DUE TO UNSPECIFIED ORGANISM: Primary | ICD-10-CM

## 2018-12-02 DIAGNOSIS — N61.1 BREAST ABSCESS: ICD-10-CM

## 2018-12-02 DIAGNOSIS — L02.91 ABSCESS: ICD-10-CM

## 2018-12-02 LAB
ALBUMIN SERPL-MCNC: 2.7 G/DL (ref 3.5–5)
ALBUMIN/GLOB SERPL: 0.7 {RATIO} (ref 1.1–2.2)
ALP SERPL-CCNC: 103 U/L (ref 45–117)
ALT SERPL-CCNC: 15 U/L (ref 12–78)
ANION GAP SERPL CALC-SCNC: 12 MMOL/L (ref 5–15)
APPEARANCE UR: ABNORMAL
AST SERPL-CCNC: 15 U/L (ref 15–37)
BACTERIA URNS QL MICRO: ABNORMAL /HPF
BASOPHILS # BLD: 0 K/UL (ref 0–0.1)
BASOPHILS NFR BLD: 1 % (ref 0–1)
BILIRUB SERPL-MCNC: 2.9 MG/DL (ref 0.2–1)
BILIRUB UR QL: NEGATIVE
BUN SERPL-MCNC: 23 MG/DL (ref 6–20)
BUN/CREAT SERPL: 17 (ref 12–20)
CALCIUM SERPL-MCNC: 8.7 MG/DL (ref 8.5–10.1)
CHLORIDE SERPL-SCNC: 107 MMOL/L (ref 97–108)
CO2 SERPL-SCNC: 18 MMOL/L (ref 21–32)
COLOR UR: ABNORMAL
CREAT SERPL-MCNC: 1.32 MG/DL (ref 0.55–1.02)
DIFFERENTIAL METHOD BLD: ABNORMAL
EOSINOPHIL # BLD: 0 K/UL (ref 0–0.4)
EOSINOPHIL NFR BLD: 0 % (ref 0–7)
EPITH CASTS URNS QL MICRO: ABNORMAL /LPF
ERYTHROCYTE [DISTWIDTH] IN BLOOD BY AUTOMATED COUNT: 16.4 % (ref 11.5–14.5)
GLOBULIN SER CALC-MCNC: 4.1 G/DL (ref 2–4)
GLUCOSE SERPL-MCNC: 184 MG/DL (ref 65–100)
GLUCOSE UR STRIP.AUTO-MCNC: NEGATIVE MG/DL
HCT VFR BLD AUTO: 33.9 % (ref 35–47)
HGB BLD-MCNC: 11 G/DL (ref 11.5–16)
HGB UR QL STRIP: NEGATIVE
IMM GRANULOCYTES # BLD: 0 K/UL (ref 0–0.04)
IMM GRANULOCYTES NFR BLD AUTO: 0 % (ref 0–0.5)
KETONES UR QL STRIP.AUTO: ABNORMAL MG/DL
LACTATE SERPL-SCNC: 1.8 MMOL/L (ref 0.4–2)
LEUKOCYTE ESTERASE UR QL STRIP.AUTO: ABNORMAL
LYMPHOCYTES # BLD: 0.3 K/UL (ref 0.8–3.5)
LYMPHOCYTES NFR BLD: 8 % (ref 12–49)
MCH RBC QN AUTO: 30.8 PG (ref 26–34)
MCHC RBC AUTO-ENTMCNC: 32.4 G/DL (ref 30–36.5)
MCV RBC AUTO: 95 FL (ref 80–99)
MONOCYTES # BLD: 0.3 K/UL (ref 0–1)
MONOCYTES NFR BLD: 6 % (ref 5–13)
NEUTS SEG # BLD: 3.7 K/UL (ref 1.8–8)
NEUTS SEG NFR BLD: 85 % (ref 32–75)
NITRITE UR QL STRIP.AUTO: NEGATIVE
NRBC # BLD: 0 K/UL (ref 0–0.01)
NRBC BLD-RTO: 0 PER 100 WBC
PH UR STRIP: 5.5 [PH] (ref 5–8)
PLATELET # BLD AUTO: 220 K/UL (ref 150–400)
PMV BLD AUTO: 10.2 FL (ref 8.9–12.9)
POTASSIUM SERPL-SCNC: 3.7 MMOL/L (ref 3.5–5.1)
PROT SERPL-MCNC: 6.8 G/DL (ref 6.4–8.2)
PROT UR STRIP-MCNC: 30 MG/DL
RBC # BLD AUTO: 3.57 M/UL (ref 3.8–5.2)
RBC #/AREA URNS HPF: ABNORMAL /HPF (ref 0–5)
RBC MORPH BLD: ABNORMAL
SODIUM SERPL-SCNC: 137 MMOL/L (ref 136–145)
SP GR UR REFRACTOMETRY: >1.03 (ref 1–1.03)
UROBILINOGEN UR QL STRIP.AUTO: 1 EU/DL (ref 0.2–1)
WBC # BLD AUTO: 4.3 K/UL (ref 3.6–11)
WBC URNS QL MICRO: ABNORMAL /HPF (ref 0–4)

## 2018-12-02 PROCEDURE — 87086 URINE CULTURE/COLONY COUNT: CPT

## 2018-12-02 PROCEDURE — 85025 COMPLETE CBC W/AUTO DIFF WBC: CPT

## 2018-12-02 PROCEDURE — 99222 1ST HOSP IP/OBS MODERATE 55: CPT | Performed by: SURGERY

## 2018-12-02 PROCEDURE — 96366 THER/PROPH/DIAG IV INF ADDON: CPT

## 2018-12-02 PROCEDURE — 71260 CT THORAX DX C+: CPT

## 2018-12-02 PROCEDURE — 80053 COMPREHEN METABOLIC PANEL: CPT

## 2018-12-02 PROCEDURE — 99285 EMERGENCY DEPT VISIT HI MDM: CPT

## 2018-12-02 PROCEDURE — 36415 COLL VENOUS BLD VENIPUNCTURE: CPT

## 2018-12-02 PROCEDURE — 83605 ASSAY OF LACTIC ACID: CPT

## 2018-12-02 PROCEDURE — 96375 TX/PRO/DX INJ NEW DRUG ADDON: CPT

## 2018-12-02 PROCEDURE — 87040 BLOOD CULTURE FOR BACTERIA: CPT

## 2018-12-02 PROCEDURE — 81001 URINALYSIS AUTO W/SCOPE: CPT

## 2018-12-02 PROCEDURE — 74011250636 HC RX REV CODE- 250/636: Performed by: EMERGENCY MEDICINE

## 2018-12-02 PROCEDURE — 74011636320 HC RX REV CODE- 636/320: Performed by: EMERGENCY MEDICINE

## 2018-12-02 PROCEDURE — 96365 THER/PROPH/DIAG IV INF INIT: CPT

## 2018-12-02 RX ORDER — SODIUM CHLORIDE 0.9 % (FLUSH) 0.9 %
5-10 SYRINGE (ML) INJECTION AS NEEDED
Status: DISCONTINUED | OUTPATIENT
Start: 2018-12-02 | End: 2018-12-03

## 2018-12-02 RX ORDER — SODIUM CHLORIDE, SODIUM LACTATE, POTASSIUM CHLORIDE, CALCIUM CHLORIDE 600; 310; 30; 20 MG/100ML; MG/100ML; MG/100ML; MG/100ML
1000 INJECTION, SOLUTION INTRAVENOUS CONTINUOUS
Status: DISCONTINUED | OUTPATIENT
Start: 2018-12-02 | End: 2018-12-03

## 2018-12-02 RX ORDER — VANCOMYCIN 2 GRAM/500 ML IN 0.9 % SODIUM CHLORIDE INTRAVENOUS
2000
Status: COMPLETED | OUTPATIENT
Start: 2018-12-02 | End: 2018-12-03

## 2018-12-02 RX ORDER — CEFAZOLIN SODIUM/WATER 2 G/20 ML
2 SYRINGE (ML) INTRAVENOUS ONCE
Status: COMPLETED | OUTPATIENT
Start: 2018-12-02 | End: 2018-12-02

## 2018-12-02 RX ORDER — SODIUM CHLORIDE 9 MG/ML
50 INJECTION, SOLUTION INTRAVENOUS
Status: COMPLETED | OUTPATIENT
Start: 2018-12-02 | End: 2018-12-03

## 2018-12-02 RX ORDER — SODIUM CHLORIDE 0.9 % (FLUSH) 0.9 %
10 SYRINGE (ML) INJECTION
Status: COMPLETED | OUTPATIENT
Start: 2018-12-02 | End: 2018-12-02

## 2018-12-02 RX ADMIN — Medication 10 ML: at 22:05

## 2018-12-02 RX ADMIN — VANCOMYCIN HYDROCHLORIDE 2000 MG: 10 INJECTION, POWDER, LYOPHILIZED, FOR SOLUTION INTRAVENOUS at 21:17

## 2018-12-02 RX ADMIN — Medication 2 G: at 21:12

## 2018-12-02 RX ADMIN — SODIUM CHLORIDE, SODIUM LACTATE, POTASSIUM CHLORIDE, AND CALCIUM CHLORIDE 1000 ML: 600; 310; 30; 20 INJECTION, SOLUTION INTRAVENOUS at 21:02

## 2018-12-02 RX ADMIN — IOPAMIDOL 100 ML: 755 INJECTION, SOLUTION INTRAVENOUS at 22:05

## 2018-12-02 RX ADMIN — SODIUM CHLORIDE 50 ML/HR: 900 INJECTION, SOLUTION INTRAVENOUS at 22:05

## 2018-12-02 RX ADMIN — SODIUM CHLORIDE, SODIUM LACTATE, POTASSIUM CHLORIDE, AND CALCIUM CHLORIDE 1000 ML: 600; 310; 30; 20 INJECTION, SOLUTION INTRAVENOUS at 21:05

## 2018-12-03 ENCOUNTER — ANESTHESIA EVENT (OUTPATIENT)
Dept: SURGERY | Age: 73
DRG: 908 | End: 2018-12-03
Payer: MEDICARE

## 2018-12-03 ENCOUNTER — ANESTHESIA (OUTPATIENT)
Dept: SURGERY | Age: 73
DRG: 908 | End: 2018-12-03
Payer: MEDICARE

## 2018-12-03 LAB
BASOPHILS # BLD: 0 K/UL (ref 0–0.1)
BASOPHILS NFR BLD: 0 % (ref 0–1)
DIFFERENTIAL METHOD BLD: ABNORMAL
EOSINOPHIL # BLD: 0 K/UL (ref 0–0.4)
EOSINOPHIL NFR BLD: 0 % (ref 0–7)
ERYTHROCYTE [DISTWIDTH] IN BLOOD BY AUTOMATED COUNT: 16.2 % (ref 11.5–14.5)
GLUCOSE BLD STRIP.AUTO-MCNC: 129 MG/DL (ref 65–100)
GLUCOSE BLD STRIP.AUTO-MCNC: 136 MG/DL (ref 65–100)
GLUCOSE BLD STRIP.AUTO-MCNC: 136 MG/DL (ref 65–100)
GLUCOSE BLD STRIP.AUTO-MCNC: 170 MG/DL (ref 65–100)
GLUCOSE BLD STRIP.AUTO-MCNC: 176 MG/DL (ref 65–100)
HCT VFR BLD AUTO: 28.3 % (ref 35–47)
HGB BLD-MCNC: 9.2 G/DL (ref 11.5–16)
IMM GRANULOCYTES # BLD: 0 K/UL (ref 0–0.04)
IMM GRANULOCYTES NFR BLD AUTO: 1 % (ref 0–0.5)
LYMPHOCYTES # BLD: 0.4 K/UL (ref 0.8–3.5)
LYMPHOCYTES NFR BLD: 11 % (ref 12–49)
MCH RBC QN AUTO: 30.7 PG (ref 26–34)
MCHC RBC AUTO-ENTMCNC: 32.5 G/DL (ref 30–36.5)
MCV RBC AUTO: 94.3 FL (ref 80–99)
MONOCYTES # BLD: 0.3 K/UL (ref 0–1)
MONOCYTES NFR BLD: 7 % (ref 5–13)
NEUTS SEG # BLD: 2.9 K/UL (ref 1.8–8)
NEUTS SEG NFR BLD: 80 % (ref 32–75)
NRBC # BLD: 0 K/UL (ref 0–0.01)
NRBC BLD-RTO: 0 PER 100 WBC
PLATELET # BLD AUTO: 187 K/UL (ref 150–400)
PMV BLD AUTO: 10.3 FL (ref 8.9–12.9)
RBC # BLD AUTO: 3 M/UL (ref 3.8–5.2)
SERVICE CMNT-IMP: ABNORMAL
WBC # BLD AUTO: 3.6 K/UL (ref 3.6–11)

## 2018-12-03 PROCEDURE — 36415 COLL VENOUS BLD VENIPUNCTURE: CPT

## 2018-12-03 PROCEDURE — 74011636637 HC RX REV CODE- 636/637: Performed by: SURGERY

## 2018-12-03 PROCEDURE — 77030019952 HC CANSTR VAC ASST KCON -B

## 2018-12-03 PROCEDURE — 74011250636 HC RX REV CODE- 250/636: Performed by: SURGERY

## 2018-12-03 PROCEDURE — 87075 CULTR BACTERIA EXCEPT BLOOD: CPT

## 2018-12-03 PROCEDURE — 87077 CULTURE AEROBIC IDENTIFY: CPT

## 2018-12-03 PROCEDURE — 77030026438 HC STYL ET INTUB CARD -A: Performed by: NURSE ANESTHETIST, CERTIFIED REGISTERED

## 2018-12-03 PROCEDURE — 87205 SMEAR GRAM STAIN: CPT

## 2018-12-03 PROCEDURE — 74011250636 HC RX REV CODE- 250/636

## 2018-12-03 PROCEDURE — 77030018786 HC NDL GD F/USND BARD -B

## 2018-12-03 PROCEDURE — 88304 TISSUE EXAM BY PATHOLOGIST: CPT

## 2018-12-03 PROCEDURE — 76210000006 HC OR PH I REC 0.5 TO 1 HR: Performed by: SURGERY

## 2018-12-03 PROCEDURE — 77030025162 HC DRSG VAC ASST 1 KCON -B

## 2018-12-03 PROCEDURE — 65270000029 HC RM PRIVATE

## 2018-12-03 PROCEDURE — 76937 US GUIDE VASCULAR ACCESS: CPT

## 2018-12-03 PROCEDURE — C1751 CATH, INF, PER/CENT/MIDLINE: HCPCS

## 2018-12-03 PROCEDURE — 74011000272 HC RX REV CODE- 272: Performed by: SURGERY

## 2018-12-03 PROCEDURE — 77030018719 HC DRSG PTCH ANTIMIC J&J -A

## 2018-12-03 PROCEDURE — 74011000250 HC RX REV CODE- 250: Performed by: SURGERY

## 2018-12-03 PROCEDURE — 87185 SC STD ENZYME DETCJ PER NZM: CPT

## 2018-12-03 PROCEDURE — 82962 GLUCOSE BLOOD TEST: CPT

## 2018-12-03 PROCEDURE — 0H9T0ZZ DRAINAGE OF RIGHT BREAST, OPEN APPROACH: ICD-10-PCS | Performed by: SURGERY

## 2018-12-03 PROCEDURE — 77030019934 HC DRSG VAC ASST KCON -B

## 2018-12-03 PROCEDURE — 77030011640 HC PAD GRND REM COVD -A: Performed by: SURGERY

## 2018-12-03 PROCEDURE — 76010000138 HC OR TIME 0.5 TO 1 HR: Performed by: SURGERY

## 2018-12-03 PROCEDURE — 19020 MASTOTOMY EXPL DRG ABSC DP: CPT | Performed by: SURGERY

## 2018-12-03 PROCEDURE — 77030018836 HC SOL IRR NACL ICUM -A: Performed by: SURGERY

## 2018-12-03 PROCEDURE — 77030031139 HC SUT VCRL2 J&J -A: Performed by: SURGERY

## 2018-12-03 PROCEDURE — 76060000032 HC ANESTHESIA 0.5 TO 1 HR: Performed by: SURGERY

## 2018-12-03 PROCEDURE — 77030019908 HC STETH ESOPH SIMS -A: Performed by: NURSE ANESTHETIST, CERTIFIED REGISTERED

## 2018-12-03 PROCEDURE — 77030019607 HC DSG BURN S&N -A

## 2018-12-03 PROCEDURE — 74011000258 HC RX REV CODE- 258: Performed by: SURGERY

## 2018-12-03 PROCEDURE — 97597 DBRDMT OPN WND 1ST 20 CM/<: CPT | Performed by: SURGERY

## 2018-12-03 PROCEDURE — 85025 COMPLETE CBC W/AUTO DIFF WBC: CPT

## 2018-12-03 PROCEDURE — 77030008684 HC TU ET CUF COVD -B: Performed by: NURSE ANESTHETIST, CERTIFIED REGISTERED

## 2018-12-03 PROCEDURE — 74011000250 HC RX REV CODE- 250

## 2018-12-03 PROCEDURE — 96366 THER/PROPH/DIAG IV INF ADDON: CPT

## 2018-12-03 PROCEDURE — 87186 SC STD MICRODIL/AGAR DIL: CPT

## 2018-12-03 RX ORDER — HYDROCODONE BITARTRATE AND ACETAMINOPHEN 5; 325 MG/1; MG/1
1 TABLET ORAL
Status: DISCONTINUED | OUTPATIENT
Start: 2018-12-03 | End: 2018-12-05 | Stop reason: HOSPADM

## 2018-12-03 RX ORDER — MAGNESIUM SULFATE 100 %
4 CRYSTALS MISCELLANEOUS AS NEEDED
Status: DISCONTINUED | OUTPATIENT
Start: 2018-12-03 | End: 2018-12-05 | Stop reason: HOSPADM

## 2018-12-03 RX ORDER — SODIUM CHLORIDE 0.9 % (FLUSH) 0.9 %
10 SYRINGE (ML) INJECTION AS NEEDED
Status: DISCONTINUED | OUTPATIENT
Start: 2018-12-03 | End: 2018-12-05 | Stop reason: HOSPADM

## 2018-12-03 RX ORDER — SODIUM CHLORIDE, SODIUM LACTATE, POTASSIUM CHLORIDE, CALCIUM CHLORIDE 600; 310; 30; 20 MG/100ML; MG/100ML; MG/100ML; MG/100ML
INJECTION, SOLUTION INTRAVENOUS
Status: DISCONTINUED | OUTPATIENT
Start: 2018-12-03 | End: 2018-12-03 | Stop reason: HOSPADM

## 2018-12-03 RX ORDER — DIPHENHYDRAMINE HYDROCHLORIDE 50 MG/ML
12.5 INJECTION, SOLUTION INTRAMUSCULAR; INTRAVENOUS AS NEEDED
Status: DISCONTINUED | OUTPATIENT
Start: 2018-12-03 | End: 2018-12-03 | Stop reason: HOSPADM

## 2018-12-03 RX ORDER — VANCOMYCIN 2 GRAM/500 ML IN 0.9 % SODIUM CHLORIDE INTRAVENOUS
2000 ONCE
Status: DISCONTINUED | OUTPATIENT
Start: 2018-12-03 | End: 2018-12-03

## 2018-12-03 RX ORDER — SUCCINYLCHOLINE CHLORIDE 20 MG/ML
INJECTION INTRAMUSCULAR; INTRAVENOUS AS NEEDED
Status: DISCONTINUED | OUTPATIENT
Start: 2018-12-03 | End: 2018-12-03 | Stop reason: HOSPADM

## 2018-12-03 RX ORDER — MIDAZOLAM HYDROCHLORIDE 1 MG/ML
INJECTION, SOLUTION INTRAMUSCULAR; INTRAVENOUS AS NEEDED
Status: DISCONTINUED | OUTPATIENT
Start: 2018-12-03 | End: 2018-12-03 | Stop reason: HOSPADM

## 2018-12-03 RX ORDER — FENTANYL CITRATE 50 UG/ML
INJECTION, SOLUTION INTRAMUSCULAR; INTRAVENOUS AS NEEDED
Status: DISCONTINUED | OUTPATIENT
Start: 2018-12-03 | End: 2018-12-03 | Stop reason: HOSPADM

## 2018-12-03 RX ORDER — SODIUM CHLORIDE 0.9 % (FLUSH) 0.9 %
10 SYRINGE (ML) INJECTION EVERY 8 HOURS
Status: DISCONTINUED | OUTPATIENT
Start: 2018-12-03 | End: 2018-12-05 | Stop reason: HOSPADM

## 2018-12-03 RX ORDER — SODIUM CHLORIDE 0.9 % (FLUSH) 0.9 %
5-10 SYRINGE (ML) INJECTION AS NEEDED
Status: DISCONTINUED | OUTPATIENT
Start: 2018-12-03 | End: 2018-12-03 | Stop reason: HOSPADM

## 2018-12-03 RX ORDER — DEXTROSE 50 % IN WATER (D50W) INTRAVENOUS SYRINGE
12.5-25 AS NEEDED
Status: DISCONTINUED | OUTPATIENT
Start: 2018-12-03 | End: 2018-12-05 | Stop reason: HOSPADM

## 2018-12-03 RX ORDER — ONDANSETRON 2 MG/ML
INJECTION INTRAMUSCULAR; INTRAVENOUS AS NEEDED
Status: DISCONTINUED | OUTPATIENT
Start: 2018-12-03 | End: 2018-12-03 | Stop reason: HOSPADM

## 2018-12-03 RX ORDER — ONDANSETRON 2 MG/ML
4 INJECTION INTRAMUSCULAR; INTRAVENOUS
Status: DISCONTINUED | OUTPATIENT
Start: 2018-12-03 | End: 2018-12-05 | Stop reason: HOSPADM

## 2018-12-03 RX ORDER — METOPROLOL TARTRATE 5 MG/5ML
INJECTION INTRAVENOUS AS NEEDED
Status: DISCONTINUED | OUTPATIENT
Start: 2018-12-03 | End: 2018-12-03 | Stop reason: HOSPADM

## 2018-12-03 RX ORDER — SODIUM CHLORIDE 0.9 % (FLUSH) 0.9 %
5-10 SYRINGE (ML) INJECTION EVERY 8 HOURS
Status: CANCELLED | OUTPATIENT
Start: 2018-12-03

## 2018-12-03 RX ORDER — FENTANYL CITRATE 50 UG/ML
25 INJECTION, SOLUTION INTRAMUSCULAR; INTRAVENOUS
Status: DISCONTINUED | OUTPATIENT
Start: 2018-12-03 | End: 2018-12-03 | Stop reason: HOSPADM

## 2018-12-03 RX ORDER — SODIUM CHLORIDE 9 MG/ML
25 INJECTION, SOLUTION INTRAVENOUS CONTINUOUS
Status: DISCONTINUED | OUTPATIENT
Start: 2018-12-03 | End: 2018-12-05 | Stop reason: HOSPADM

## 2018-12-03 RX ORDER — PROPOFOL 10 MG/ML
INJECTION, EMULSION INTRAVENOUS AS NEEDED
Status: DISCONTINUED | OUTPATIENT
Start: 2018-12-03 | End: 2018-12-03 | Stop reason: HOSPADM

## 2018-12-03 RX ORDER — SODIUM CHLORIDE 0.9 % (FLUSH) 0.9 %
5-10 SYRINGE (ML) INJECTION AS NEEDED
Status: CANCELLED | OUTPATIENT
Start: 2018-12-03

## 2018-12-03 RX ORDER — ACETAMINOPHEN 10 MG/ML
INJECTION, SOLUTION INTRAVENOUS AS NEEDED
Status: DISCONTINUED | OUTPATIENT
Start: 2018-12-03 | End: 2018-12-03 | Stop reason: HOSPADM

## 2018-12-03 RX ORDER — LIDOCAINE HYDROCHLORIDE 20 MG/ML
INJECTION, SOLUTION EPIDURAL; INFILTRATION; INTRACAUDAL; PERINEURAL AS NEEDED
Status: DISCONTINUED | OUTPATIENT
Start: 2018-12-03 | End: 2018-12-03 | Stop reason: HOSPADM

## 2018-12-03 RX ORDER — INSULIN LISPRO 100 [IU]/ML
INJECTION, SOLUTION INTRAVENOUS; SUBCUTANEOUS
Status: DISCONTINUED | OUTPATIENT
Start: 2018-12-03 | End: 2018-12-05 | Stop reason: HOSPADM

## 2018-12-03 RX ORDER — LIDOCAINE HYDROCHLORIDE 10 MG/ML
0.1 INJECTION, SOLUTION EPIDURAL; INFILTRATION; INTRACAUDAL; PERINEURAL AS NEEDED
Status: CANCELLED | OUTPATIENT
Start: 2018-12-03

## 2018-12-03 RX ORDER — SODIUM CHLORIDE, SODIUM LACTATE, POTASSIUM CHLORIDE, CALCIUM CHLORIDE 600; 310; 30; 20 MG/100ML; MG/100ML; MG/100ML; MG/100ML
25 INJECTION, SOLUTION INTRAVENOUS CONTINUOUS
Status: DISCONTINUED | OUTPATIENT
Start: 2018-12-03 | End: 2018-12-03 | Stop reason: HOSPADM

## 2018-12-03 RX ORDER — HYDROMORPHONE HYDROCHLORIDE 1 MG/ML
0.2 INJECTION, SOLUTION INTRAMUSCULAR; INTRAVENOUS; SUBCUTANEOUS
Status: DISCONTINUED | OUTPATIENT
Start: 2018-12-03 | End: 2018-12-03 | Stop reason: HOSPADM

## 2018-12-03 RX ORDER — INSULIN LISPRO 100 [IU]/ML
INJECTION, SOLUTION INTRAVENOUS; SUBCUTANEOUS EVERY 6 HOURS
Status: DISCONTINUED | OUTPATIENT
Start: 2018-12-03 | End: 2018-12-03

## 2018-12-03 RX ORDER — HYDROMORPHONE HYDROCHLORIDE 2 MG/ML
0.5 INJECTION, SOLUTION INTRAMUSCULAR; INTRAVENOUS; SUBCUTANEOUS
Status: DISCONTINUED | OUTPATIENT
Start: 2018-12-03 | End: 2018-12-05 | Stop reason: HOSPADM

## 2018-12-03 RX ORDER — ROCURONIUM BROMIDE 10 MG/ML
INJECTION, SOLUTION INTRAVENOUS AS NEEDED
Status: DISCONTINUED | OUTPATIENT
Start: 2018-12-03 | End: 2018-12-03 | Stop reason: HOSPADM

## 2018-12-03 RX ORDER — VANCOMYCIN/0.9 % SOD CHLORIDE 1.5G/250ML
1500 PLASTIC BAG, INJECTION (ML) INTRAVENOUS EVERY 24 HOURS
Status: DISCONTINUED | OUTPATIENT
Start: 2018-12-03 | End: 2018-12-05 | Stop reason: HOSPADM

## 2018-12-03 RX ADMIN — DAKIN'S SOLUTION 0.125% (QUARTER STRENGTH): 0.12 SOLUTION at 22:20

## 2018-12-03 RX ADMIN — VANCOMYCIN HYDROCHLORIDE 1500 MG: 10 INJECTION, POWDER, LYOPHILIZED, FOR SOLUTION INTRAVENOUS at 20:46

## 2018-12-03 RX ADMIN — ONDANSETRON 4 MG: 2 INJECTION INTRAMUSCULAR; INTRAVENOUS at 01:40

## 2018-12-03 RX ADMIN — INSULIN LISPRO 3 UNITS: 100 INJECTION, SOLUTION INTRAVENOUS; SUBCUTANEOUS at 11:27

## 2018-12-03 RX ADMIN — SUCCINYLCHOLINE CHLORIDE 140 MG: 20 INJECTION INTRAMUSCULAR; INTRAVENOUS at 01:10

## 2018-12-03 RX ADMIN — PIPERACILLIN SODIUM,TAZOBACTAM SODIUM 3.38 G: 3; .375 INJECTION, POWDER, FOR SOLUTION INTRAVENOUS at 11:27

## 2018-12-03 RX ADMIN — Medication 10 ML: at 20:49

## 2018-12-03 RX ADMIN — SODIUM CHLORIDE 100 ML/HR: 900 INJECTION, SOLUTION INTRAVENOUS at 02:15

## 2018-12-03 RX ADMIN — LIDOCAINE HYDROCHLORIDE 60 MG: 20 INJECTION, SOLUTION EPIDURAL; INFILTRATION; INTRACAUDAL; PERINEURAL at 01:10

## 2018-12-03 RX ADMIN — ACETAMINOPHEN 1000 MG: 10 INJECTION, SOLUTION INTRAVENOUS at 01:29

## 2018-12-03 RX ADMIN — FENTANYL CITRATE 100 MCG: 50 INJECTION, SOLUTION INTRAMUSCULAR; INTRAVENOUS at 01:10

## 2018-12-03 RX ADMIN — MIDAZOLAM HYDROCHLORIDE 1 MG: 1 INJECTION, SOLUTION INTRAMUSCULAR; INTRAVENOUS at 01:04

## 2018-12-03 RX ADMIN — PIPERACILLIN SODIUM,TAZOBACTAM SODIUM 3.38 G: 3; .375 INJECTION, POWDER, FOR SOLUTION INTRAVENOUS at 20:58

## 2018-12-03 RX ADMIN — METOPROLOL TARTRATE 2 MG: 5 INJECTION INTRAVENOUS at 01:22

## 2018-12-03 RX ADMIN — SODIUM CHLORIDE 25 ML/HR: 900 INJECTION, SOLUTION INTRAVENOUS at 19:38

## 2018-12-03 RX ADMIN — ROCURONIUM BROMIDE 5 MG: 10 INJECTION, SOLUTION INTRAVENOUS at 01:10

## 2018-12-03 RX ADMIN — PIPERACILLIN SODIUM,TAZOBACTAM SODIUM 3.38 G: 3; .375 INJECTION, POWDER, FOR SOLUTION INTRAVENOUS at 04:48

## 2018-12-03 RX ADMIN — FENTANYL CITRATE 50 MCG: 50 INJECTION, SOLUTION INTRAMUSCULAR; INTRAVENOUS at 01:27

## 2018-12-03 RX ADMIN — FENTANYL CITRATE 25 MCG: 50 INJECTION, SOLUTION INTRAMUSCULAR; INTRAVENOUS at 01:45

## 2018-12-03 RX ADMIN — FENTANYL CITRATE 50 MCG: 50 INJECTION, SOLUTION INTRAMUSCULAR; INTRAVENOUS at 01:46

## 2018-12-03 RX ADMIN — SODIUM CHLORIDE, SODIUM LACTATE, POTASSIUM CHLORIDE, CALCIUM CHLORIDE: 600; 310; 30; 20 INJECTION, SOLUTION INTRAVENOUS at 01:04

## 2018-12-03 RX ADMIN — PROPOFOL 150 MG: 10 INJECTION, EMULSION INTRAVENOUS at 01:10

## 2018-12-03 RX ADMIN — FENTANYL CITRATE 25 MCG: 50 INJECTION, SOLUTION INTRAMUSCULAR; INTRAVENOUS at 01:44

## 2018-12-03 NOTE — PROGRESS NOTES
Admit Date: 2018 POD Day of Surgery Procedure:  Procedure(s): 
INCISION AND DRAINAGE right breast 
 
 
Assessment:  
Principal Problem: 
  Breast abscess (2018) 1. Feels better 2. Dressing CDI with ace wrap Plan/Recommendations/Medical Decision Makin. Start dressing changes 2. Wound care for VAC-will need at discharge 3. Continue abx Subjective:  
 
Patient has complaints of pain. Objective:  
 
Blood pressure 102/62, pulse 72, temperature 97.3 °F (36.3 °C), resp. rate 16, SpO2 97 %, not currently breastfeeding. Temp (24hrs), Av.4 °F (36.9 °C), Min:97.3 °F (36.3 °C), Max:100.5 °F (38.1 °C) Physical Exam:  LUNG: clear to auscultation bilaterally, BREAST:  Right with dressing CDI, HEART: regular rate and rhythm, S1, S2 normal, no murmur, click, rub or gallop, ABDOMEN: soft, non-tender. Bowel sounds normal. No masses,  no organomegaly Labs:  
Recent Results (from the past 48 hour(s)) CULTURE, BLOOD, PAIRED Collection Time: 18  7:10 PM  
Result Value Ref Range Special Requests: NO SPECIAL REQUESTS Culture result: NO GROWTH AFTER 11 HOURS METABOLIC PANEL, COMPREHENSIVE Collection Time: 18  7:15 PM  
Result Value Ref Range Sodium 137 136 - 145 mmol/L Potassium 3.7 3.5 - 5.1 mmol/L Chloride 107 97 - 108 mmol/L  
 CO2 18 (L) 21 - 32 mmol/L Anion gap 12 5 - 15 mmol/L Glucose 184 (H) 65 - 100 mg/dL BUN 23 (H) 6 - 20 MG/DL Creatinine 1.32 (H) 0.55 - 1.02 MG/DL  
 BUN/Creatinine ratio 17 12 - 20 GFR est AA 48 (L) >60 ml/min/1.73m2 GFR est non-AA 40 (L) >60 ml/min/1.73m2 Calcium 8.7 8.5 - 10.1 MG/DL Bilirubin, total 2.9 (H) 0.2 - 1.0 MG/DL  
 ALT (SGPT) 15 12 - 78 U/L  
 AST (SGOT) 15 15 - 37 U/L Alk. phosphatase 103 45 - 117 U/L Protein, total 6.8 6.4 - 8.2 g/dL Albumin 2.7 (L) 3.5 - 5.0 g/dL Globulin 4.1 (H) 2.0 - 4.0 g/dL A-G Ratio 0.7 (L) 1.1 - 2.2 LACTIC ACID  
 Collection Time: 12/02/18  7:15 PM  
Result Value Ref Range Lactic acid 1.8 0.4 - 2.0 MMOL/L  
CBC WITH AUTOMATED DIFF Collection Time: 12/02/18  7:15 PM  
Result Value Ref Range WBC 4.3 3.6 - 11.0 K/uL  
 RBC 3.57 (L) 3.80 - 5.20 M/uL  
 HGB 11.0 (L) 11.5 - 16.0 g/dL HCT 33.9 (L) 35.0 - 47.0 % MCV 95.0 80.0 - 99.0 FL  
 MCH 30.8 26.0 - 34.0 PG  
 MCHC 32.4 30.0 - 36.5 g/dL  
 RDW 16.4 (H) 11.5 - 14.5 % PLATELET 269 869 - 980 K/uL MPV 10.2 8.9 - 12.9 FL  
 NRBC 0.0 0  WBC ABSOLUTE NRBC 0.00 0.00 - 0.01 K/uL NEUTROPHILS 85 (H) 32 - 75 % LYMPHOCYTES 8 (L) 12 - 49 % MONOCYTES 6 5 - 13 % EOSINOPHILS 0 0 - 7 % BASOPHILS 1 0 - 1 % IMMATURE GRANULOCYTES 0 0.0 - 0.5 % ABS. NEUTROPHILS 3.7 1.8 - 8.0 K/UL  
 ABS. LYMPHOCYTES 0.3 (L) 0.8 - 3.5 K/UL  
 ABS. MONOCYTES 0.3 0.0 - 1.0 K/UL  
 ABS. EOSINOPHILS 0.0 0.0 - 0.4 K/UL  
 ABS. BASOPHILS 0.0 0.0 - 0.1 K/UL  
 ABS. IMM. GRANS. 0.0 0.00 - 0.04 K/UL  
 DF AUTOMATED    
 RBC COMMENTS ANISOCYTOSIS 
1+ URINALYSIS W/MICROSCOPIC Collection Time: 12/02/18 11:15 PM  
Result Value Ref Range Color YELLOW/STRAW Appearance CLOUDY (A) CLEAR Specific gravity >1.030 (H) 1.003 - 1.030  
 pH (UA) 5.5 5.0 - 8.0 Protein 30 (A) NEG mg/dL Glucose NEGATIVE  NEG mg/dL Ketone TRACE (A) NEG mg/dL Bilirubin NEGATIVE  NEG Blood NEGATIVE  NEG Urobilinogen 1.0 0.2 - 1.0 EU/dL Nitrites NEGATIVE  NEG Leukocyte Esterase MODERATE (A) NEG    
 WBC 20-50 0 - 4 /hpf  
 RBC 0-5 0 - 5 /hpf Epithelial cells MODERATE (A) FEW /lpf Bacteria 1+ (A) NEG /hpf  
GLUCOSE, POC Collection Time: 12/03/18  2:05 AM  
Result Value Ref Range Glucose (POC) 136 (H) 65 - 100 mg/dL Performed by Johnny KENDALL   
CBC WITH AUTOMATED DIFF Collection Time: 12/03/18  5:27 AM  
Result Value Ref Range WBC 3.6 3.6 - 11.0 K/uL  
 RBC 3.00 (L) 3.80 - 5.20 M/uL HGB 9.2 (L) 11.5 - 16.0 g/dL HCT 28.3 (L) 35.0 - 47.0 % MCV 94.3 80.0 - 99.0 FL  
 MCH 30.7 26.0 - 34.0 PG  
 MCHC 32.5 30.0 - 36.5 g/dL  
 RDW 16.2 (H) 11.5 - 14.5 % PLATELET 604 025 - 859 K/uL MPV 10.3 8.9 - 12.9 FL  
 NRBC 0.0 0  WBC ABSOLUTE NRBC 0.00 0.00 - 0.01 K/uL NEUTROPHILS 80 (H) 32 - 75 % LYMPHOCYTES 11 (L) 12 - 49 % MONOCYTES 7 5 - 13 % EOSINOPHILS 0 0 - 7 % BASOPHILS 0 0 - 1 % IMMATURE GRANULOCYTES 1 (H) 0.0 - 0.5 % ABS. NEUTROPHILS 2.9 1.8 - 8.0 K/UL  
 ABS. LYMPHOCYTES 0.4 (L) 0.8 - 3.5 K/UL  
 ABS. MONOCYTES 0.3 0.0 - 1.0 K/UL  
 ABS. EOSINOPHILS 0.0 0.0 - 0.4 K/UL  
 ABS. BASOPHILS 0.0 0.0 - 0.1 K/UL  
 ABS. IMM. GRANS. 0.0 0.00 - 0.04 K/UL  
 DF AUTOMATED    
GLUCOSE, POC Collection Time: 12/03/18  8:42 AM  
Result Value Ref Range Glucose (POC) 129 (H) 65 - 100 mg/dL Performed by Colin Valencia Data Review images and reports reviewed

## 2018-12-03 NOTE — ED TRIAGE NOTES
Pt presents to ED s/p fall at home while getting off the toilet seat at home. Pt has been experiencing weakness x2 days, worsened today. Pt also presents with swollen biopsy site to right upper side that was done on Nov 13th to rule out breast cancer. Pt states she has been feverish for the past 5 days. Temp is currently 99.3F. Pt endorses pain to site, and reports discharge and oozing since day biopsy was done. States she has been following up with her doctors. Pt is tachycardic with RR @ 22. Code purple called. Blood cultures being drawn at bedside.

## 2018-12-03 NOTE — ANESTHESIA PREPROCEDURE EVALUATION
Anesthetic History No history of anesthetic complications Review of Systems / Medical History Patient summary reviewed and pertinent labs reviewed Pulmonary Within defined limits Comments: Former smoker - [address] Neuro/Psych Within defined limits Cardiovascular Hypertension: well controlled Dysrhythmias : atrial fibrillation CAD and cardiac stents Exercise tolerance: >4 METS 
  
GI/Hepatic/Renal 
Within defined limits Comments: S/P GBP (1995) Endo/Other Diabetes: well controlled, type 2 Morbid obesity Pertinent negatives: No cancer Other Findings Comments: Right breast hematoma with abscess Physical Exam 
 
Airway Mallampati: III 
TM Distance: 4 - 6 cm Neck ROM: decreased range of motion Mouth opening: Normal 
 
 Cardiovascular Rhythm: regular Rate: normal 
 
 
 
 Dental 
 
 
Comments: Several missing teeth, none loose. Pulmonary Breath sounds clear to auscultation Abdominal 
GI exam deferred Other Findings Anesthetic Plan ASA: 3 Anesthesia type: general 
 
 
 
 
Induction: Intravenous Anesthetic plan and risks discussed with: Patient

## 2018-12-03 NOTE — ED PROVIDER NOTES
EMERGENCY DEPARTMENT HISTORY AND PHYSICAL EXAM 
 
 
Date: 12/2/2018 Patient Name: Jessica Rodriguez History of Presenting Illness Chief Complaint Patient presents with  Fall  
  pt states she fell at home while getting off the toilet seat, denies hitting head,   
 Wound Check  
  biospy site to right upper side, was taken to rule out breast cancer on Nov 13th, pt states site has been swollen and oozing since procedure History Provided By: Patient HPI: Jessica Rodriguez, 72 y.o. female with PMHx significant for HTN, DM, CAD, arrhythmia, cholecystectomy, hysterectomy, gastric bypass, mastectomy of right breast, benign left breast biopsy, presents via EMS to the ED with cc of gradual onset right breast biopsy site pain, with associated fever of 100.5 F and general weakness, onset 11/29/2018. Pt reports she is unable to ambulate due to progressive generalized weakness. Pt reports on 11/13/2018, she had biopsy done of right breast.  Pt reports s/p biopsy, pt had  bleeding due to taking Eliquis shortly after operation, and pt received ~4units of blood. Pt denies taking abx s/p operation. There are no other complaints, changes, or physical findings at this time. PCP: Jane Murray MD 
 
Current Facility-Administered Medications Medication Dose Route Frequency Provider Last Rate Last Dose  sodium chloride (NS) flush 5-10 mL  5-10 mL IntraVENous PRN Sue Harris MD      
 vancomycin [facility]) 2000 mg in  ml infusion  2,000 mg IntraVENous NOW Paco Carl  mL/hr at 12/02/18 2117 2,000 mg at 12/02/18 2117  
 lactated Ringers infusion 1,000 mL  1,000 mL IntraVENous CONTINUOUS Paco Carl MD   1,000 mL at 12/02/18 2105  lactated Ringers infusion 1,000 mL  1,000 mL IntraVENous CONTINUOUS Paco Carl MD   1,000 mL at 12/02/18 2102 Current Outpatient Medications Medication Sig Dispense Refill  lisinopril-hydroCHLOROthiazide (PRINZIDE, ZESTORETIC) 20-12.5 mg per tablet Take  by mouth daily.  simvastatin (ZOCOR) 40 mg tablet Take 40 mg by mouth nightly.  metoprolol tartrate (LOPRESSOR) 50 mg tablet Take  by mouth daily.  ondansetron (ZOFRAN ODT) 4 mg disintegrating tablet Take 1 Tab by mouth every eight (8) hours as needed for Nausea. 15 Tab 0  
 oxyCODONE-acetaminophen (PERCOCET) 5-325 mg per tablet Take 1 Tab by mouth every six (6) hours as needed for Pain. Max Daily Amount: 4 Tabs. 30 Tab 0  
 enoxaparin (LOVENOX) 100 mg/mL by SubCUTAneous route every twelve (12) hours.  metFORMIN (GLUCOPHAGE) 500 mg tablet Take  by mouth two (2) times daily (with meals).  Liraglutide (VICTOZA 3-LUIS) 0.6 mg/0.1 mL (18 mg/3 mL) pnij 0.6 mg by SubCUTAneous route. Past History Past Medical History: 
Past Medical History:  
Diagnosis Date  Arrhythmia Atrial Fibrillation  CAD (coronary artery disease)  Diabetes ([facility] [address]  Hypertension Past Surgical History: 
Past Surgical History:  
Procedure Laterality Date  BREAST SURGERY PROCEDURE UNLISTED    
 left breast bx- benign  CARDIAC SURG PROCEDURE UNLIST   or  Coronary Stent [address]  HX MASTECTOMY Right 2018 RIGHT BREAST BIOPSY WITH NEEDLE LOCALIZATION (9310) performed by Girma Leon MD at [address] [facility] Family History: 
Family History Problem Relation Age of Onset  Cancer Mother 72  
     lung Social History: 
Social History Tobacco Use  Smoking status: Former Smoker Last attempt to quit: 1992 Years since quittin.0  Smokeless tobacco: Never Used Substance Use Topics  Alcohol use: Yes Comment: oc  Drug use: No  
 
 
Allergies: 
No Known Allergies Review of Systems Review of Systems Constitutional: Positive for fever. Negative for chills and fatigue. HENT: Negative for congestion, ear pain and rhinorrhea. Eyes: Negative for pain and visual disturbance. Respiratory: Negative for cough and shortness of breath. Cardiovascular: Negative for chest pain and leg swelling. Gastrointestinal: Negative for abdominal pain, diarrhea, nausea and vomiting. Genitourinary: Negative for dysuria and flank pain. Musculoskeletal: Negative for back pain and neck pain. Skin: Positive for wound (right breast). Negative for rash. Neurological: Positive for weakness. Negative for dizziness, syncope and headaches. Psychiatric/Behavioral: Negative for self-injury and suicidal ideas. Physical Exam  
Physical Exam  
 
GENERAL: alert and oriented, no acute distress EYES: PEERL, No injection, discharge or icterus. HENT: Dry mucous membranes. NECK: Supple LUNGS: Airway patent. Non-labored respirations. Breath sounds clear with good air entry bilaterally. HEART: Regular rhythm, tachycardic . No peripheral edema. ABDOMEN: Non-distended and non-tender, without guarding or rebound. SKIN:  warm, dry MSK/ EXTREMITIES: Without swelling, tenderness or deformity, symmetric with normal ROM. Right breast ecchymotic, erythematous, warm, tender, and indurated. NEUROLOGICAL: Alert, oriented Diagnostic Study Results Labs - Recent Results (from the past 12 hour(s)) METABOLIC PANEL, COMPREHENSIVE Collection Time: 12/02/18  7:15 PM  
Result Value Ref Range Sodium 137 136 - 145 mmol/L Potassium 3.7 3.5 - 5.1 mmol/L Chloride 107 97 - 108 mmol/L  
 CO2 18 (L) 21 - 32 mmol/L Anion gap 12 5 - 15 mmol/L Glucose 184 (H) 65 - 100 mg/dL BUN 23 (H) 6 - 20 MG/DL Creatinine 1.32 (H) 0.55 - 1.02 MG/DL  
 BUN/Creatinine ratio 17 12 - 20 GFR est AA 48 (L) >60 ml/min/1.73m2 GFR est non-AA 40 (L) >60 ml/min/1.73m2  Calcium 8.7 8.5 - 10.1 MG/DL  
 Bilirubin, total 2.9 (H) 0.2 - 1.0 MG/DL  
 ALT (SGPT) 15 12 - 78 U/L  
 AST (SGOT) 15 15 - 37 U/L Alk. phosphatase 103 45 - 117 U/L Protein, total 6.8 6.4 - 8.2 g/dL Albumin 2.7 (L) 3.5 - 5.0 g/dL Globulin 4.1 (H) 2.0 - 4.0 g/dL A-G Ratio 0.7 (L) 1.1 - 2.2 LACTIC ACID Collection Time: 12/02/18  7:15 PM  
Result Value Ref Range Lactic acid 1.8 0.4 - 2.0 MMOL/L  
CBC WITH AUTOMATED DIFF Collection Time: 12/02/18  7:15 PM  
Result Value Ref Range WBC 4.3 3.6 - 11.0 K/uL  
 RBC 3.57 (L) 3.80 - 5.20 M/uL  
 HGB 11.0 (L) 11.5 - 16.0 g/dL HCT 33.9 (L) 35.0 - 47.0 % MCV 95.0 80.0 - 99.0 FL  
 MCH 30.8 26.0 - 34.0 PG  
 MCHC 32.4 30.0 - 36.5 g/dL  
 RDW 16.4 (H) 11.5 - 14.5 % PLATELET 373 534 - 841 K/uL MPV 10.2 8.9 - 12.9 FL  
 NRBC 0.0 0  WBC ABSOLUTE NRBC 0.00 0.00 - 0.01 K/uL NEUTROPHILS 85 (H) 32 - 75 % LYMPHOCYTES 8 (L) 12 - 49 % MONOCYTES 6 5 - 13 % EOSINOPHILS 0 0 - 7 % BASOPHILS 1 0 - 1 % IMMATURE GRANULOCYTES 0 0.0 - 0.5 % ABS. NEUTROPHILS 3.7 1.8 - 8.0 K/UL  
 ABS. LYMPHOCYTES 0.3 (L) 0.8 - 3.5 K/UL  
 ABS. MONOCYTES 0.3 0.0 - 1.0 K/UL  
 ABS. EOSINOPHILS 0.0 0.0 - 0.4 K/UL  
 ABS. BASOPHILS 0.0 0.0 - 0.1 K/UL  
 ABS. IMM. GRANS. 0.0 0.00 - 0.04 K/UL  
 DF AUTOMATED    
 RBC COMMENTS ANISOCYTOSIS 1+ Radiologic Studies -  
CT CHEST W CONT Final Result CT Results  (Last 48 hours) 12/02/18 2202  CT CHEST W CONT Final result Impression:  IMPRESSION:  
15 x 9 x 8 cm right chest wall collection in the lateral right breast most  
likely represents an infected hematoma. There appears to be communication with  
the skin. Clear lungs. Secondary evidence of chronic pulmonary hypertension. Narrative:  INDICATION: Right breast wound, pain, and swelling. Low-grade fever. Right  
breast biopsy last month. Tachycardia. Recent fall. COMPARISON: None. TECHNIQUE:  Following the uneventful intravenous administration of 100 cc Isovue-300, 5 mm axial images were obtained through the chest. Coronal and  
sagittal reconstructions were generated. CT dose reduction was achieved through  
use of a standardized protocol tailored for this examination and automatic  
exposure control for dose modulation. FINDINGS:  
Chest wall: Collection of fluid and gas in the right chest wall involves the  
lateral right breast and measures 15 x 9 x 8 cm and 86 Hounsfield units. There  
is edema in the remainder of the right breast. No axillary lymphadenopathy. No  
internal mammary lymphadenopathy. THYROID: No nodule. MEDIASTINUM: No mass or lymphadenopathy. RONNELL: No mass or lymphadenopathy. THORACIC AORTA: No dissection or aneurysm. MAIN PULMONARY ARTERY: Main pulmonary artery is dilated. TRACHEA/BRONCHI: Patent. ESOPHAGUS: No wall thickening or dilatation. HEART: Normal cardiac size. Coronary artery calcific atherosclerosis includes  
the left main coronary artery. PLEURA: No effusion or pneumothorax. LUNGS: No nodule, mass, or airspace disease. INCIDENTALLY IMAGED UPPER ABDOMEN: No focal abnormality. BONES: No fracture or evidence of osteomyelitis. Medical Decision Making I am the first provider for this patient. I reviewed the vital signs, available nursing notes, past medical history, past surgical history, family history and social history. Vital Signs-Reviewed the patient's vital signs. Patient Vitals for the past 12 hrs: 
 Temp Pulse Resp BP SpO2  
12/02/18 2246  91 18 131/57 98 % 12/02/18 2145  91 20 117/58 97 % 12/02/18 2100  (!) 104 25 129/83 95 % 12/02/18 2015  (!) 109 20 135/75 96 % 12/02/18 2000  (!) 107 20 138/69 98 % 12/02/18 1945  (!) 111 19 151/84 98 % 12/02/18 1858 99.3 °F (37.4 °C) (!) 109 22 147/80 99 % Pulse Oximetry Analysis - 99% on RA Cardiac Monitor:  
Rate: 109 bpm 
 Rhythm: Sinus Tachycardi Records Reviewed: Nursing Notes, Old Medical Records and Ambulance Run Sheet Provider Notes (Medical Decision Making): On presentation the patient is ill appearing, in no acute distress with vital signs notable for tachcyardia. Based on the history and exam the differential diagnosis for this patient includes sepsis, severe sepsis, abscess, anemia, and dehydration. Workup c/w with likely infected large breast hematoma. Cultures sent and antibiotics initiated. Discussed with general surgery and will take patient to OR . ED Course:  
Initial assessment performed. The patients presenting problems have been discussed, and they are in agreement with the care plan formulated and outlined with them. I have encouraged them to ask questions as they arise throughout their visit. Consult Note: 
10:41 PM 
Maximion Sepulveda MD spoke with Elin Patricia MD, Specialty: General Surgery Discussed pt's hx, disposition, and available diagnostic and imaging results. Reviewed care plans. Consultant agrees with plans as outlined. Elin Patricia MD will evaluate and admit pt, likely  to OR tonight. Gerson Lamb Disposition: 
Admit Note: 
10:51 PM 
Pt is being admitted by Elin Patricia MD. The results of their tests and reason(s) for their admission have been discussed with pt and/or available family. They convey agreement and understanding for the need to be admitted and for admission diagnosis. Return to ED if worse Diagnosis Clinical Impression: 1. Sepsis 2. breast abscess 3. anemia Attestations: This note is prepared by Joel Min, acting as Scribe for Provender. MD Viet Matos MD: The scribe's documentation has been prepared under my direction and personally reviewed by me in its entirety. I confirm that the note above accurately reflects all work, treatment, procedures, and medical decision making performed by me.

## 2018-12-03 NOTE — PROGRESS NOTES
*CM acknowledged consult* CM will complete room visit and inform pt about wound vac and HH recommended by MD.  CM will complete requested documentation for home wound vac. Once MD and wound care nurse sign requested documents, CM will send wound vac forms to [facility] (wound vac company). CONOR will leave FYI for MD to acknowledge documents located in pt's charts. **please sign and compete wound vac documents located in pt's charts** 
 
Fonda Saint, MSW  
[phone]

## 2018-12-03 NOTE — ANESTHESIA POSTPROCEDURE EVALUATION
Procedure(s): 
INCISION AND DRAINAGE right breast. 
 
Anesthesia Post Evaluation Patient location during evaluation: PACU Note status: Adequate. Level of consciousness: responsive to verbal stimuli and sleepy but conscious Pain management: satisfactory to patient Airway patency: patent Anesthetic complications: no 
Cardiovascular status: acceptable Respiratory status: acceptable Hydration status: acceptable Comments: +Post-Anesthesia Evaluation and Assessment Patient: Berto Cash MRN: [id number]  SSN: [id number] YOB: 1945  Age: 72 y.o. Sex: female Cardiovascular Function/Vital Signs /68   Pulse 82   Temp 36.8 °C (98.2 °F)   Resp 20   SpO2 97% Patient is status post Procedure(s): 
INCISION AND DRAINAGE right breast. 
 
Nausea/Vomiting: Controlled. Postoperative hydration reviewed and adequate. Pain: 
Pain Scale 1: Numeric (0 - 10) (12/03/18 0205) Pain Intensity 1: 0 (12/03/18 0205) Managed. Neurological Status:  
Neuro (WDL): Exceptions to WDL (12/03/18 0159) At baseline. Mental Status and Level of Consciousness: Arousable. Pulmonary Status:  
O2 Device: Nasal cannula (12/03/18 0205) Adequate oxygenation and airway patent. Complications related to anesthesia: None Post-anesthesia assessment completed. No concerns. Signed By: Emily Arizmendi MD  
 12/3/2018 Post anesthesia nausea and vomiting:  controlled Visit Vitals /68 Pulse 82 Temp 36.8 °C (98.2 °F) Resp 20 SpO2 97%

## 2018-12-03 NOTE — PERIOP NOTES
0159-Handoff Report from Operating Room to PACU Report received from [facility] and Josselin Reyes CRNA regarding David Gonzalez. Surgeon(s): 
Luiz Mcconnell MD  And Procedure(s) (LRB): 
INCISION AND DRAINAGE right breast (Right)  confirmed  
with allergies and dressings discussed. Anesthesia type, drugs, patient history, complications, estimated blood loss, vital signs, intake and output, and last pain medication, lines, reversal medications and temperature were reviewed. 0230- Family at bedside. 0300- TRANSFER - OUT REPORT: 
 
Verbal report given to Claudia Seth RN(name) on David Gonzalez  being transferred to [facility] for routine post - op Report consisted of patients Situation, Background, Assessment and  
Recommendations(SBAR). Information from the following report(s) SBAR, Kardex, OR Summary, Procedure Summary, Intake/Output, MAR and Recent Results was reviewed with the receiving nurse. Opportunity for questions and clarification was provided. Patient transported with: 
 O2 @ 2 liters Registered Nurse 
 
0400- IV seen to be leaking right before pt was to be transported to her room. Primary RN attempted 4 times without success. Dr. Mason Valdes called for assistance. 22g placed in the right hand. Pt transported to her room at this time.

## 2018-12-03 NOTE — OP NOTES
[address]  OPERATIVE REPORT    Nathaniel Coles  MR#: [id number]  : 1945  ACCOUNT #: [de-identified]   DATE OF SERVICE: 2018    PREOPERATIVE DIAGNOSES:  Right breast infected hematoma with abscess. POSTOPERATIVE DIAGNOSES:   Right breast infected hematoma with abscess. PROCEDURE PERFORMED:  Incision and drainage of right breast infected hematoma with abscess. SURGEON:  Rakel Boyce MD    ANESTHESIA:  General endotracheal anesthesia by Shayla Castañeda MD    SURGICAL ASSISTANT:  None. SURGICAL FIRST ASSISTANT:  Erlinda Blunt     BLOOD LOSS:  Less than 10 mL of active bleeding. SPECIMENS REMOVED:  Right breast wound and hematoma and right breast wound swabs sent for aerobic, anaerobic culture and Gram stain. FINDINGS:  This is a massive, partially liquified hematoma in the right breast with 2 areas of full thickness skin necrosis and abscess. IMPLANTS:  There were no implants. The wound was packed at the end of the case with 1/4 strength Dakin's moistened gauze. DESCRIPTION OF OPERATION IN DETAIL:  After appropriate consent was obtained, the patient was competent was brought to the operating room, made comfortable in the supine position and administered general endotracheal anesthesia. The patient was prepped and draped in standard fashion and a surgical timeout was completed. At this time, the full thickness skin wound in the lateral most right breast at the level of the axilla was incised inferiorly and liquified hematoma under pressure poured out from the wound and some of this was swabbed for aerobic, anaerobic culture and Gram stain. Once this draining slowed down, the suction device was placed through the wound and then the full thickness skin necrosis was excised sharply with electrocautery to viable tissue.   A second area of full thickness skin necrosis anterior to this was also excised and these 2 areas were connected with a small skin bridge which appeared to be viable. There was a massive amount of organized hematoma in the wound base and this was evacuated, which was sent to pathology along with the necrotic skin for examination. Altogether, the liquified and organized hematoma took up to the volume of about a small grapefruit. Once this was all evacuated, the wound was irrigated with bacitracin saline solution with the pulse lavage  down to clean tissue. And when this was completed, hemostasis was confirmed and the wound was packed with a total of 3 full size Kerlix gauzes which were moistened with 1/4 strength Dakin's solution. When the wound was completely packed, it was cleaned and dried and dressed with sterile dressings and a compressive Ace wrap circumferentially around the upper chest.  The patient was then awakened, extubated and transferred to the recovery room in stable condition.       MD TYE Van / RN  D: 12/03/2018 02:00     T: 12/03/2018 09:56  JOB #: [id number]  CC: Macario Velazco MD

## 2018-12-03 NOTE — H&P
Surgery History and Physcial 
 
Subjective:  
  
Zahra Damon is a 72 y.o. female who presents for evaluation of worsening right breast and chest pain. She has a complicated course since 18 when she underwent a needle localized breast biopsy at [facility] for presumed LCIS. Path showed  RIGHT BREAST MASS, NEEDLE LOCALIZED EXCISION FOCAL FIBROADENOMATOID CHANGE WITH ASSOCIATED STROMAL MICROCALCIFICATIONS. NO IN SITU OR INVASIVE CARCINOMA IDENTIFIED. SURGICAL MARGINS NEGATIVE. Her cardiologist told her to restart her Eliquis the following day for Afib and h/o cardiac stents 8 years ago. She developed a massive right breast hematoma and required admission on  and received 4 units of PRBC and 2 units of FFP. She has been having problems since and is currently Dog sitting for her son in the area when she sought medical attention today. Patient Active Problem List  
 Diagnosis Date Noted  Breast abscess 2018  Acute renal failure (ARF) ([address] 2018  Hypotension arterial 2018  Metabolic acidosis   Acute blood loss anemia 2018  Microcalcifications of the breast 2018 Past Medical History:  
Diagnosis Date  Arrhythmia Atrial Fibrillation  CAD (coronary artery disease)  Diabetes ([address]  Hypertension Past Surgical History:  
Procedure Laterality Date  BREAST SURGERY PROCEDURE UNLISTED    
 left breast bx- benign  CARDIAC SURG PROCEDURE UNLIST   or  Coronary Stent [address]  HX MASTECTOMY Right 2018 RIGHT BREAST BIOPSY WITH NEEDLE LOCALIZATION (1030) performed by Juan Salazar MD at [address] [facility] Social History Tobacco Use  Smoking status: Former Smoker Last attempt to quit: 1992 Years since quittin.0  Smokeless tobacco: Never Used Substance Use Topics  Alcohol use:  Yes  
 Comment: oc Family History Problem Relation Age of Onset  Cancer Mother 72  
     lung Prior to Admission medications Medication Sig Start Date End Date Taking? Authorizing Provider  
lisinopril-hydroCHLOROthiazide (PRINZIDE, ZESTORETIC) 20-12.5 mg per tablet Take  by mouth daily. Yes Provider, Historical  
simvastatin (ZOCOR) 40 mg tablet Take 40 mg by mouth nightly. Yes Provider, Historical  
metoprolol tartrate (LOPRESSOR) 50 mg tablet Take  by mouth daily. Yes Provider, Historical  
ondansetron (ZOFRAN ODT) 4 mg disintegrating tablet Take 1 Tab by mouth every eight (8) hours as needed for Nausea. 11/15/18   Derik Chew MD  
oxyCODONE-acetaminophen (PERCOCET) 5-325 mg per tablet Take 1 Tab by mouth every six (6) hours as needed for Pain. Max Daily Amount: 4 Tabs. 11/13/18   Derik Chew MD  
enoxaparin (LOVENOX) 100 mg/mL by SubCUTAneous route every twelve (12) hours. Provider, Historical  
metFORMIN (GLUCOPHAGE) 500 mg tablet Take  by mouth two (2) times daily (with meals). Provider, Historical  
Liraglutide (VICTOZA 3-LUIS) 0.6 mg/0.1 mL (18 mg/3 mL) pnij 0.6 mg by SubCUTAneous route. Provider, Historical  
 
No Known Allergies Review of Systems Constitutional: Positive for chills and fever. Negative for diaphoresis. Respiratory: Negative for shortness of breath and wheezing. Cardiovascular: Negative for chest pain and palpitations. Gastrointestinal: Negative for abdominal pain, diarrhea, nausea and vomiting. Musculoskeletal: Negative for myalgias. Skin: Positive for wound. Hematological: Does not bruise/bleed easily. Objective:  
 
Visit Vitals /78 (BP 1 Location: Right arm, BP Patient Position: At rest) Pulse 89 Temp (!) 100.5 °F (38.1 °C) Resp 18 SpO2 99% Physical Exam  
Constitutional: She appears well-developed and well-nourished. No distress. HENT:  
Head: Normocephalic and atraumatic. Cardiovascular: Normal rate, regular rhythm, normal heart sounds and intact distal pulses. Pulmonary/Chest: Breath sounds normal. She has no wheezes. She has no rales. Abdominal: Soft. Bowel sounds are normal. She exhibits no distension and no mass. There is no tenderness. There is no rebound and no guarding. Musculoskeletal: Normal range of motion. Lymphadenopathy:  
  She has no cervical adenopathy. Skin: Ecchymosis noted. Imaging:  images and reports reviewed Lab Review:   
Recent Results (from the past 24 hour(s)) METABOLIC PANEL, COMPREHENSIVE Collection Time: 12/02/18  7:15 PM  
Result Value Ref Range Sodium 137 136 - 145 mmol/L Potassium 3.7 3.5 - 5.1 mmol/L Chloride 107 97 - 108 mmol/L  
 CO2 18 (L) 21 - 32 mmol/L Anion gap 12 5 - 15 mmol/L Glucose 184 (H) 65 - 100 mg/dL BUN 23 (H) 6 - 20 MG/DL Creatinine 1.32 (H) 0.55 - 1.02 MG/DL  
 BUN/Creatinine ratio 17 12 - 20 GFR est AA 48 (L) >60 ml/min/1.73m2 GFR est non-AA 40 (L) >60 ml/min/1.73m2 Calcium 8.7 8.5 - 10.1 MG/DL Bilirubin, total 2.9 (H) 0.2 - 1.0 MG/DL  
 ALT (SGPT) 15 12 - 78 U/L  
 AST (SGOT) 15 15 - 37 U/L Alk. phosphatase 103 45 - 117 U/L Protein, total 6.8 6.4 - 8.2 g/dL Albumin 2.7 (L) 3.5 - 5.0 g/dL Globulin 4.1 (H) 2.0 - 4.0 g/dL A-G Ratio 0.7 (L) 1.1 - 2.2 LACTIC ACID Collection Time: 12/02/18  7:15 PM  
Result Value Ref Range Lactic acid 1.8 0.4 - 2.0 MMOL/L  
CBC WITH AUTOMATED DIFF Collection Time: 12/02/18  7:15 PM  
Result Value Ref Range WBC 4.3 3.6 - 11.0 K/uL  
 RBC 3.57 (L) 3.80 - 5.20 M/uL  
 HGB 11.0 (L) 11.5 - 16.0 g/dL HCT 33.9 (L) 35.0 - 47.0 % MCV 95.0 80.0 - 99.0 FL  
 MCH 30.8 26.0 - 34.0 PG  
 MCHC 32.4 30.0 - 36.5 g/dL  
 RDW 16.4 (H) 11.5 - 14.5 % PLATELET 421 069 - 917 K/uL MPV 10.2 8.9 - 12.9 FL  
 NRBC 0.0 0  WBC ABSOLUTE NRBC 0.00 0.00 - 0.01 K/uL NEUTROPHILS 85 (H) 32 - 75 % LYMPHOCYTES 8 (L) 12 - 49 % MONOCYTES 6 5 - 13 % EOSINOPHILS 0 0 - 7 % BASOPHILS 1 0 - 1 % IMMATURE GRANULOCYTES 0 0.0 - 0.5 % ABS. NEUTROPHILS 3.7 1.8 - 8.0 K/UL  
 ABS. LYMPHOCYTES 0.3 (L) 0.8 - 3.5 K/UL  
 ABS. MONOCYTES 0.3 0.0 - 1.0 K/UL  
 ABS. EOSINOPHILS 0.0 0.0 - 0.4 K/UL  
 ABS. BASOPHILS 0.0 0.0 - 0.1 K/UL  
 ABS. IMM. GRANS. 0.0 0.00 - 0.04 K/UL  
 DF AUTOMATED    
 RBC COMMENTS ANISOCYTOSIS 
1+ Assessment:  
 
Infected massive right breast hematoma following needle loc breast biopsy 11/13/18 and requiring 4 units PRBC and 2 units FFP on 11/18/18. CT demonstrates extensive gas throughout the hematoma and pt has marked pain and tenderness, as well as resting tachycardia. Hb 11. WBC 4.3. Temp 100.5. Plan: 1. I recommend proceeding with Surgery:  right breast evacuation of infected hematoma with pulse lavage irrigation and packing. Follow up with Dr. Gage Darden tomorrow. 2. Discussed aspects of surgical intervention, methods, risks including by not limited to infection, bleeding, hematoma, need for ongoing wound care, possible need for additional procedures, and the risks of general anesthetic. The patient understands the risks; any and all questions were answered to the patient's satisfaction. Signed By: Adelaida Medina MD, [address] [place] [address]. [facility] December 2, 2018

## 2018-12-03 NOTE — PROGRESS NOTES
Reason for Readmission:   Breast Abscess RRAT Score and Risk Level:    11 Level of Readmission:    LOW Care Conference scheduled:  Spoke with pt's family: spouse and son Resources/supports as identified by patient/family: Pt will reside with son here in [place], [place] for a few days before returning back to [place], [place] Top Challenges facing patient (as identified by patient/family and CM): Finances/Medication cost? Pt has insurance to cover the cost of hospitalization and medication Transportation  Pt's spouse and son will transport pt to and from medical appointment Support system or lack thereof? Pt's spouse and children Living arrangements? Pt resides with spouse in [place], [place] Self-care/ADLs/Cognition? Independent with ADLs, and drives Current Advanced Directive/Advance Care Plan:  FULL Plan for utilizing home health:  Home with home health Likelihood of additional readmission:  MODERATE-Readmission Transition of Care Plan:    Based on readmission, the patient's previous Plan of Care 
 has been evaluated and/or modified. The current Transition of Care Plan is:        
 
CM completed room visit with pt, with family by bedside. Pt was alert and oriented, upon CM room visit. Pt reported that she resides with spouse in their one story home (3 steeps into main entrance). Pt reported that they are independent and drives. Pt reported that she is active with PCP: Has appointment Dec 2018, and use [facility]. Pt reported DME at home: walker. Pt reported no SNF/HH in the past. 
 
CM informed pt of home wound vac and HH at d/c. Pt agreed to both services. Pt informed of FOC document (signed) placed in chart. CM prepared home wound vac documents and placed in chart.   MD and wound care nurse aware that they will have to complete documents before sending to [employer] (home wound vac company). Pt disclosed that she will be residing with her son here in [place], [place]: [address] [place] [address]. [place], [place]. [id number]. CM will send referral for home health to [facility]. CM will continue to follow pt. Care Management Interventions PCP Verified by CM: Yes Mode of Transport at Discharge: Other (see comment) Transition of Care Consult (CM Consult): Discharge Planning Discharge Durable Medical Equipment: Yes(hme wound vac) Physical Therapy Consult: No 
Occupational Therapy Consult: No 
Current Support Network: Lives with Spouse, Own Home Confirm Follow Up Transport: Family Freedom of Choice Offered: Yes Discharge Location Discharge Placement: Home with home health LeCHINYERE Pérez  
[phone]

## 2018-12-03 NOTE — BRIEF OP NOTE
BRIEF OPERATIVE NOTE Date of Procedure: 12/3/2018 Preoperative Diagnosis: right breast hematoma with abscess Postoperative Diagnosis: right breast hematoma with abscess Procedure(s): 
INCISION AND DRAINAGE right breast 
Surgeon(s) and Role: Sai Cardenas MD - Primary Surgical Assistant: none Surgical Staff: 
Circ-1: Neale Schilder, RN Scrub Tech-1: Dann Christie No case tracking events are documented in the log. Anesthesia: General  
Estimated Blood Loss: < 10 cc active blood loss Specimens:  
ID Type Source Tests Collected by Time Destination 1 : right breast wound and hematoma Preservative Breast  Adam Yeager MD 12/3/2018 0136 Pathology 1 : right breast wound Wound Breast CULTURE, ANAEROBIC, CULTURE, WOUND W GRAM STAIN Adam Yeager MD 12/3/2018 7200 Microbiology Findings: massive partially liquified hematoma with full thickness skin necrosis and abscess Complications: none Implants: * No implants in log * Packed with 1/4 str Dakin's moistened gauze

## 2018-12-03 NOTE — WOUND CARE
Wound care nurse consult from Dr Bourgeois Session stating \" wound vac right breast\". Patient is a 73 y/o CF admitted for right breast abscess and was taken to OR early, early this am to have an I&D. Dr Hermes Garcia has taken over care of patient and he met this nurse at bedside to ascess wound. Past Medical History:  
Diagnosis Date  Arrhythmia Atrial Fibrillation  CAD (coronary artery disease)  Diabetes ([facility] [address]  Hypertension Patient is visiting her son in this area and is from [address] [place] [address]. Per Dr Hermes Garcia - too soon to vac wound - drainage is moderate brown, mild odor and wound base is not well granulated enough. There is a skin bridge between to open incisions but wound was measured as a whole. MD recommends BID dressing changes using Dakins solution wet to dry packing Wound Breast Right;Lateral (Active) DRESSING STATUS Removed 12/3/2018 10:58 AM  
DRESSING TYPE Packing 12/3/2018 10:58 AM  
Incision site well approximated? No 12/3/2018 10:58 AM  
Non-Pressure Injury Full thickness (subcut/muscle) 12/3/2018 10:58 AM  
Wound Length (cm) 2.3 cm 12/3/2018 10:58 AM  
Wound Width (cm) 7.5 cm 12/3/2018 10:58 AM  
Wound Depth (cm) 4 12/3/2018 10:58 AM  
Wound Surface area (cm^2) 17.25 cm^2 12/3/2018 10:58 AM  
Condition of Base Adipose exposed;Granulation;Slough 12/3/2018 10:58 AM  
Condition of Edges Rolled/curled 12/3/2018 10:58 AM  
Tissue Type Other (comment); Pink 12/3/2018 10:58 AM  
Direction of Undermining 11    oclock;12    oclock;1    oclock;2    oclock 12/3/2018 10:58 AM  
Depth of Undermining (cm) 10 12/3/2018 10:58 AM  
Drainage Amount  Moderate 12/3/2018 10:58 AM  
Drainage Color Brown 12/3/2018 10:58 AM  
Wound Odor None 12/3/2018  3:45 AM  
Cleansing and Cleansing Agents  Normal saline 12/3/2018 10:58 AM  
Dressing Type Applied Wet to dry;Packing 12/3/2018 10:58 AM  
Procedure Tolerated Well 12/3/2018 10:58 AM  
Number of days: 0 Wound Care: Right lateral breast: BID, cleanse wound with 1/4 Dakins moist 4x4's. Wipe wound clean of old drainage and debris. Pack wound with a full roll of Dakins moist Kerlix. Make sure undermining is packed! Cover with dry 4x4's, x2 Exudry pads secured with tape. Ace bandage wrap to bind her. Plan: WC nurse will change dressing tomorrow am and follow patient with dr Ivelisse Clarke until wound vac can be applied. Jennyfer Black RN, [facility] Irene Akins

## 2018-12-04 LAB
ANION GAP SERPL CALC-SCNC: 6 MMOL/L (ref 5–15)
BACTERIA SPEC CULT: NORMAL
BUN SERPL-MCNC: 17 MG/DL (ref 6–20)
BUN/CREAT SERPL: 16 (ref 12–20)
CALCIUM SERPL-MCNC: 7.7 MG/DL (ref 8.5–10.1)
CC UR VC: NORMAL
CHLORIDE SERPL-SCNC: 113 MMOL/L (ref 97–108)
CO2 SERPL-SCNC: 22 MMOL/L (ref 21–32)
CREAT SERPL-MCNC: 1.04 MG/DL (ref 0.55–1.02)
GLUCOSE BLD STRIP.AUTO-MCNC: 138 MG/DL (ref 65–100)
GLUCOSE BLD STRIP.AUTO-MCNC: 175 MG/DL (ref 65–100)
GLUCOSE BLD STRIP.AUTO-MCNC: 181 MG/DL (ref 65–100)
GLUCOSE BLD STRIP.AUTO-MCNC: 215 MG/DL (ref 65–100)
GLUCOSE SERPL-MCNC: 102 MG/DL (ref 65–100)
POTASSIUM SERPL-SCNC: 3.4 MMOL/L (ref 3.5–5.1)
SERVICE CMNT-IMP: ABNORMAL
SERVICE CMNT-IMP: NORMAL
SODIUM SERPL-SCNC: 141 MMOL/L (ref 136–145)

## 2018-12-04 PROCEDURE — 65270000029 HC RM PRIVATE

## 2018-12-04 PROCEDURE — 80048 BASIC METABOLIC PNL TOTAL CA: CPT

## 2018-12-04 PROCEDURE — 80202 ASSAY OF VANCOMYCIN: CPT

## 2018-12-04 PROCEDURE — 74011250636 HC RX REV CODE- 250/636: Performed by: SURGERY

## 2018-12-04 PROCEDURE — 77030019607 HC DSG BURN S&N -A

## 2018-12-04 PROCEDURE — 82962 GLUCOSE BLOOD TEST: CPT

## 2018-12-04 PROCEDURE — 74011636637 HC RX REV CODE- 636/637: Performed by: SURGERY

## 2018-12-04 PROCEDURE — 36415 COLL VENOUS BLD VENIPUNCTURE: CPT

## 2018-12-04 PROCEDURE — 74011000258 HC RX REV CODE- 258: Performed by: SURGERY

## 2018-12-04 RX ADMIN — INSULIN LISPRO 4 UNITS: 100 INJECTION, SOLUTION INTRAVENOUS; SUBCUTANEOUS at 12:24

## 2018-12-04 RX ADMIN — Medication 10 ML: at 06:14

## 2018-12-04 RX ADMIN — PIPERACILLIN SODIUM,TAZOBACTAM SODIUM 3.38 G: 3; .375 INJECTION, POWDER, FOR SOLUTION INTRAVENOUS at 03:53

## 2018-12-04 RX ADMIN — PIPERACILLIN SODIUM,TAZOBACTAM SODIUM 3.38 G: 3; .375 INJECTION, POWDER, FOR SOLUTION INTRAVENOUS at 12:24

## 2018-12-04 RX ADMIN — DAKIN'S SOLUTION 0.125% (QUARTER STRENGTH): 0.12 SOLUTION at 12:25

## 2018-12-04 RX ADMIN — VANCOMYCIN HYDROCHLORIDE 1500 MG: 10 INJECTION, POWDER, LYOPHILIZED, FOR SOLUTION INTRAVENOUS at 22:13

## 2018-12-04 RX ADMIN — PIPERACILLIN SODIUM,TAZOBACTAM SODIUM 3.38 G: 3; .375 INJECTION, POWDER, FOR SOLUTION INTRAVENOUS at 18:29

## 2018-12-04 RX ADMIN — SODIUM CHLORIDE 25 ML/HR: 900 INJECTION, SOLUTION INTRAVENOUS at 18:34

## 2018-12-04 RX ADMIN — DAKIN'S SOLUTION 0.125% (QUARTER STRENGTH): 0.12 SOLUTION at 23:03

## 2018-12-04 RX ADMIN — Medication 10 ML: at 22:16

## 2018-12-04 NOTE — PROGRESS NOTES
CM has received completed documents, for pt's home vac, [facility] has send documents to [facility] for processing. Pt has been accepted to [facility] CHINYERE Oakley CM 
[phone]

## 2018-12-04 NOTE — PROGRESS NOTES
MIDLINE Insertion Procedure  Note:  
 
Procedure explained to  pt  along with risks and benefits. Procedure teaching completed. Pre procedure assessment done. Pt has infection to right breast . Assessed left arm vein. Maximum sterile barrier precautions observed throughout procedure. Lidocaine 1 %  3.0   ml sc injected to site prior to access the vein . Cannulated   cephalic   vein using ultrasound guidance. Inserted  4   Fr. Single   lumen midline to   left    arm. Blood return verified and  flushed with 20ml normal saline  to  port. Sterile dressing applied with biopatch, statLock and occlusive dressing as per protocol. Curos cap applied to port. Patient tolerated procedure well with minimal blood loss. Reason for access : Reliable IV Access / limited vascular access Complications related to insertion : None This midline to be removed on or before  1/2/2019 See nursing message  for midline reminders Inserted by : Alexandra Aponte RN. AYALA. CMSRN. PICC Nurse Assisted by : Andrea Barclay RN PICC Nurse Total Length : 13  cm External Length :  0     cm Arm circumference :    39   cm Catheter occupies   13   % of vein. Type of Midline:  Bard [employer] Midline Ref#:  [id number] Lot#:   [id number] Expiration Date:    2020-03-31 Alexandra Aponte RN. AYALA. CMSRN. PICC nurse.  Vascular Access Team

## 2018-12-04 NOTE — PROGRESS NOTES
Admit Date: 2018 POD 1 Surgery Procedure:  Procedure(s): 
INCISION AND DRAINAGE right breast 
 
 
Assessment:  
Principal Problem: 
  Breast abscess (2018) 1. Feels better 2. Looked at wound yesterday 3. F/u cultures Plan/Recommendations/Medical Decision Makin.  continuedressing changes 2. Wound care for VAC-will need at discharge 3. Continue abx 4. May need to divide bridge of skin Subjective:  
 
Patient has complaints of pain but improving Objective:  
 
Blood pressure 114/48, pulse 69, temperature 97.3 °F (36.3 °C), resp. rate 17, SpO2 100 %, not currently breastfeeding. Temp (24hrs), Av.8 °F (36.6 °C), Min:97.3 °F (36.3 °C), Max:98.5 °F (36.9 °C) Physical Exam:  LUNG: clear to auscultation bilaterally, BREAST:  Right with dressing CDI, HEART: regular rate and rhythm, S1, S2 normal, no murmur, click, rub or gallop, ABDOMEN: soft, non-tender. Bowel sounds normal. No masses,  no organomegaly Labs:  
Recent Results (from the past 48 hour(s)) CULTURE, BLOOD, PAIRED Collection Time: 18  7:10 PM  
Result Value Ref Range Special Requests: NO SPECIAL REQUESTS Culture result: NO GROWTH AFTER 11 HOURS METABOLIC PANEL, COMPREHENSIVE Collection Time: 18  7:15 PM  
Result Value Ref Range Sodium 137 136 - 145 mmol/L Potassium 3.7 3.5 - 5.1 mmol/L Chloride 107 97 - 108 mmol/L  
 CO2 18 (L) 21 - 32 mmol/L Anion gap 12 5 - 15 mmol/L Glucose 184 (H) 65 - 100 mg/dL BUN 23 (H) 6 - 20 MG/DL Creatinine 1.32 (H) 0.55 - 1.02 MG/DL  
 BUN/Creatinine ratio 17 12 - 20 GFR est AA 48 (L) >60 ml/min/1.73m2 GFR est non-AA 40 (L) >60 ml/min/1.73m2 Calcium 8.7 8.5 - 10.1 MG/DL Bilirubin, total 2.9 (H) 0.2 - 1.0 MG/DL  
 ALT (SGPT) 15 12 - 78 U/L  
 AST (SGOT) 15 15 - 37 U/L Alk. phosphatase 103 45 - 117 U/L Protein, total 6.8 6.4 - 8.2 g/dL Albumin 2.7 (L) 3.5 - 5.0 g/dL Globulin 4.1 (H) 2.0 - 4.0 g/dL A-G Ratio 0.7 (L) 1.1 - 2.2 LACTIC ACID Collection Time: 12/02/18  7:15 PM  
Result Value Ref Range Lactic acid 1.8 0.4 - 2.0 MMOL/L  
CBC WITH AUTOMATED DIFF Collection Time: 12/02/18  7:15 PM  
Result Value Ref Range WBC 4.3 3.6 - 11.0 K/uL  
 RBC 3.57 (L) 3.80 - 5.20 M/uL  
 HGB 11.0 (L) 11.5 - 16.0 g/dL HCT 33.9 (L) 35.0 - 47.0 % MCV 95.0 80.0 - 99.0 FL  
 MCH 30.8 26.0 - 34.0 PG  
 MCHC 32.4 30.0 - 36.5 g/dL  
 RDW 16.4 (H) 11.5 - 14.5 % PLATELET 356 790 - 484 K/uL MPV 10.2 8.9 - 12.9 FL  
 NRBC 0.0 0  WBC ABSOLUTE NRBC 0.00 0.00 - 0.01 K/uL NEUTROPHILS 85 (H) 32 - 75 % LYMPHOCYTES 8 (L) 12 - 49 % MONOCYTES 6 5 - 13 % EOSINOPHILS 0 0 - 7 % BASOPHILS 1 0 - 1 % IMMATURE GRANULOCYTES 0 0.0 - 0.5 % ABS. NEUTROPHILS 3.7 1.8 - 8.0 K/UL  
 ABS. LYMPHOCYTES 0.3 (L) 0.8 - 3.5 K/UL  
 ABS. MONOCYTES 0.3 0.0 - 1.0 K/UL  
 ABS. EOSINOPHILS 0.0 0.0 - 0.4 K/UL  
 ABS. BASOPHILS 0.0 0.0 - 0.1 K/UL  
 ABS. IMM. GRANS. 0.0 0.00 - 0.04 K/UL  
 DF AUTOMATED    
 RBC COMMENTS ANISOCYTOSIS 
1+ URINALYSIS W/MICROSCOPIC Collection Time: 12/02/18 11:15 PM  
Result Value Ref Range Color YELLOW/STRAW Appearance CLOUDY (A) CLEAR Specific gravity >1.030 (H) 1.003 - 1.030  
 pH (UA) 5.5 5.0 - 8.0 Protein 30 (A) NEG mg/dL Glucose NEGATIVE  NEG mg/dL Ketone TRACE (A) NEG mg/dL Bilirubin NEGATIVE  NEG Blood NEGATIVE  NEG Urobilinogen 1.0 0.2 - 1.0 EU/dL Nitrites NEGATIVE  NEG Leukocyte Esterase MODERATE (A) NEG    
 WBC 20-50 0 - 4 /hpf  
 RBC 0-5 0 - 5 /hpf Epithelial cells MODERATE (A) FEW /lpf Bacteria 1+ (A) NEG /hpf CULTURE, WOUND W GRAM STAIN Collection Time: 12/03/18  1:40 AM  
Result Value Ref Range Special Requests: NO SPECIAL REQUESTS    
 GRAM STAIN 2+ WBCS SEEN    
 GRAM STAIN FEW GRAM NEGATIVE RODS Culture result: PENDING   
GLUCOSE, POC  Collection Time: 12/03/18  2:05 AM  
 Result Value Ref Range Glucose (POC) 136 (H) 65 - 100 mg/dL Performed by [employer]   
CBC WITH AUTOMATED DIFF Collection Time: 12/03/18  5:27 AM  
Result Value Ref Range WBC 3.6 3.6 - 11.0 K/uL  
 RBC 3.00 (L) 3.80 - 5.20 M/uL HGB 9.2 (L) 11.5 - 16.0 g/dL HCT 28.3 (L) 35.0 - 47.0 % MCV 94.3 80.0 - 99.0 FL  
 MCH 30.7 26.0 - 34.0 PG  
 MCHC 32.5 30.0 - 36.5 g/dL  
 RDW 16.2 (H) 11.5 - 14.5 % PLATELET 570 977 - 898 K/uL MPV 10.3 8.9 - 12.9 FL  
 NRBC 0.0 0  WBC ABSOLUTE NRBC 0.00 0.00 - 0.01 K/uL NEUTROPHILS 80 (H) 32 - 75 % LYMPHOCYTES 11 (L) 12 - 49 % MONOCYTES 7 5 - 13 % EOSINOPHILS 0 0 - 7 % BASOPHILS 0 0 - 1 % IMMATURE GRANULOCYTES 1 (H) 0.0 - 0.5 % ABS. NEUTROPHILS 2.9 1.8 - 8.0 K/UL  
 ABS. LYMPHOCYTES 0.4 (L) 0.8 - 3.5 K/UL  
 ABS. MONOCYTES 0.3 0.0 - 1.0 K/UL  
 ABS. EOSINOPHILS 0.0 0.0 - 0.4 K/UL  
 ABS. BASOPHILS 0.0 0.0 - 0.1 K/UL  
 ABS. IMM. GRANS. 0.0 0.00 - 0.04 K/UL  
 DF AUTOMATED    
GLUCOSE, POC Collection Time: 12/03/18  8:42 AM  
Result Value Ref Range Glucose (POC) 129 (H) 65 - 100 mg/dL Performed by [employer] GLUCOSE, POC Collection Time: 12/03/18 10:56 AM  
Result Value Ref Range Glucose (POC) 170 (H) 65 - 100 mg/dL Performed by [employer] GLUCOSE, POC Collection Time: 12/03/18  4:34 PM  
Result Value Ref Range Glucose (POC) 136 (H) 65 - 100 mg/dL Performed by Rajesh Ornelas GLUCOSE, POC Collection Time: 12/03/18  9:56 PM  
Result Value Ref Range Glucose (POC) 176 (H) 65 - 100 mg/dL Performed by Melissa Gibbons METABOLIC PANEL, BASIC Collection Time: 12/04/18  4:46 AM  
Result Value Ref Range Sodium 141 136 - 145 mmol/L Potassium 3.4 (L) 3.5 - 5.1 mmol/L Chloride 113 (H) 97 - 108 mmol/L  
 CO2 22 21 - 32 mmol/L Anion gap 6 5 - 15 mmol/L Glucose 102 (H) 65 - 100 mg/dL BUN 17 6 - 20 MG/DL  Creatinine 1.04 (H) 0.55 - 1.02 MG/DL  
 BUN/Creatinine ratio 16 12 - 20 GFR est AA >60 >60 ml/min/1.73m2 GFR est non-AA 52 (L) >60 ml/min/1.73m2 Calcium 7.7 (L) 8.5 - 10.1 MG/DL Data Review images and reports reviewed

## 2018-12-04 NOTE — PROGRESS NOTES
1600 bedside shift report received from [employer]. Writer assumed care of patient.  
 
1905 bedside shift report given to [facility]

## 2018-12-05 VITALS
OXYGEN SATURATION: 99 % | SYSTOLIC BLOOD PRESSURE: 133 MMHG | DIASTOLIC BLOOD PRESSURE: 72 MMHG | HEART RATE: 65 BPM | TEMPERATURE: 97.8 F | RESPIRATION RATE: 16 BRPM

## 2018-12-05 LAB
ANION GAP SERPL CALC-SCNC: 8 MMOL/L (ref 5–15)
BACTERIA SPEC CULT: ABNORMAL
BUN SERPL-MCNC: 14 MG/DL (ref 6–20)
BUN/CREAT SERPL: 16 (ref 12–20)
CALCIUM SERPL-MCNC: 7.8 MG/DL (ref 8.5–10.1)
CHLORIDE SERPL-SCNC: 117 MMOL/L (ref 97–108)
CO2 SERPL-SCNC: 21 MMOL/L (ref 21–32)
CREAT SERPL-MCNC: 0.87 MG/DL (ref 0.55–1.02)
DATE LAST DOSE: ABNORMAL
GLUCOSE BLD STRIP.AUTO-MCNC: 127 MG/DL (ref 65–100)
GLUCOSE SERPL-MCNC: 124 MG/DL (ref 65–100)
GRAM STN SPEC: ABNORMAL
GRAM STN SPEC: ABNORMAL
POTASSIUM SERPL-SCNC: 3.5 MMOL/L (ref 3.5–5.1)
REPORTED DOSE,DOSE: ABNORMAL UNITS
REPORTED DOSE/TIME,TMG: 2100
SERVICE CMNT-IMP: ABNORMAL
SODIUM SERPL-SCNC: 146 MMOL/L (ref 136–145)
VANCOMYCIN TROUGH SERPL-MCNC: 11.9 UG/ML (ref 5–10)

## 2018-12-05 PROCEDURE — 74011000258 HC RX REV CODE- 258: Performed by: SURGERY

## 2018-12-05 PROCEDURE — 74011250636 HC RX REV CODE- 250/636: Performed by: SURGERY

## 2018-12-05 PROCEDURE — 80048 BASIC METABOLIC PNL TOTAL CA: CPT

## 2018-12-05 PROCEDURE — 97606 NEG PRS WND THER DME>50 SQCM: CPT

## 2018-12-05 PROCEDURE — 82962 GLUCOSE BLOOD TEST: CPT

## 2018-12-05 PROCEDURE — 36415 COLL VENOUS BLD VENIPUNCTURE: CPT

## 2018-12-05 RX ORDER — OXYCODONE AND ACETAMINOPHEN 5; 325 MG/1; MG/1
1 TABLET ORAL
Qty: 20 TAB | Refills: 0 | Status: SHIPPED | OUTPATIENT
Start: 2018-12-05 | End: 2018-12-10

## 2018-12-05 RX ORDER — AMOXICILLIN AND CLAVULANATE POTASSIUM 875; 125 MG/1; MG/1
1 TABLET, FILM COATED ORAL EVERY 12 HOURS
Qty: 20 TAB | Refills: 0 | Status: SHIPPED | OUTPATIENT
Start: 2018-12-05 | End: 2018-12-15

## 2018-12-05 RX ADMIN — PIPERACILLIN SODIUM,TAZOBACTAM SODIUM 3.38 G: 3; .375 INJECTION, POWDER, FOR SOLUTION INTRAVENOUS at 04:03

## 2018-12-05 RX ADMIN — Medication 10 ML: at 06:09

## 2018-12-05 NOTE — DISCHARGE INSTRUCTIONS
Discharge Instructions:  Breast abscess  Dr. Bryan Jamil    Call for appointment for follow up in 10 days [phone]    Activity:    Walk regularly. You may resume driving in 24 hours unless still requiring narcotics for pain. It is ok to use the arm on side of surgery but do not over do it. Work:    You may return to work in 3-7 days to light activity. No lifting more than 10 pounds for three weeks. Diet:    You may resume normal diet after 24 hours. Anesthesia and narcotics may cause nausea and vomiting. If persistent please call the office. Wound Care:    Home health with wound VAC. Change VAC every 3 days    Medications:    Resume home medications as indicated on the Medical Reconciliation form. Aspirin, Coumadin, and Plavix can be restarted on post operative day 2 if you were taking them preoperatively. Pain medications:  Non steroidal antiinflammatories seem to work best for post surgical pain. Try these first as prescribed. A narcotic prescription will also be given for breakthrough pain. Over the counter stool softeners and laxatives may be used if needed. Do not hesitate to call with questions or concerns.

## 2018-12-05 NOTE — DISCHARGE SUMMARY
Physician Discharge Summary     Patient ID:  Micki Paulson  [id number]  72 y.o.  1945    Allergies: Patient has no known allergies. Admit Date: 12/2/2018    Discharge Date: 12/5/2018    * Admission Diagnoses: Breast abscess [N61.1]    * Discharge Diagnoses:    Hospital Problems as of 12/5/2018 Date Reviewed: 12/3/2018          Codes Class Noted - Resolved POA    * (Principal) Breast abscess ICD-10-CM: N61.1  ICD-9-CM: 611.0  12/2/2018 - Present Yes               Admission Condition: Poor    * Discharge Condition: improved    * Procedures: Procedure(s):  INCISION AND DRAINAGE right breast    * Hospital Course:   Normal hospital course for this procedure. Consults: None    Significant Diagnostic Studies: wound culture with enterococcus    * Disposition: home with home health for wound VAC    Discharge Medications:   Current Discharge Medication List          * Follow-up Care/Patient Instructions:   Activity: Activity as tolerated  Diet: Cardiac Diet  Wound Care: wound VAC change every 3 days    Follow-up Information    None       Follow-up tests/labs tbd    Signed:  Ras Sagastume MD  12/5/2018  8:39 AM

## 2018-12-05 NOTE — WOUND CARE
Wound care nurse met with dr Myla Cisse at bedside this am to assess wound. Dr Mason Soria did some light debridement of wound and opened skin bridge out of the way to allow better packing of wound. MD ordered home wound VAC dressing to be applied today and patient to be D/C'd home/her sons house if CM has everything set up. Wound Breast Right;Lateral (Active) DRESSING STATUS Removed 12/5/2018  9:34 AM  
DRESSING TYPE Packing 12/5/2018  9:34 AM  
Incision site well approximated? No 12/5/2018  9:34 AM  
Non-Pressure Injury Full thickness (subcut/muscle) 12/5/2018  9:34 AM  
Wound Length (cm) 2.3 cm 12/3/2018 10:58 AM  
Wound Width (cm) 7.5 cm 12/3/2018 10:58 AM  
Wound Depth (cm) 4 12/3/2018 10:58 AM  
Wound Surface area (cm^2) 17.25 cm^2 12/3/2018 10:58 AM  
Condition of Base Granulation; Adipose exposed;Slough 12/5/2018  9:34 AM  
Condition of Edges Rolled/curled 12/5/2018  9:34 AM  
Tissue Type Red;Pink;Yellow 12/5/2018  9:34 AM  
Tissue Type Percent Pink 30 12/5/2018  9:34 AM  
Tissue Type Percent Red 60 12/5/2018  9:34 AM  
Tissue Type Percent Yellow 10 12/5/2018  9:34 AM  
Direction of Undermining 11    oclock;12    oclock;1    oclock;2    oclock 12/3/2018 10:58 AM  
Depth of Undermining (cm) 10 12/3/2018 10:58 AM  
Drainage Amount  Small  12/5/2018  9:34 AM  
Drainage Color Serosanguinous 12/5/2018  9:34 AM  
Wound Odor None 12/5/2018  9:34 AM  
Periwound Skin Condition Indurated; Intact 12/5/2018  9:34 AM  
Cleansing and Cleansing Agents  Sodium hypochlorite 25 % 12/5/2018  9:34 AM  
Dressing Type Applied Vacuum dressing 12/5/2018  9:34 AM  
Procedure Tolerated Well 12/5/2018  9:34 AM  
Number of days: 2 Wound VAC application: 
- wound and iris-wound skin cleaned and prepped 
- x2 pieces of black foam placed in wound bed 
- occlusively covered with drap and NPWT set to -125 mm/hg, continuous as ordered.  
 
Patient and her relativels in room shown the home wound VAC set up, how to change a cannister and how to charge wound VAC. Plan: Patient to be d/c'd to her sons house with [facility] for aprox a week and then transition to her home in [place] with wound VAC and [facility] there. Patient will be continued to be followed by Dr Taz Dotson for wound healing.  
 
Arnoldo Brink RN, [employer]

## 2018-12-05 NOTE — PROGRESS NOTES
CM has completed d/c assessment with pt. Pt will d/c on today and will transition to her sons home with home health: [id number] and home wound vac. CM has completed the need of the pt at this time Care Management Interventions PCP Verified by CM: Yes Mode of Transport at Discharge: Other (see comment) Transition of Care Consult (CM Consult): Home Health, Infusion Center([facility] ) [facility] - INPATIENT: No 
Reason Outside [facility] Insurance Agency Chosen: Unable to staff case Discharge Durable Medical Equipment: Yes(home wound vac ) Physical Therapy Consult: No 
Occupational Therapy Consult: No 
Speech Therapy Consult: No 
Current Support Network: Lives with Spouse, Own Home Confirm Follow Up Transport: Family Plan discussed with Pt/Family/Caregiver: Yes Freedom of Choice Offered: Yes Discharge Location Discharge Placement: Home with home health([facility] ) CHINYERE Talavera  
[phone]

## 2018-12-05 NOTE — PROGRESS NOTES
Dr. Gemini Coe at bedside. Physician to return later this morning to look at wound to right breast and decide if pt needs to go to OR or not. Physician requests 18g & 25g needles, 10 blade, and 1% lidocaine with epi be at bedside. Pt also to be NPO until he returns incase pt needs to go to OR.

## 2018-12-05 NOTE — PROGRESS NOTES
Admit Date: 2018 POD 2 Surgery Procedure:  Procedure(s): 
INCISION AND DRAINAGE right breast 
 
 
Assessment:  
Principal Problem: 
  Breast abscess (2018) 1. Feels better 2. Looked at wound yesterday 3. F/u cultures  enterococcus Plan/Recommendations/Medical Decision Makin.  continuedressing changes 2. Wound VAC placed today with wound care 3. Continue abx 4. Discharge on abx and wound VAC Subjective:  
 
Patient has complaints of pain but improving Objective:  
 
Blood pressure 133/72, pulse 65, temperature 97.8 °F (36.6 °C), resp. rate 16, SpO2 99 %, not currently breastfeeding. Temp (24hrs), Av.1 °F (36.7 °C), Min:97.8 °F (36.6 °C), Max:98.4 °F (36.9 °C) Physical Exam:  LUNG: clear to auscultation bilaterally, BREAST:  Right with large wound, divided skin bridge, some necrotic tissue scraped out (non surgical debridement), HEART: regular rate and rhythm, S1, S2 normal, no murmur, click, rub or gallop, ABDOMEN: soft, non-tender. Bowel sounds normal. No masses,  no organomegaly Labs:  
Recent Results (from the past 48 hour(s)) GLUCOSE, POC Collection Time: 18  8:42 AM  
Result Value Ref Range Glucose (POC) 129 (H) 65 - 100 mg/dL Performed by Aixa Javier GLUCOSE, POC Collection Time: 18 10:56 AM  
Result Value Ref Range Glucose (POC) 170 (H) 65 - 100 mg/dL Performed by Aixa Javier GLUCOSE, POC Collection Time: 18  4:34 PM  
Result Value Ref Range Glucose (POC) 136 (H) 65 - 100 mg/dL Performed by Michael Prieto GLUCOSE, POC Collection Time: 18  9:56 PM  
Result Value Ref Range Glucose (POC) 176 (H) 65 - 100 mg/dL Performed by Valentino Overton METABOLIC PANEL, BASIC Collection Time: 18  4:46 AM  
Result Value Ref Range Sodium 141 136 - 145 mmol/L Potassium 3.4 (L) 3.5 - 5.1 mmol/L  Chloride 113 (H) 97 - 108 mmol/L  
 CO2 22 21 - 32 mmol/L  
 Anion gap 6 5 - 15 mmol/L Glucose 102 (H) 65 - 100 mg/dL BUN 17 6 - 20 MG/DL Creatinine 1.04 (H) 0.55 - 1.02 MG/DL  
 BUN/Creatinine ratio 16 12 - 20 GFR est AA >60 >60 ml/min/1.73m2 GFR est non-AA 52 (L) >60 ml/min/1.73m2 Calcium 7.7 (L) 8.5 - 10.1 MG/DL  
GLUCOSE, POC Collection Time: 12/04/18  8:14 AM  
Result Value Ref Range Glucose (POC) 175 (H) 65 - 100 mg/dL Performed by Samson Andino* GLUCOSE, POC Collection Time: 12/04/18 11:19 AM  
Result Value Ref Range Glucose (POC) 215 (H) 65 - 100 mg/dL Performed by Chacho Silver (PCT) GLUCOSE, POC Collection Time: 12/04/18  4:59 PM  
Result Value Ref Range Glucose (POC) 138 (H) 65 - 100 mg/dL Performed by Samson Andino* Robert Dash Collection Time: 12/04/18 10:13 PM  
Result Value Ref Range Vancomycin,trough 11.9 (H) 5.0 - 10.0 ug/mL Reported dose date: [id number] Reported dose time: 2100 Reported dose: 1500 MG UNITS  
GLUCOSE, POC Collection Time: 12/04/18 10:35 PM  
Result Value Ref Range Glucose (POC) 181 (H) 65 - 100 mg/dL Performed by Yariel Flores (PCT) METABOLIC PANEL, BASIC Collection Time: 12/05/18  4:10 AM  
Result Value Ref Range Sodium 146 (H) 136 - 145 mmol/L Potassium 3.5 3.5 - 5.1 mmol/L Chloride 117 (H) 97 - 108 mmol/L  
 CO2 21 21 - 32 mmol/L Anion gap 8 5 - 15 mmol/L Glucose 124 (H) 65 - 100 mg/dL BUN 14 6 - 20 MG/DL Creatinine 0.87 0.55 - 1.02 MG/DL  
 BUN/Creatinine ratio 16 12 - 20 GFR est AA >60 >60 ml/min/1.73m2 GFR est non-AA >60 >60 ml/min/1.73m2 Calcium 7.8 (L) 8.5 - 10.1 MG/DL  
GLUCOSE, POC Collection Time: 12/05/18  7:52 AM  
Result Value Ref Range Glucose (POC) 127 (H) 65 - 100 mg/dL Performed by Carly Coleman Data Review images and reports reviewed

## 2018-12-05 NOTE — PROGRESS NOTES
Documented 12/5/18. Patient was visited by a [facility] Medic Partner Volunteer in General Surgery Unit on 12/4/2018. 
  
Documented by Rev. Ruth Vyas, [facility] paging service: [phone]

## 2018-12-07 ENCOUNTER — TELEPHONE (OUTPATIENT)
Dept: SURGERY | Age: 73
End: 2018-12-07

## 2018-12-07 LAB
BACTERIA SPEC CULT: NORMAL
SERVICE CMNT-IMP: NORMAL

## 2018-12-07 NOTE — TELEPHONE ENCOUNTER
[facility], [place], operated by [facility] needs orders for patient's wound vac. Fax to [phone] phone #. Please advise.

## 2018-12-11 NOTE — TELEPHONE ENCOUNTER
Jose Jenkins MD   You 4 days ago      I did them at the hospital and they should have it   Please reach out to  at hospital     John Kearns MD FACS      Spoke with Chad Second  she stated she would take care of the orders for home health.

## 2018-12-12 ENCOUNTER — OFFICE VISIT (OUTPATIENT)
Dept: SURGERY | Age: 73
End: 2018-12-12

## 2018-12-12 VITALS
SYSTOLIC BLOOD PRESSURE: 161 MMHG | RESPIRATION RATE: 20 BRPM | TEMPERATURE: 97.1 F | OXYGEN SATURATION: 97 % | BODY MASS INDEX: 37 KG/M2 | WEIGHT: 230.2 LBS | HEIGHT: 66 IN | DIASTOLIC BLOOD PRESSURE: 106 MMHG | HEART RATE: 76 BPM

## 2018-12-12 DIAGNOSIS — N61.1 BREAST ABSCESS: Primary | ICD-10-CM

## 2018-12-12 PROBLEM — E66.01 SEVERE OBESITY (HCC): Status: ACTIVE | Noted: 2018-12-12

## 2018-12-12 NOTE — PROGRESS NOTES
Surgery  Follow up  Procedure: incisional debridement of right breast abscess/hematoma by Dr Juan Albright date:  12/3  Path:  benign    S I feel fine,     Visit Vitals  BP (!) 161/106 (BP 1 Location: Right arm, BP Patient Position: Sitting)   Pulse 76   Temp 97.1 °F (36.2 °C)   Resp 20   Ht 5' 6\" (1.676 m)   Wt 104.4 kg (230 lb 3.2 oz)   SpO2 97%   BMI 37.16 kg/m²       O Incisions healing well without infection   Wound with excellent granulation tissue except medially deep in wound with good granulation tissue  Wound VAC changed in office today with our supplies    A/P Doing well   Continue wound VAC   RTC 3 weeks    Emmy Jacome MD FACS

## 2018-12-12 NOTE — PROGRESS NOTES
Chief Complaint   Patient presents with    Surgical Follow-up      Incision and drainage of right breast infected hematoma with abscess. 12/03/18  Dr. Vianey Hatch       1. Have you been to the ER, urgent care clinic since your last visit? Hospitalized since your last visit? No    2. Have you seen or consulted any other health care providers outside of the [facility] since your last visit? Include any pap smears or colon screening. No     Discussed advanced directive. Patient states that she does have an advanced directive.

## 2018-12-13 DIAGNOSIS — N61.1 BREAST ABSCESS: Primary | ICD-10-CM

## 2018-12-14 ENCOUNTER — HOME HEALTH ADMISSION (OUTPATIENT)
Dept: HOME HEALTH SERVICES | Facility: HOME HEALTH | Age: 73
End: 2018-12-14
Payer: MEDICARE

## 2018-12-15 ENCOUNTER — HOME CARE VISIT (OUTPATIENT)
Dept: HOME HEALTH SERVICES | Facility: HOME HEALTH | Age: 73
End: 2018-12-15
Payer: MEDICARE

## 2018-12-15 PROCEDURE — 3331090001 HH PPS REVENUE CREDIT

## 2018-12-15 PROCEDURE — 3331090002 HH PPS REVENUE DEBIT

## 2018-12-15 PROCEDURE — 400013 HH SOC

## 2018-12-15 PROCEDURE — G0299 HHS/HOSPICE OF RN EA 15 MIN: HCPCS

## 2018-12-16 VITALS
RESPIRATION RATE: 20 BRPM | SYSTOLIC BLOOD PRESSURE: 128 MMHG | OXYGEN SATURATION: 97 % | DIASTOLIC BLOOD PRESSURE: 86 MMHG | TEMPERATURE: 96.4 F | HEART RATE: 86 BPM

## 2018-12-16 PROCEDURE — 3331090001 HH PPS REVENUE CREDIT

## 2018-12-16 PROCEDURE — 3331090002 HH PPS REVENUE DEBIT

## 2018-12-17 ENCOUNTER — HOME CARE VISIT (OUTPATIENT)
Dept: HOME HEALTH SERVICES | Facility: HOME HEALTH | Age: 73
End: 2018-12-17
Payer: MEDICARE

## 2018-12-17 PROCEDURE — G0300 HHS/HOSPICE OF LPN EA 15 MIN: HCPCS

## 2018-12-17 PROCEDURE — 3331090002 HH PPS REVENUE DEBIT

## 2018-12-17 PROCEDURE — 3331090001 HH PPS REVENUE CREDIT

## 2018-12-18 PROCEDURE — 3331090001 HH PPS REVENUE CREDIT

## 2018-12-18 PROCEDURE — 3331090002 HH PPS REVENUE DEBIT

## 2018-12-19 ENCOUNTER — HOME CARE VISIT (OUTPATIENT)
Dept: SCHEDULING | Facility: HOME HEALTH | Age: 73
End: 2018-12-19
Payer: MEDICARE

## 2018-12-19 PROCEDURE — 3331090002 HH PPS REVENUE DEBIT

## 2018-12-19 PROCEDURE — 3331090001 HH PPS REVENUE CREDIT

## 2018-12-19 PROCEDURE — G0300 HHS/HOSPICE OF LPN EA 15 MIN: HCPCS

## 2018-12-20 PROCEDURE — 3331090001 HH PPS REVENUE CREDIT

## 2018-12-20 PROCEDURE — 3331090002 HH PPS REVENUE DEBIT

## 2018-12-21 PROCEDURE — 3331090001 HH PPS REVENUE CREDIT

## 2018-12-21 PROCEDURE — 3331090002 HH PPS REVENUE DEBIT

## 2018-12-22 PROCEDURE — 3331090002 HH PPS REVENUE DEBIT

## 2018-12-22 PROCEDURE — 3331090001 HH PPS REVENUE CREDIT

## 2018-12-23 PROCEDURE — 3331090002 HH PPS REVENUE DEBIT

## 2018-12-23 PROCEDURE — 3331090001 HH PPS REVENUE CREDIT

## 2018-12-24 ENCOUNTER — HOME CARE VISIT (OUTPATIENT)
Dept: SCHEDULING | Facility: HOME HEALTH | Age: 73
End: 2018-12-24
Payer: MEDICARE

## 2018-12-24 PROCEDURE — 3331090002 HH PPS REVENUE DEBIT

## 2018-12-24 PROCEDURE — G0300 HHS/HOSPICE OF LPN EA 15 MIN: HCPCS

## 2018-12-24 PROCEDURE — 3331090001 HH PPS REVENUE CREDIT

## 2018-12-25 PROCEDURE — 3331090001 HH PPS REVENUE CREDIT

## 2018-12-25 PROCEDURE — 3331090002 HH PPS REVENUE DEBIT

## 2018-12-26 ENCOUNTER — HOME CARE VISIT (OUTPATIENT)
Dept: SCHEDULING | Facility: HOME HEALTH | Age: 73
End: 2018-12-26
Payer: MEDICARE

## 2018-12-26 PROCEDURE — 3331090002 HH PPS REVENUE DEBIT

## 2018-12-26 PROCEDURE — 3331090001 HH PPS REVENUE CREDIT

## 2018-12-26 PROCEDURE — G0300 HHS/HOSPICE OF LPN EA 15 MIN: HCPCS

## 2018-12-27 PROCEDURE — 3331090002 HH PPS REVENUE DEBIT

## 2018-12-27 PROCEDURE — 3331090001 HH PPS REVENUE CREDIT

## 2018-12-28 ENCOUNTER — HOME CARE VISIT (OUTPATIENT)
Dept: SCHEDULING | Facility: HOME HEALTH | Age: 73
End: 2018-12-28
Payer: MEDICARE

## 2018-12-28 VITALS
HEART RATE: 77 BPM | OXYGEN SATURATION: 97 % | SYSTOLIC BLOOD PRESSURE: 136 MMHG | RESPIRATION RATE: 18 BRPM | DIASTOLIC BLOOD PRESSURE: 79 MMHG

## 2018-12-28 PROCEDURE — G0300 HHS/HOSPICE OF LPN EA 15 MIN: HCPCS

## 2018-12-28 PROCEDURE — 3331090002 HH PPS REVENUE DEBIT

## 2018-12-28 PROCEDURE — 3331090001 HH PPS REVENUE CREDIT

## 2018-12-29 PROCEDURE — 3331090001 HH PPS REVENUE CREDIT

## 2018-12-29 PROCEDURE — 3331090002 HH PPS REVENUE DEBIT

## 2018-12-30 PROCEDURE — 3331090001 HH PPS REVENUE CREDIT

## 2018-12-30 PROCEDURE — 3331090002 HH PPS REVENUE DEBIT

## 2018-12-31 ENCOUNTER — TELEPHONE (OUTPATIENT)
Dept: SURGERY | Age: 73
End: 2018-12-31

## 2018-12-31 ENCOUNTER — HOME CARE VISIT (OUTPATIENT)
Dept: SCHEDULING | Facility: HOME HEALTH | Age: 73
End: 2018-12-31
Payer: MEDICARE

## 2018-12-31 PROCEDURE — G0300 HHS/HOSPICE OF LPN EA 15 MIN: HCPCS

## 2018-12-31 PROCEDURE — 3331090002 HH PPS REVENUE DEBIT

## 2018-12-31 PROCEDURE — 3331090001 HH PPS REVENUE CREDIT

## 2018-12-31 NOTE — TELEPHONE ENCOUNTER
Luclile call from [address] [facility]  stating patient had fever over weekend 100.7 with chills. Temp today 98.7 wound looks fine no signs of infection. Patient has follow up appt 01/02/2019.

## 2019-01-01 VITALS
OXYGEN SATURATION: 98 % | RESPIRATION RATE: 20 BRPM | SYSTOLIC BLOOD PRESSURE: 111 MMHG | DIASTOLIC BLOOD PRESSURE: 76 MMHG | HEART RATE: 63 BPM

## 2019-01-01 VITALS
SYSTOLIC BLOOD PRESSURE: 132 MMHG | DIASTOLIC BLOOD PRESSURE: 76 MMHG | HEART RATE: 62 BPM | SYSTOLIC BLOOD PRESSURE: 120 MMHG | OXYGEN SATURATION: 99 % | RESPIRATION RATE: 18 BRPM | DIASTOLIC BLOOD PRESSURE: 74 MMHG | RESPIRATION RATE: 19 BRPM | OXYGEN SATURATION: 98 % | HEART RATE: 62 BPM

## 2019-01-01 PROCEDURE — 3331090001 HH PPS REVENUE CREDIT

## 2019-01-01 PROCEDURE — 3331090002 HH PPS REVENUE DEBIT

## 2019-01-02 ENCOUNTER — OFFICE VISIT (OUTPATIENT)
Dept: SURGERY | Age: 74
End: 2019-01-02

## 2019-01-02 ENCOUNTER — HOME CARE VISIT (OUTPATIENT)
Dept: HOME HEALTH SERVICES | Facility: HOME HEALTH | Age: 74
End: 2019-01-02
Payer: MEDICARE

## 2019-01-02 VITALS
SYSTOLIC BLOOD PRESSURE: 140 MMHG | WEIGHT: 222.5 LBS | HEIGHT: 66 IN | BODY MASS INDEX: 35.76 KG/M2 | OXYGEN SATURATION: 96 % | DIASTOLIC BLOOD PRESSURE: 96 MMHG | HEART RATE: 79 BPM | RESPIRATION RATE: 20 BRPM | TEMPERATURE: 98.4 F

## 2019-01-02 DIAGNOSIS — N61.1 BREAST ABSCESS: Primary | ICD-10-CM

## 2019-01-02 PROCEDURE — 3331090001 HH PPS REVENUE CREDIT

## 2019-01-02 PROCEDURE — 3331090002 HH PPS REVENUE DEBIT

## 2019-01-02 NOTE — PROGRESS NOTES
Surgery  Follow up  Procedure: incisional debridement of right breast abscess/hematoma by Dr Thuy Flynn date:  12/3  Path:  benign    S I feel fine,     Visit Vitals  BP (!) 140/96 (BP 1 Location: Right arm, BP Patient Position: Sitting)   Pulse 79   Temp 98.4 °F (36.9 °C)   Resp 20   Ht 5' 6\" (1.676 m)   Wt 100.9 kg (222 lb 8 oz)   SpO2 96%   BMI 35.91 kg/m²       O Incisions healing well without infection   Wound with excellent granulation tissue except medially deep in wound with good granulation tissue.   5 x 10 x 10 cm deep medially  Wound VAC changed in office today     A/P Doing well   Continue wound VAC   RTC 3 weeks    Davonte Bolanos MD FACS

## 2019-01-02 NOTE — PROGRESS NOTES
Chief Complaint   Patient presents with    Follow-up     incisional debridement of right breast abscess/hematoma by Dr Katelin Acuna last seen 12/12/18 by Dr. Lindy House     1. Have you been to the ER, urgent care clinic since your last visit? Hospitalized since your last visit? No    2. Have you seen or consulted any other health care providers outside of the [address] [place] [address] since your last visit? Include any pap smears or colon screening.  No

## 2019-01-03 PROCEDURE — 3331090001 HH PPS REVENUE CREDIT

## 2019-01-03 PROCEDURE — 3331090002 HH PPS REVENUE DEBIT

## 2019-01-04 ENCOUNTER — HOME CARE VISIT (OUTPATIENT)
Dept: HOME HEALTH SERVICES | Facility: HOME HEALTH | Age: 74
End: 2019-01-04
Payer: MEDICARE

## 2019-01-04 PROCEDURE — 3331090002 HH PPS REVENUE DEBIT

## 2019-01-04 PROCEDURE — G0300 HHS/HOSPICE OF LPN EA 15 MIN: HCPCS

## 2019-01-04 PROCEDURE — 3331090001 HH PPS REVENUE CREDIT

## 2019-01-05 PROCEDURE — 3331090002 HH PPS REVENUE DEBIT

## 2019-01-05 PROCEDURE — 3331090001 HH PPS REVENUE CREDIT

## 2019-01-06 PROCEDURE — 3331090002 HH PPS REVENUE DEBIT

## 2019-01-06 PROCEDURE — 3331090001 HH PPS REVENUE CREDIT

## 2019-01-07 ENCOUNTER — HOME CARE VISIT (OUTPATIENT)
Dept: SCHEDULING | Facility: HOME HEALTH | Age: 74
End: 2019-01-07
Payer: MEDICARE

## 2019-01-07 PROCEDURE — 3331090002 HH PPS REVENUE DEBIT

## 2019-01-07 PROCEDURE — G0300 HHS/HOSPICE OF LPN EA 15 MIN: HCPCS

## 2019-01-07 PROCEDURE — 3331090001 HH PPS REVENUE CREDIT

## 2019-01-08 PROCEDURE — 3331090002 HH PPS REVENUE DEBIT

## 2019-01-08 PROCEDURE — 3331090001 HH PPS REVENUE CREDIT

## 2019-01-09 ENCOUNTER — HOME CARE VISIT (OUTPATIENT)
Dept: SCHEDULING | Facility: HOME HEALTH | Age: 74
End: 2019-01-09
Payer: MEDICARE

## 2019-01-09 PROCEDURE — G0300 HHS/HOSPICE OF LPN EA 15 MIN: HCPCS

## 2019-01-09 PROCEDURE — 3331090002 HH PPS REVENUE DEBIT

## 2019-01-09 PROCEDURE — 3331090001 HH PPS REVENUE CREDIT

## 2019-01-10 PROCEDURE — 3331090001 HH PPS REVENUE CREDIT

## 2019-01-10 PROCEDURE — 3331090002 HH PPS REVENUE DEBIT

## 2019-01-11 ENCOUNTER — HOME CARE VISIT (OUTPATIENT)
Dept: SCHEDULING | Facility: HOME HEALTH | Age: 74
End: 2019-01-11
Payer: MEDICARE

## 2019-01-11 PROCEDURE — 3331090002 HH PPS REVENUE DEBIT

## 2019-01-11 PROCEDURE — G0300 HHS/HOSPICE OF LPN EA 15 MIN: HCPCS

## 2019-01-11 PROCEDURE — 3331090001 HH PPS REVENUE CREDIT

## 2019-01-12 PROCEDURE — 3331090002 HH PPS REVENUE DEBIT

## 2019-01-12 PROCEDURE — 3331090001 HH PPS REVENUE CREDIT

## 2019-01-13 PROCEDURE — 3331090002 HH PPS REVENUE DEBIT

## 2019-01-13 PROCEDURE — 3331090001 HH PPS REVENUE CREDIT

## 2019-01-14 ENCOUNTER — HOME CARE VISIT (OUTPATIENT)
Dept: SCHEDULING | Facility: HOME HEALTH | Age: 74
End: 2019-01-14
Payer: MEDICARE

## 2019-01-14 VITALS
SYSTOLIC BLOOD PRESSURE: 115 MMHG | DIASTOLIC BLOOD PRESSURE: 75 MMHG | RESPIRATION RATE: 18 BRPM | OXYGEN SATURATION: 100 % | RESPIRATION RATE: 18 BRPM | DIASTOLIC BLOOD PRESSURE: 63 MMHG | RESPIRATION RATE: 18 BRPM | HEART RATE: 67 BPM | DIASTOLIC BLOOD PRESSURE: 78 MMHG | SYSTOLIC BLOOD PRESSURE: 132 MMHG | SYSTOLIC BLOOD PRESSURE: 97 MMHG | OXYGEN SATURATION: 98 % | HEART RATE: 74 BPM | HEART RATE: 67 BPM

## 2019-01-14 PROCEDURE — 3331090002 HH PPS REVENUE DEBIT

## 2019-01-14 PROCEDURE — G0300 HHS/HOSPICE OF LPN EA 15 MIN: HCPCS

## 2019-01-14 PROCEDURE — 3331090001 HH PPS REVENUE CREDIT

## 2019-01-15 PROCEDURE — 3331090001 HH PPS REVENUE CREDIT

## 2019-01-15 PROCEDURE — 3331090002 HH PPS REVENUE DEBIT

## 2019-01-16 PROCEDURE — 3331090002 HH PPS REVENUE DEBIT

## 2019-01-16 PROCEDURE — 3331090001 HH PPS REVENUE CREDIT

## 2019-01-17 ENCOUNTER — HOME CARE VISIT (OUTPATIENT)
Dept: HOME HEALTH SERVICES | Facility: HOME HEALTH | Age: 74
End: 2019-01-17
Payer: MEDICARE

## 2019-01-17 PROCEDURE — 3331090001 HH PPS REVENUE CREDIT

## 2019-01-17 PROCEDURE — 3331090002 HH PPS REVENUE DEBIT

## 2019-01-18 ENCOUNTER — HOME CARE VISIT (OUTPATIENT)
Dept: SCHEDULING | Facility: HOME HEALTH | Age: 74
End: 2019-01-18
Payer: MEDICARE

## 2019-01-18 PROCEDURE — G0300 HHS/HOSPICE OF LPN EA 15 MIN: HCPCS

## 2019-01-18 PROCEDURE — 3331090002 HH PPS REVENUE DEBIT

## 2019-01-18 PROCEDURE — 3331090001 HH PPS REVENUE CREDIT

## 2019-01-19 PROCEDURE — 3331090002 HH PPS REVENUE DEBIT

## 2019-01-19 PROCEDURE — 3331090001 HH PPS REVENUE CREDIT

## 2019-01-20 PROCEDURE — 3331090001 HH PPS REVENUE CREDIT

## 2019-01-20 PROCEDURE — 3331090002 HH PPS REVENUE DEBIT

## 2019-01-21 ENCOUNTER — HOME CARE VISIT (OUTPATIENT)
Dept: SCHEDULING | Facility: HOME HEALTH | Age: 74
End: 2019-01-21
Payer: MEDICARE

## 2019-01-21 VITALS
SYSTOLIC BLOOD PRESSURE: 111 MMHG | OXYGEN SATURATION: 98 % | HEART RATE: 63 BPM | DIASTOLIC BLOOD PRESSURE: 76 MMHG | RESPIRATION RATE: 18 BRPM | HEART RATE: 86 BPM | SYSTOLIC BLOOD PRESSURE: 128 MMHG | RESPIRATION RATE: 18 BRPM | OXYGEN SATURATION: 98 % | DIASTOLIC BLOOD PRESSURE: 78 MMHG

## 2019-01-21 PROCEDURE — 3331090001 HH PPS REVENUE CREDIT

## 2019-01-21 PROCEDURE — G0299 HHS/HOSPICE OF RN EA 15 MIN: HCPCS

## 2019-01-21 PROCEDURE — 3331090002 HH PPS REVENUE DEBIT

## 2019-01-22 PROCEDURE — 3331090001 HH PPS REVENUE CREDIT

## 2019-01-22 PROCEDURE — 3331090002 HH PPS REVENUE DEBIT

## 2019-01-23 ENCOUNTER — HOME CARE VISIT (OUTPATIENT)
Dept: SCHEDULING | Facility: HOME HEALTH | Age: 74
End: 2019-01-23
Payer: MEDICARE

## 2019-01-23 PROCEDURE — 3331090001 HH PPS REVENUE CREDIT

## 2019-01-23 PROCEDURE — 3331090002 HH PPS REVENUE DEBIT

## 2019-01-23 PROCEDURE — G0300 HHS/HOSPICE OF LPN EA 15 MIN: HCPCS

## 2019-01-24 PROCEDURE — 3331090002 HH PPS REVENUE DEBIT

## 2019-01-24 PROCEDURE — 3331090001 HH PPS REVENUE CREDIT

## 2019-01-25 ENCOUNTER — OFFICE VISIT (OUTPATIENT)
Dept: SURGERY | Age: 74
End: 2019-01-25

## 2019-01-25 VITALS
DIASTOLIC BLOOD PRESSURE: 77 MMHG | HEART RATE: 70 BPM | WEIGHT: 220 LBS | TEMPERATURE: 97.6 F | SYSTOLIC BLOOD PRESSURE: 136 MMHG | BODY MASS INDEX: 35.36 KG/M2 | OXYGEN SATURATION: 96 % | HEIGHT: 66 IN

## 2019-01-25 DIAGNOSIS — N61.1 BREAST ABSCESS: Primary | ICD-10-CM

## 2019-01-25 PROCEDURE — 3331090001 HH PPS REVENUE CREDIT

## 2019-01-25 PROCEDURE — 3331090002 HH PPS REVENUE DEBIT

## 2019-01-25 NOTE — PROGRESS NOTES
Chief Complaint   Patient presents with    Breast Problem     f/u breast abscess     1. Have you been to the ER, urgent care clinic since your last visit? Hospitalized since your last visit? No    2. Have you seen or consulted any other health care providers outside of the [address] [place] [address] since your last visit? Include any pap smears or colon screening.  No

## 2019-01-26 PROCEDURE — 3331090002 HH PPS REVENUE DEBIT

## 2019-01-26 PROCEDURE — 3331090001 HH PPS REVENUE CREDIT

## 2019-01-26 NOTE — PROGRESS NOTES
Surgery  Follow up  Procedure: incisional debridement of right breast abscess/hematoma by Dr Nori Beebe date:  12/3  Path:  benign    S I feel fine,     Visit Vitals  /77 (BP 1 Location: Right arm, BP Patient Position: Sitting)   Pulse 70   Temp 97.6 °F (36.4 °C)   Ht 5' 6\" (1.676 m)   Wt 99.8 kg (220 lb)   SpO2 96%   BMI 35.51 kg/m²       O Incisions healing well without infection   Wound with excellent granulation tissue except medially deep in wound with good granulation tissue. 5 x 10 x 4 cm deep medially.   The upper edge appears to adhering to the base of the wound  Wound VAC changed in office today     A/P Doing ok   VAC changed today   Continue wound VAC   She may ultimately require operative wound debridement but looks ok for now   RTC 3 weeks    Mahogany Khan MD FACS

## 2019-01-27 PROCEDURE — 3331090001 HH PPS REVENUE CREDIT

## 2019-01-27 PROCEDURE — 3331090002 HH PPS REVENUE DEBIT

## 2019-01-28 ENCOUNTER — HOME CARE VISIT (OUTPATIENT)
Dept: SCHEDULING | Facility: HOME HEALTH | Age: 74
End: 2019-01-28
Payer: MEDICARE

## 2019-01-28 ENCOUNTER — TELEPHONE (OUTPATIENT)
Dept: SURGERY | Age: 74
End: 2019-01-28

## 2019-01-28 PROCEDURE — 3331090002 HH PPS REVENUE DEBIT

## 2019-01-28 PROCEDURE — 3331090001 HH PPS REVENUE CREDIT

## 2019-01-28 PROCEDURE — G0300 HHS/HOSPICE OF LPN EA 15 MIN: HCPCS

## 2019-01-28 NOTE — TELEPHONE ENCOUNTER
Dinh Patel MD   You 29 minutes ago (4:30 PM)      Have them culture wound   Order US right breast to look for occult undrained collection     Jesús Solomon MD FACS        Spoke with [facility] health nurse and patient informed them both of message above and they verbalized understanding.

## 2019-01-28 NOTE — TELEPHONE ENCOUNTER
Lucille called home health nurse  stating patit had fever Sat night 100.9 today 99.0, fever, chills, yellow drainage from wound area, iris wound red/raw. Please advise.

## 2019-01-29 ENCOUNTER — HOSPITAL ENCOUNTER (OUTPATIENT)
Dept: LAB | Age: 74
Discharge: HOME | End: 2019-01-29
Payer: MEDICARE

## 2019-01-29 VITALS
RESPIRATION RATE: 18 BRPM | DIASTOLIC BLOOD PRESSURE: 81 MMHG | OXYGEN SATURATION: 98 % | SYSTOLIC BLOOD PRESSURE: 128 MMHG | HEART RATE: 63 BPM

## 2019-01-29 DIAGNOSIS — N61.1 BREAST ABSCESS: Primary | ICD-10-CM

## 2019-01-29 PROCEDURE — 3331090001 HH PPS REVENUE CREDIT

## 2019-01-29 PROCEDURE — 3331090002 HH PPS REVENUE DEBIT

## 2019-01-29 PROCEDURE — 87077 CULTURE AEROBIC IDENTIFY: CPT

## 2019-01-29 PROCEDURE — 87186 SC STD MICRODIL/AGAR DIL: CPT

## 2019-01-29 PROCEDURE — 87070 CULTURE OTHR SPECIMN AEROBIC: CPT

## 2019-01-29 NOTE — TELEPHONE ENCOUNTER
Spoke with patient informed her I scheduled her rt breast ultrasound 01/31/2019 at 8:30am at [facility] location. She verbalized understanding.

## 2019-01-30 ENCOUNTER — TELEPHONE (OUTPATIENT)
Dept: SURGERY | Age: 74
End: 2019-01-30

## 2019-01-30 PROCEDURE — 3331090001 HH PPS REVENUE CREDIT

## 2019-01-30 PROCEDURE — 3331090002 HH PPS REVENUE DEBIT

## 2019-01-30 NOTE — TELEPHONE ENCOUNTER
Received called from [place] at [facility]  she stated they need an order for rt breast dx mammogram.  Please advise.

## 2019-01-31 ENCOUNTER — HOSPITAL ENCOUNTER (OUTPATIENT)
Dept: ULTRASOUND IMAGING | Age: 74
Discharge: HOME | End: 2019-01-31
Payer: MEDICARE

## 2019-01-31 ENCOUNTER — HOSPITAL ENCOUNTER (OUTPATIENT)
Dept: MAMMOGRAPHY | Age: 74
Discharge: HOME | End: 2019-01-31
Payer: MEDICARE

## 2019-01-31 DIAGNOSIS — Z98.890 S/P BREAST BIOPSY: ICD-10-CM

## 2019-01-31 DIAGNOSIS — Z09 FOLLOW-UP EXAM: ICD-10-CM

## 2019-01-31 DIAGNOSIS — N61.1 BREAST ABSCESS: ICD-10-CM

## 2019-01-31 DIAGNOSIS — N61.1 ABSCESS OF BREAST: ICD-10-CM

## 2019-01-31 PROCEDURE — 76642 ULTRASOUND BREAST LIMITED: CPT

## 2019-01-31 PROCEDURE — 3331090001 HH PPS REVENUE CREDIT

## 2019-01-31 PROCEDURE — 3331090002 HH PPS REVENUE DEBIT

## 2019-02-01 VITALS
HEART RATE: 63 BPM | SYSTOLIC BLOOD PRESSURE: 123 MMHG | OXYGEN SATURATION: 98 % | RESPIRATION RATE: 18 BRPM | DIASTOLIC BLOOD PRESSURE: 87 MMHG | TEMPERATURE: 99 F

## 2019-02-01 PROCEDURE — 3331090002 HH PPS REVENUE DEBIT

## 2019-02-01 PROCEDURE — 3331090001 HH PPS REVENUE CREDIT

## 2019-02-02 LAB
BACTERIA SPEC CULT: ABNORMAL
GRAM STN SPEC: ABNORMAL
GRAM STN SPEC: ABNORMAL
SERVICE CMNT-IMP: ABNORMAL

## 2019-02-02 PROCEDURE — 3331090002 HH PPS REVENUE DEBIT

## 2019-02-02 PROCEDURE — 3331090001 HH PPS REVENUE CREDIT

## 2019-02-03 PROCEDURE — 3331090001 HH PPS REVENUE CREDIT

## 2019-02-03 PROCEDURE — 3331090002 HH PPS REVENUE DEBIT

## 2019-02-04 ENCOUNTER — HOME CARE VISIT (OUTPATIENT)
Dept: HOME HEALTH SERVICES | Facility: HOME HEALTH | Age: 74
End: 2019-02-04
Payer: MEDICARE

## 2019-02-04 VITALS
TEMPERATURE: 97.2 F | RESPIRATION RATE: 16 BRPM | OXYGEN SATURATION: 97 % | SYSTOLIC BLOOD PRESSURE: 130 MMHG | HEART RATE: 73 BPM | DIASTOLIC BLOOD PRESSURE: 72 MMHG

## 2019-02-04 PROCEDURE — 3331090002 HH PPS REVENUE DEBIT

## 2019-02-04 PROCEDURE — 3331090001 HH PPS REVENUE CREDIT

## 2019-02-04 PROCEDURE — G0299 HHS/HOSPICE OF RN EA 15 MIN: HCPCS

## 2019-02-05 PROCEDURE — 3331090002 HH PPS REVENUE DEBIT

## 2019-02-05 PROCEDURE — 3331090001 HH PPS REVENUE CREDIT

## 2019-02-06 PROCEDURE — 3331090001 HH PPS REVENUE CREDIT

## 2019-02-06 PROCEDURE — 3331090002 HH PPS REVENUE DEBIT

## 2019-02-07 ENCOUNTER — HOME CARE VISIT (OUTPATIENT)
Dept: SCHEDULING | Facility: HOME HEALTH | Age: 74
End: 2019-02-07
Payer: MEDICARE

## 2019-02-07 PROCEDURE — 3331090001 HH PPS REVENUE CREDIT

## 2019-02-07 PROCEDURE — G0300 HHS/HOSPICE OF LPN EA 15 MIN: HCPCS

## 2019-02-07 PROCEDURE — 3331090002 HH PPS REVENUE DEBIT

## 2019-02-08 ENCOUNTER — HOME CARE VISIT (OUTPATIENT)
Dept: SCHEDULING | Facility: HOME HEALTH | Age: 74
End: 2019-02-08
Payer: MEDICARE

## 2019-02-08 PROCEDURE — 3331090001 HH PPS REVENUE CREDIT

## 2019-02-08 PROCEDURE — G0300 HHS/HOSPICE OF LPN EA 15 MIN: HCPCS

## 2019-02-08 PROCEDURE — 3331090002 HH PPS REVENUE DEBIT

## 2019-02-09 PROCEDURE — 3331090001 HH PPS REVENUE CREDIT

## 2019-02-09 PROCEDURE — 3331090002 HH PPS REVENUE DEBIT

## 2019-02-10 PROCEDURE — 3331090002 HH PPS REVENUE DEBIT

## 2019-02-10 PROCEDURE — 3331090001 HH PPS REVENUE CREDIT

## 2019-02-11 ENCOUNTER — HOME CARE VISIT (OUTPATIENT)
Dept: SCHEDULING | Facility: HOME HEALTH | Age: 74
End: 2019-02-11
Payer: MEDICARE

## 2019-02-11 VITALS
RESPIRATION RATE: 18 BRPM | OXYGEN SATURATION: 99 % | HEART RATE: 63 BPM | SYSTOLIC BLOOD PRESSURE: 126 MMHG | OXYGEN SATURATION: 99 % | DIASTOLIC BLOOD PRESSURE: 74 MMHG | HEART RATE: 62 BPM | DIASTOLIC BLOOD PRESSURE: 82 MMHG | RESPIRATION RATE: 18 BRPM | SYSTOLIC BLOOD PRESSURE: 121 MMHG

## 2019-02-11 PROCEDURE — 3331090001 HH PPS REVENUE CREDIT

## 2019-02-11 PROCEDURE — G0299 HHS/HOSPICE OF RN EA 15 MIN: HCPCS

## 2019-02-11 PROCEDURE — 3331090002 HH PPS REVENUE DEBIT

## 2019-02-12 PROCEDURE — 3331090003 HH PPS REVENUE ADJ

## 2019-02-12 PROCEDURE — 3331090001 HH PPS REVENUE CREDIT

## 2019-02-12 PROCEDURE — 3331090002 HH PPS REVENUE DEBIT

## 2019-02-13 ENCOUNTER — OFFICE VISIT (OUTPATIENT)
Dept: SURGERY | Age: 74
End: 2019-02-13

## 2019-02-13 ENCOUNTER — TELEPHONE (OUTPATIENT)
Dept: SURGERY | Age: 74
End: 2019-02-13

## 2019-02-13 VITALS
TEMPERATURE: 97.8 F | HEART RATE: 79 BPM | BODY MASS INDEX: 35.31 KG/M2 | HEIGHT: 66 IN | OXYGEN SATURATION: 98 % | DIASTOLIC BLOOD PRESSURE: 72 MMHG | WEIGHT: 219.7 LBS | RESPIRATION RATE: 20 BRPM | SYSTOLIC BLOOD PRESSURE: 122 MMHG

## 2019-02-13 VITALS
TEMPERATURE: 96 F | RESPIRATION RATE: 20 BRPM | DIASTOLIC BLOOD PRESSURE: 72 MMHG | HEART RATE: 70 BPM | SYSTOLIC BLOOD PRESSURE: 110 MMHG | OXYGEN SATURATION: 99 %

## 2019-02-13 DIAGNOSIS — N61.1 BREAST ABSCESS: Primary | ICD-10-CM

## 2019-02-13 PROCEDURE — 400014 HH F/U

## 2019-02-13 PROCEDURE — 3331090002 HH PPS REVENUE DEBIT

## 2019-02-13 PROCEDURE — 3331090001 HH PPS REVENUE CREDIT

## 2019-02-13 RX ORDER — MICONAZOLE NITRATE 2 %
1 CREAM WITH APPLICATOR VAGINAL
COMMUNITY
End: 2019-10-18 | Stop reason: CLARIF

## 2019-02-13 RX ORDER — HYDROCORTISONE BUTYRATE 1 MG/G
CREAM TOPICAL 2 TIMES DAILY
COMMUNITY
End: 2019-10-18 | Stop reason: CLARIF

## 2019-02-13 NOTE — PROGRESS NOTES
Surgery  Follow up  Procedure: incisional debridement of right breast abscess/hematoma by Dr Denney Orozco date:  12/3  Path:  benign    S I feel fine,     Visit Vitals  /72 (BP 1 Location: Left arm, BP Patient Position: Sitting)   Pulse 79   Temp 97.8 °F (36.6 °C)   Resp 20   Ht 5' 6\" (1.676 m)   Wt 99.7 kg (219 lb 11.2 oz)   SpO2 98%   BMI 35.46 kg/m²       O Incisions healing well without infection   Wound with excellent granulation tissue except medially deep in wound with good granulation tissue. 5 x 10 x 4 cm deep medially.   The upper edge appears to adhering to the base of the wound    Wound cultures mixed    A/P Doing ok   Stop Dakins dressing changes   Start Aquacell Ag daily      RTC 3 weeks    Ashli John MD FACS

## 2019-02-13 NOTE — TELEPHONE ENCOUNTER
Left message Lorenza Lira to call from [facility]  re per Dr. Juliet Mortensen he wants patient to stop Dakins and start Aquacell Ag.

## 2019-02-13 NOTE — PROGRESS NOTES
Chief Complaint   Patient presents with    Breast Problem     S/P Incision and drainage of right breast infected hematoma with abscess. 12/03/18 Zac Dutta     1. Have you been to the ER, urgent care clinic since your last visit? Hospitalized since your last visit? No    2. Have you seen or consulted any other health care providers outside of the [address] [place] [address] since your last visit? Include any pap smears or colon screening.  Yes When: Dr. Vivian Meadows 2/6/19 yeast infection

## 2019-02-14 PROCEDURE — 3331090001 HH PPS REVENUE CREDIT

## 2019-02-14 PROCEDURE — 3331090002 HH PPS REVENUE DEBIT

## 2019-02-14 NOTE — TELEPHONE ENCOUNTER
Spoke with Cassy Matson to call from [facility]  re per Dr. Cheo Chow he wants patient to stop Dakins and start Aquacell Ag.

## 2019-02-15 ENCOUNTER — HOME CARE VISIT (OUTPATIENT)
Dept: SCHEDULING | Facility: HOME HEALTH | Age: 74
End: 2019-02-15
Payer: MEDICARE

## 2019-02-15 PROCEDURE — 3331090002 HH PPS REVENUE DEBIT

## 2019-02-15 PROCEDURE — 3331090001 HH PPS REVENUE CREDIT

## 2019-02-15 PROCEDURE — G0300 HHS/HOSPICE OF LPN EA 15 MIN: HCPCS

## 2019-02-15 PROCEDURE — 400014 HH F/U

## 2019-02-16 PROCEDURE — 3331090001 HH PPS REVENUE CREDIT

## 2019-02-16 PROCEDURE — 3331090002 HH PPS REVENUE DEBIT

## 2019-02-17 PROCEDURE — 3331090002 HH PPS REVENUE DEBIT

## 2019-02-17 PROCEDURE — 3331090001 HH PPS REVENUE CREDIT

## 2019-02-18 ENCOUNTER — HOME CARE VISIT (OUTPATIENT)
Dept: SCHEDULING | Facility: HOME HEALTH | Age: 74
End: 2019-02-18
Payer: MEDICARE

## 2019-02-18 PROCEDURE — 3331090001 HH PPS REVENUE CREDIT

## 2019-02-18 PROCEDURE — G0300 HHS/HOSPICE OF LPN EA 15 MIN: HCPCS

## 2019-02-18 PROCEDURE — 3331090002 HH PPS REVENUE DEBIT

## 2019-02-19 PROCEDURE — 3331090001 HH PPS REVENUE CREDIT

## 2019-02-19 PROCEDURE — 3331090002 HH PPS REVENUE DEBIT

## 2019-02-20 ENCOUNTER — HOME CARE VISIT (OUTPATIENT)
Dept: SCHEDULING | Facility: HOME HEALTH | Age: 74
End: 2019-02-20
Payer: MEDICARE

## 2019-02-20 PROCEDURE — 3331090002 HH PPS REVENUE DEBIT

## 2019-02-20 PROCEDURE — 3331090001 HH PPS REVENUE CREDIT

## 2019-02-20 PROCEDURE — G0300 HHS/HOSPICE OF LPN EA 15 MIN: HCPCS

## 2019-02-21 PROCEDURE — 3331090001 HH PPS REVENUE CREDIT

## 2019-02-21 PROCEDURE — 3331090002 HH PPS REVENUE DEBIT

## 2019-02-22 ENCOUNTER — HOME CARE VISIT (OUTPATIENT)
Dept: SCHEDULING | Facility: HOME HEALTH | Age: 74
End: 2019-02-22
Payer: MEDICARE

## 2019-02-22 PROCEDURE — G0300 HHS/HOSPICE OF LPN EA 15 MIN: HCPCS

## 2019-02-22 PROCEDURE — 3331090001 HH PPS REVENUE CREDIT

## 2019-02-22 PROCEDURE — 3331090002 HH PPS REVENUE DEBIT

## 2019-02-23 PROCEDURE — 3331090001 HH PPS REVENUE CREDIT

## 2019-02-23 PROCEDURE — 3331090002 HH PPS REVENUE DEBIT

## 2019-02-24 PROCEDURE — 3331090002 HH PPS REVENUE DEBIT

## 2019-02-24 PROCEDURE — 3331090001 HH PPS REVENUE CREDIT

## 2019-02-25 ENCOUNTER — HOME CARE VISIT (OUTPATIENT)
Dept: SCHEDULING | Facility: HOME HEALTH | Age: 74
End: 2019-02-25
Payer: MEDICARE

## 2019-02-25 PROCEDURE — 3331090002 HH PPS REVENUE DEBIT

## 2019-02-25 PROCEDURE — G0300 HHS/HOSPICE OF LPN EA 15 MIN: HCPCS

## 2019-02-25 PROCEDURE — 3331090001 HH PPS REVENUE CREDIT

## 2019-02-26 VITALS
HEART RATE: 62 BPM | HEART RATE: 72 BPM | OXYGEN SATURATION: 96 % | RESPIRATION RATE: 18 BRPM | DIASTOLIC BLOOD PRESSURE: 68 MMHG | OXYGEN SATURATION: 100 % | RESPIRATION RATE: 18 BRPM | DIASTOLIC BLOOD PRESSURE: 73 MMHG | SYSTOLIC BLOOD PRESSURE: 107 MMHG | OXYGEN SATURATION: 98 % | HEART RATE: 67 BPM | RESPIRATION RATE: 18 BRPM | DIASTOLIC BLOOD PRESSURE: 76 MMHG | SYSTOLIC BLOOD PRESSURE: 126 MMHG | SYSTOLIC BLOOD PRESSURE: 131 MMHG

## 2019-02-26 PROCEDURE — 3331090001 HH PPS REVENUE CREDIT

## 2019-02-26 PROCEDURE — 3331090002 HH PPS REVENUE DEBIT

## 2019-02-27 ENCOUNTER — HOME CARE VISIT (OUTPATIENT)
Dept: SCHEDULING | Facility: HOME HEALTH | Age: 74
End: 2019-02-27
Payer: MEDICARE

## 2019-02-27 VITALS — OXYGEN SATURATION: 98 % | SYSTOLIC BLOOD PRESSURE: 118 MMHG | DIASTOLIC BLOOD PRESSURE: 76 MMHG | HEART RATE: 63 BPM

## 2019-02-27 PROCEDURE — 3331090001 HH PPS REVENUE CREDIT

## 2019-02-27 PROCEDURE — G0300 HHS/HOSPICE OF LPN EA 15 MIN: HCPCS

## 2019-02-27 PROCEDURE — 3331090002 HH PPS REVENUE DEBIT

## 2019-02-28 PROCEDURE — 3331090002 HH PPS REVENUE DEBIT

## 2019-02-28 PROCEDURE — 3331090001 HH PPS REVENUE CREDIT

## 2019-03-01 ENCOUNTER — HOME CARE VISIT (OUTPATIENT)
Dept: SCHEDULING | Facility: HOME HEALTH | Age: 74
End: 2019-03-01
Payer: MEDICARE

## 2019-03-01 VITALS
TEMPERATURE: 98.7 F | HEART RATE: 65 BPM | OXYGEN SATURATION: 98 % | SYSTOLIC BLOOD PRESSURE: 119 MMHG | RESPIRATION RATE: 16 BRPM | DIASTOLIC BLOOD PRESSURE: 76 MMHG

## 2019-03-01 PROCEDURE — G0300 HHS/HOSPICE OF LPN EA 15 MIN: HCPCS

## 2019-03-01 PROCEDURE — 3331090002 HH PPS REVENUE DEBIT

## 2019-03-01 PROCEDURE — 3331090001 HH PPS REVENUE CREDIT

## 2019-03-02 VITALS
RESPIRATION RATE: 18 BRPM | HEART RATE: 87 BPM | SYSTOLIC BLOOD PRESSURE: 123 MMHG | OXYGEN SATURATION: 98 % | DIASTOLIC BLOOD PRESSURE: 88 MMHG

## 2019-03-02 PROCEDURE — 3331090001 HH PPS REVENUE CREDIT

## 2019-03-02 PROCEDURE — 3331090002 HH PPS REVENUE DEBIT

## 2019-03-03 PROCEDURE — 3331090002 HH PPS REVENUE DEBIT

## 2019-03-03 PROCEDURE — 3331090001 HH PPS REVENUE CREDIT

## 2019-03-04 ENCOUNTER — HOME CARE VISIT (OUTPATIENT)
Dept: SCHEDULING | Facility: HOME HEALTH | Age: 74
End: 2019-03-04
Payer: MEDICARE

## 2019-03-04 PROCEDURE — G0300 HHS/HOSPICE OF LPN EA 15 MIN: HCPCS

## 2019-03-04 PROCEDURE — 3331090001 HH PPS REVENUE CREDIT

## 2019-03-04 PROCEDURE — 3331090002 HH PPS REVENUE DEBIT

## 2019-03-05 VITALS
TEMPERATURE: 98.6 F | SYSTOLIC BLOOD PRESSURE: 104 MMHG | HEART RATE: 65 BPM | OXYGEN SATURATION: 99 % | DIASTOLIC BLOOD PRESSURE: 66 MMHG | RESPIRATION RATE: 18 BRPM

## 2019-03-05 PROCEDURE — 3331090002 HH PPS REVENUE DEBIT

## 2019-03-05 PROCEDURE — 3331090001 HH PPS REVENUE CREDIT

## 2019-03-06 ENCOUNTER — HOME CARE VISIT (OUTPATIENT)
Dept: SCHEDULING | Facility: HOME HEALTH | Age: 74
End: 2019-03-06
Payer: MEDICARE

## 2019-03-06 PROCEDURE — 3331090001 HH PPS REVENUE CREDIT

## 2019-03-06 PROCEDURE — G0300 HHS/HOSPICE OF LPN EA 15 MIN: HCPCS

## 2019-03-06 PROCEDURE — 3331090002 HH PPS REVENUE DEBIT

## 2019-03-07 VITALS
DIASTOLIC BLOOD PRESSURE: 86 MMHG | HEART RATE: 64 BPM | RESPIRATION RATE: 18 BRPM | OXYGEN SATURATION: 99 % | SYSTOLIC BLOOD PRESSURE: 122 MMHG

## 2019-03-07 PROCEDURE — 3331090002 HH PPS REVENUE DEBIT

## 2019-03-07 PROCEDURE — 3331090001 HH PPS REVENUE CREDIT

## 2019-03-08 ENCOUNTER — HOME CARE VISIT (OUTPATIENT)
Dept: HOME HEALTH SERVICES | Facility: HOME HEALTH | Age: 74
End: 2019-03-08
Payer: MEDICARE

## 2019-03-08 ENCOUNTER — OFFICE VISIT (OUTPATIENT)
Dept: SURGERY | Age: 74
End: 2019-03-08

## 2019-03-08 VITALS
HEART RATE: 58 BPM | OXYGEN SATURATION: 99 % | HEIGHT: 66 IN | BODY MASS INDEX: 35.81 KG/M2 | WEIGHT: 222.8 LBS | RESPIRATION RATE: 20 BRPM | SYSTOLIC BLOOD PRESSURE: 138 MMHG | DIASTOLIC BLOOD PRESSURE: 78 MMHG | TEMPERATURE: 97.5 F

## 2019-03-08 DIAGNOSIS — S21.001D BREAST WOUND, RIGHT, SUBSEQUENT ENCOUNTER: Primary | ICD-10-CM

## 2019-03-08 PROCEDURE — 3331090002 HH PPS REVENUE DEBIT

## 2019-03-08 PROCEDURE — 3331090001 HH PPS REVENUE CREDIT

## 2019-03-08 NOTE — PROGRESS NOTES
HISTORY OF PRESENT ILLNESS  Minal Greer is a 72 y.o. female who comes in for follow up for a chronic breast wound  HPI  She had undergone a right breast biopsy and developed a large hematoma. This got infected and she had a very large debridement 12/3/2019. She was managed with a wound VAC but still had problems with infection. She was changed to Dakins dressing changes and then back to saline dressing changes and the aquacell Ag. She reports some drainage and odor but no pain, fever, chills or sweats. Past Medical History:   Diagnosis Date    Arrhythmia     Atrial Fibrillation    CAD (coronary artery disease)     Diabetes ([facility] [address]     Hypertension      Past Surgical History:   Procedure Laterality Date    BREAST SURGERY PROCEDURE UNLISTED      left breast bx- benign    CARDIAC SURG PROCEDURE UNLIST   or     Coronary Stent    HX GASTRIC BYPASS      HX HYSTERECTOMY      HX LAP CHOLECYSTECTOMY      HX MASTECTOMY Right 2018    RIGHT BREAST BIOPSY WITH NEEDLE LOCALIZATION (1030) performed by Shayy Chaudhari MD at [address] HX MASTECTOMY Right 2018     Incision and drainage of right breast infected hematoma with abscess. Family History   Problem Relation Age of Onset    Cancer Mother 72        lung     Social History     Tobacco Use    Smoking status: Former Smoker     Last attempt to quit: 1992     Years since quittin.3    Smokeless tobacco: Never Used   Substance Use Topics    Alcohol use: Yes     Comment: oc    Drug use: No     Current Outpatient Medications   Medication Sig    metFORMIN (GLUCOPHAGE) 500 mg tablet Take 500 mg by mouth two (2) times daily (with meals).  lisinopril-hydroCHLOROthiazide (PRINZIDE, ZESTORETIC) 20-12.5 mg per tablet Take 1 Tab by mouth daily.  simvastatin (ZOCOR) 40 mg tablet Take 40 mg by mouth nightly.  metoprolol tartrate (LOPRESSOR) 50 mg tablet Take 50 mg by mouth daily.     amoxicillin/potassium clav (AMOXICILLIN-POT CLAVULANATE PO) Take  by mouth.  hydrocortisone butyrate (LOCOID) 0.1 % topical cream Apply  to affected area two (2) times a day.  miconazole (MONISTAT 7) 2 % vaginal cream Insert 1 Applicator into vagina nightly.  Liraglutide (VICTOZA 3-LUIS) 0.6 mg/0.1 mL (18 mg/3 mL) pnij 0.6 mg by SubCUTAneous route. No current facility-administered medications for this visit. No Known Allergies    Review of Systems   Constitutional: Negative for chills, diaphoresis, fever and weight loss. HENT: Negative for sore throat. Eyes: Negative for blurred vision and discharge. Respiratory: Negative for cough, shortness of breath and wheezing. Cardiovascular: Negative for chest pain, palpitations, orthopnea, claudication and leg swelling. Gastrointestinal: Negative for abdominal pain, constipation, diarrhea, heartburn, melena, nausea and vomiting. Genitourinary: Negative for dysuria, flank pain, frequency and hematuria. Musculoskeletal: Negative for back pain, joint pain, myalgias and neck pain. Skin: Negative for rash. Neurological: Negative for dizziness, speech change, focal weakness, seizures, loss of consciousness, weakness and headaches. Endo/Heme/Allergies: Does not bruise/bleed easily. Psychiatric/Behavioral: Negative for depression and memory loss. Visit Vitals  /78 (BP 1 Location: Left arm, BP Patient Position: Sitting)   Pulse (!) 58   Temp 97.5 °F (36.4 °C)   Resp 20   Ht 5' 6\" (1.676 m)   Wt 101.1 kg (222 lb 12.8 oz)   SpO2 99%   BMI 35.96 kg/m²       Physical Exam   Constitutional: She is oriented to person, place, and time. She appears well-developed and well-nourished. No distress. HENT:   Head: Normocephalic and atraumatic. Mouth/Throat: Oropharynx is clear and moist. No oropharyngeal exudate. Eyes: Conjunctivae and EOM are normal. Pupils are equal, round, and reactive to light. No scleral icterus. Neck: Normal range of motion. Neck supple. No JVD present. No tracheal deviation present. No thyromegaly present. Cardiovascular: Normal rate and regular rhythm. Exam reveals no gallop and no friction rub. No murmur heard. Pulmonary/Chest: Effort normal and breath sounds normal. No respiratory distress. She has no wheezes. She has no rales. Right breast exhibits skin change (large open wound 4 x 15 x 2 cm in UOQ with excellent granulation tissue). Right breast exhibits no inverted nipple, no mass, no nipple discharge and no tenderness. Left breast exhibits no inverted nipple, no mass, no nipple discharge, no skin change and no tenderness. Breasts are symmetrical.       Abdominal: Soft. Bowel sounds are normal. She exhibits no distension and no mass. There is no tenderness. There is no rebound and no guarding. Musculoskeletal: Normal range of motion. She exhibits no edema. Lymphadenopathy:     She has no cervical adenopathy. She has no axillary adenopathy. Right: No supraclavicular adenopathy present. Left: No supraclavicular adenopathy present. Neurological: She is alert and oriented to person, place, and time. No cranial nerve deficit. Skin: Skin is warm and dry. No rash noted. She is not diaphoretic. No erythema. No pallor. Psychiatric: Her behavior is normal. Judgment and thought content normal.       ASSESSMENT and PLAN  1. Chronic right breast wound. Slowly healing. No infection at present.   The upper skin flap has attached to the base of the wound more like a marsupialization and may require surgical intervention but would like more healing first.  She is in process of moving to the area in the next month so would hold on intervention until after that  Continue wound care  RTC 3 weeks    Jatin Tong MD FACS

## 2019-03-08 NOTE — PROGRESS NOTES
Chief Complaint   Patient presents with    Breast Problem     S/P   Incision and drainage of right breast infected hematoma with abscess. 12/03/2018       1. Have you been to the ER, urgent care clinic since your last visit? Hospitalized since your last visit? No    2. Have you seen or consulted any other health care providers outside of the [address] [place] [address] since your last visit? Include any pap smears or colon screening.  No

## 2019-03-09 PROCEDURE — 3331090002 HH PPS REVENUE DEBIT

## 2019-03-09 PROCEDURE — 3331090001 HH PPS REVENUE CREDIT

## 2019-03-10 PROCEDURE — 3331090002 HH PPS REVENUE DEBIT

## 2019-03-10 PROCEDURE — 3331090001 HH PPS REVENUE CREDIT

## 2019-03-11 ENCOUNTER — HOME CARE VISIT (OUTPATIENT)
Dept: SCHEDULING | Facility: HOME HEALTH | Age: 74
End: 2019-03-11
Payer: MEDICARE

## 2019-03-11 PROCEDURE — 3331090002 HH PPS REVENUE DEBIT

## 2019-03-11 PROCEDURE — 3331090001 HH PPS REVENUE CREDIT

## 2019-03-11 PROCEDURE — G0300 HHS/HOSPICE OF LPN EA 15 MIN: HCPCS

## 2019-03-12 PROCEDURE — 3331090002 HH PPS REVENUE DEBIT

## 2019-03-12 PROCEDURE — 3331090001 HH PPS REVENUE CREDIT

## 2019-03-13 VITALS
OXYGEN SATURATION: 98 % | SYSTOLIC BLOOD PRESSURE: 137 MMHG | DIASTOLIC BLOOD PRESSURE: 85 MMHG | RESPIRATION RATE: 18 BRPM | HEART RATE: 67 BPM

## 2019-03-13 PROCEDURE — 3331090001 HH PPS REVENUE CREDIT

## 2019-03-13 PROCEDURE — 3331090002 HH PPS REVENUE DEBIT

## 2019-03-14 ENCOUNTER — HOME CARE VISIT (OUTPATIENT)
Dept: HOME HEALTH SERVICES | Facility: HOME HEALTH | Age: 74
End: 2019-03-14
Payer: MEDICARE

## 2019-03-14 PROCEDURE — 3331090002 HH PPS REVENUE DEBIT

## 2019-03-14 PROCEDURE — G0300 HHS/HOSPICE OF LPN EA 15 MIN: HCPCS

## 2019-03-14 PROCEDURE — 3331090001 HH PPS REVENUE CREDIT

## 2019-03-15 VITALS
RESPIRATION RATE: 18 BRPM | HEART RATE: 78 BPM | SYSTOLIC BLOOD PRESSURE: 115 MMHG | OXYGEN SATURATION: 97 % | DIASTOLIC BLOOD PRESSURE: 6 MMHG

## 2019-03-15 PROCEDURE — 3331090001 HH PPS REVENUE CREDIT

## 2019-03-15 PROCEDURE — 3331090002 HH PPS REVENUE DEBIT

## 2019-03-16 ENCOUNTER — HOME CARE VISIT (OUTPATIENT)
Dept: SCHEDULING | Facility: HOME HEALTH | Age: 74
End: 2019-03-16
Payer: MEDICARE

## 2019-03-16 PROCEDURE — 3331090001 HH PPS REVENUE CREDIT

## 2019-03-16 PROCEDURE — G0300 HHS/HOSPICE OF LPN EA 15 MIN: HCPCS

## 2019-03-16 PROCEDURE — 3331090002 HH PPS REVENUE DEBIT

## 2019-03-17 PROCEDURE — 3331090002 HH PPS REVENUE DEBIT

## 2019-03-17 PROCEDURE — 3331090001 HH PPS REVENUE CREDIT

## 2019-03-18 ENCOUNTER — HOME CARE VISIT (OUTPATIENT)
Dept: SCHEDULING | Facility: HOME HEALTH | Age: 74
End: 2019-03-18
Payer: MEDICARE

## 2019-03-18 VITALS
HEART RATE: 67 BPM | DIASTOLIC BLOOD PRESSURE: 64 MMHG | OXYGEN SATURATION: 99 % | RESPIRATION RATE: 18 BRPM | SYSTOLIC BLOOD PRESSURE: 98 MMHG

## 2019-03-18 PROCEDURE — 3331090001 HH PPS REVENUE CREDIT

## 2019-03-18 PROCEDURE — 3331090002 HH PPS REVENUE DEBIT

## 2019-03-18 PROCEDURE — G0300 HHS/HOSPICE OF LPN EA 15 MIN: HCPCS

## 2019-03-19 VITALS
SYSTOLIC BLOOD PRESSURE: 120 MMHG | TEMPERATURE: 98.1 F | RESPIRATION RATE: 18 BRPM | OXYGEN SATURATION: 98 % | DIASTOLIC BLOOD PRESSURE: 74 MMHG | HEART RATE: 88 BPM

## 2019-03-19 PROCEDURE — 3331090002 HH PPS REVENUE DEBIT

## 2019-03-19 PROCEDURE — 3331090001 HH PPS REVENUE CREDIT

## 2019-03-20 PROCEDURE — 3331090001 HH PPS REVENUE CREDIT

## 2019-03-20 PROCEDURE — 3331090002 HH PPS REVENUE DEBIT

## 2019-03-21 PROCEDURE — 3331090002 HH PPS REVENUE DEBIT

## 2019-03-21 PROCEDURE — 3331090001 HH PPS REVENUE CREDIT

## 2019-03-22 ENCOUNTER — HOME CARE VISIT (OUTPATIENT)
Dept: SCHEDULING | Facility: HOME HEALTH | Age: 74
End: 2019-03-22
Payer: MEDICARE

## 2019-03-22 PROCEDURE — 3331090002 HH PPS REVENUE DEBIT

## 2019-03-22 PROCEDURE — 3331090001 HH PPS REVENUE CREDIT

## 2019-03-22 PROCEDURE — G0300 HHS/HOSPICE OF LPN EA 15 MIN: HCPCS

## 2019-03-23 PROCEDURE — 3331090002 HH PPS REVENUE DEBIT

## 2019-03-23 PROCEDURE — 3331090001 HH PPS REVENUE CREDIT

## 2019-03-24 VITALS
HEART RATE: 71 BPM | TEMPERATURE: 98 F | SYSTOLIC BLOOD PRESSURE: 118 MMHG | OXYGEN SATURATION: 99 % | DIASTOLIC BLOOD PRESSURE: 64 MMHG | RESPIRATION RATE: 18 BRPM

## 2019-03-24 PROCEDURE — 3331090002 HH PPS REVENUE DEBIT

## 2019-03-24 PROCEDURE — 3331090001 HH PPS REVENUE CREDIT

## 2019-03-25 ENCOUNTER — HOME CARE VISIT (OUTPATIENT)
Dept: SCHEDULING | Facility: HOME HEALTH | Age: 74
End: 2019-03-25
Payer: MEDICARE

## 2019-03-25 PROCEDURE — 3331090002 HH PPS REVENUE DEBIT

## 2019-03-25 PROCEDURE — 3331090001 HH PPS REVENUE CREDIT

## 2019-03-25 PROCEDURE — G0300 HHS/HOSPICE OF LPN EA 15 MIN: HCPCS

## 2019-03-26 VITALS
RESPIRATION RATE: 18 BRPM | HEART RATE: 63 BPM | DIASTOLIC BLOOD PRESSURE: 64 MMHG | OXYGEN SATURATION: 98 % | SYSTOLIC BLOOD PRESSURE: 117 MMHG

## 2019-03-26 PROCEDURE — 3331090002 HH PPS REVENUE DEBIT

## 2019-03-26 PROCEDURE — 3331090001 HH PPS REVENUE CREDIT

## 2019-03-27 ENCOUNTER — HOME CARE VISIT (OUTPATIENT)
Dept: SCHEDULING | Facility: HOME HEALTH | Age: 74
End: 2019-03-27
Payer: MEDICARE

## 2019-03-27 PROCEDURE — 3331090002 HH PPS REVENUE DEBIT

## 2019-03-27 PROCEDURE — G0300 HHS/HOSPICE OF LPN EA 15 MIN: HCPCS

## 2019-03-27 PROCEDURE — 3331090001 HH PPS REVENUE CREDIT

## 2019-03-28 VITALS
HEART RATE: 63 BPM | OXYGEN SATURATION: 99 % | RESPIRATION RATE: 18 BRPM | DIASTOLIC BLOOD PRESSURE: 64 MMHG | SYSTOLIC BLOOD PRESSURE: 110 MMHG

## 2019-03-28 PROCEDURE — 3331090002 HH PPS REVENUE DEBIT

## 2019-03-28 PROCEDURE — 3331090001 HH PPS REVENUE CREDIT

## 2019-03-29 ENCOUNTER — HOME CARE VISIT (OUTPATIENT)
Dept: HOME HEALTH SERVICES | Facility: HOME HEALTH | Age: 74
End: 2019-03-29
Payer: MEDICARE

## 2019-03-29 PROCEDURE — G0300 HHS/HOSPICE OF LPN EA 15 MIN: HCPCS

## 2019-03-29 PROCEDURE — 3331090002 HH PPS REVENUE DEBIT

## 2019-03-29 PROCEDURE — 3331090001 HH PPS REVENUE CREDIT

## 2019-03-30 PROCEDURE — 3331090002 HH PPS REVENUE DEBIT

## 2019-03-30 PROCEDURE — 3331090001 HH PPS REVENUE CREDIT

## 2019-03-31 PROCEDURE — 3331090002 HH PPS REVENUE DEBIT

## 2019-03-31 PROCEDURE — 3331090001 HH PPS REVENUE CREDIT

## 2019-04-01 ENCOUNTER — HOME CARE VISIT (OUTPATIENT)
Dept: SCHEDULING | Facility: HOME HEALTH | Age: 74
End: 2019-04-01
Payer: MEDICARE

## 2019-04-01 VITALS
SYSTOLIC BLOOD PRESSURE: 118 MMHG | OXYGEN SATURATION: 98 % | DIASTOLIC BLOOD PRESSURE: 72 MMHG | RESPIRATION RATE: 18 BRPM | TEMPERATURE: 97.9 F | HEART RATE: 65 BPM

## 2019-04-01 PROCEDURE — G0300 HHS/HOSPICE OF LPN EA 15 MIN: HCPCS

## 2019-04-01 PROCEDURE — 3331090002 HH PPS REVENUE DEBIT

## 2019-04-01 PROCEDURE — 3331090001 HH PPS REVENUE CREDIT

## 2019-04-02 PROCEDURE — 3331090002 HH PPS REVENUE DEBIT

## 2019-04-02 PROCEDURE — 3331090001 HH PPS REVENUE CREDIT

## 2019-04-03 ENCOUNTER — HOME CARE VISIT (OUTPATIENT)
Dept: SCHEDULING | Facility: HOME HEALTH | Age: 74
End: 2019-04-03
Payer: MEDICARE

## 2019-04-03 VITALS
HEART RATE: 64 BPM | RESPIRATION RATE: 18 BRPM | OXYGEN SATURATION: 97 % | SYSTOLIC BLOOD PRESSURE: 118 MMHG | DIASTOLIC BLOOD PRESSURE: 64 MMHG

## 2019-04-03 PROCEDURE — G0300 HHS/HOSPICE OF LPN EA 15 MIN: HCPCS

## 2019-04-03 PROCEDURE — 3331090001 HH PPS REVENUE CREDIT

## 2019-04-03 PROCEDURE — 3331090002 HH PPS REVENUE DEBIT

## 2019-04-04 PROCEDURE — 3331090001 HH PPS REVENUE CREDIT

## 2019-04-04 PROCEDURE — 3331090002 HH PPS REVENUE DEBIT

## 2019-04-05 ENCOUNTER — HOME CARE VISIT (OUTPATIENT)
Dept: SCHEDULING | Facility: HOME HEALTH | Age: 74
End: 2019-04-05
Payer: MEDICARE

## 2019-04-05 VITALS
SYSTOLIC BLOOD PRESSURE: 122 MMHG | HEART RATE: 80 BPM | OXYGEN SATURATION: 98 % | DIASTOLIC BLOOD PRESSURE: 80 MMHG | TEMPERATURE: 97 F | RESPIRATION RATE: 20 BRPM

## 2019-04-05 PROCEDURE — G0299 HHS/HOSPICE OF RN EA 15 MIN: HCPCS

## 2019-04-05 PROCEDURE — 3331090003 HH PPS REVENUE ADJ

## 2019-04-05 PROCEDURE — 3331090001 HH PPS REVENUE CREDIT

## 2019-04-05 PROCEDURE — 3331090002 HH PPS REVENUE DEBIT

## 2019-04-06 PROCEDURE — 3331090002 HH PPS REVENUE DEBIT

## 2019-04-06 PROCEDURE — 3331090001 HH PPS REVENUE CREDIT

## 2019-04-08 VITALS
HEART RATE: 67 BPM | DIASTOLIC BLOOD PRESSURE: 64 MMHG | OXYGEN SATURATION: 98 % | SYSTOLIC BLOOD PRESSURE: 117 MMHG | RESPIRATION RATE: 18 BRPM

## 2019-04-12 ENCOUNTER — OFFICE VISIT (OUTPATIENT)
Dept: SURGERY | Age: 74
End: 2019-04-12

## 2019-04-12 VITALS
BODY MASS INDEX: 35.58 KG/M2 | WEIGHT: 221.4 LBS | SYSTOLIC BLOOD PRESSURE: 144 MMHG | DIASTOLIC BLOOD PRESSURE: 86 MMHG | HEIGHT: 66 IN | TEMPERATURE: 97.5 F | OXYGEN SATURATION: 97 % | RESPIRATION RATE: 20 BRPM | HEART RATE: 64 BPM

## 2019-04-12 DIAGNOSIS — S21.001D BREAST WOUND, RIGHT, SUBSEQUENT ENCOUNTER: Primary | ICD-10-CM

## 2019-04-12 NOTE — PROGRESS NOTES
Chief Complaint   Patient presents with    Follow-up     4 week f/u breast wound     1. Have you been to the ER, urgent care clinic since your last visit? Hospitalized since your last visit? No    2. Have you seen or consulted any other health care providers outside of the [address] [place] [address] since your last visit? Include any pap smears or colon screening.  No

## 2019-04-12 NOTE — PROGRESS NOTES
HISTORY OF PRESENT ILLNESS  Ally Agudelo is a 72 y.o. female who comes in for follow up for a chronic breast wound  HPI    She had undergone a right breast biopsy and developed a large hematoma. This got infected and she had a very large debridement 12/3/2019. She was managed with a wound VAC but still had problems with infection. She was changed to Dakins dressing changes and then back to saline dressing changes and the aquacell Ag. She reports some drainage and odor but no pain, fever, chills or sweats. Past Medical History:   Diagnosis Date    Arrhythmia     Atrial Fibrillation    CAD (coronary artery disease)     Diabetes ([facility] [address]     Hypertension      Past Surgical History:   Procedure Laterality Date    BREAST SURGERY PROCEDURE UNLISTED      left breast bx- benign    CARDIAC SURG PROCEDURE UNLIST   or     Coronary Stent    HX GASTRIC BYPASS      HX HYSTERECTOMY      HX LAP CHOLECYSTECTOMY      HX MASTECTOMY Right 2018    RIGHT BREAST BIOPSY WITH NEEDLE LOCALIZATION (1030) performed by Guillermina Rogers MD at [address] [place] [address] HX MASTECTOMY Right 2018     Incision and drainage of right breast infected hematoma with abscess. Family History   Problem Relation Age of Onset    Cancer Mother 72        lung     Social History     Tobacco Use    Smoking status: Former Smoker     Last attempt to quit: 1992     Years since quittin.4    Smokeless tobacco: Never Used   Substance Use Topics    Alcohol use: Yes     Comment: oc    Drug use: No     Current Outpatient Medications   Medication Sig    hydrocortisone butyrate (LOCOID) 0.1 % topical cream Apply  to affected area two (2) times a day.  miconazole (MONISTAT 7) 2 % vaginal cream Insert 1 Applicator into vagina nightly.  metFORMIN (GLUCOPHAGE) 500 mg tablet Take 500 mg by mouth two (2) times daily (with meals).     lisinopril-hydroCHLOROthiazide (PRINZIDE, ZESTORETIC) 20-12.5 mg per tablet Take 1 Tab by mouth daily.  simvastatin (ZOCOR) 40 mg tablet Take 40 mg by mouth nightly.  metoprolol tartrate (LOPRESSOR) 50 mg tablet Take 50 mg by mouth daily.  amoxicillin/potassium clav (AMOXICILLIN-POT CLAVULANATE PO) Take  by mouth.  Liraglutide (VICTOZA 3-LUIS) 0.6 mg/0.1 mL (18 mg/3 mL) pnij 0.6 mg by SubCUTAneous route. No current facility-administered medications for this visit. No Known Allergies    Review of Systems   Constitutional: Negative for chills, diaphoresis, fever and weight loss. HENT: Negative for sore throat. Eyes: Negative for blurred vision and discharge. Respiratory: Negative for cough, shortness of breath and wheezing. Cardiovascular: Negative for chest pain, palpitations, orthopnea, claudication and leg swelling. Gastrointestinal: Negative for abdominal pain, constipation, diarrhea, heartburn, melena, nausea and vomiting. Genitourinary: Negative for dysuria, flank pain, frequency and hematuria. Musculoskeletal: Negative for back pain, joint pain, myalgias and neck pain. Skin: Negative for rash. Neurological: Negative for dizziness, speech change, focal weakness, seizures, loss of consciousness, weakness and headaches. Endo/Heme/Allergies: Does not bruise/bleed easily. Psychiatric/Behavioral: Negative for depression and memory loss. Visit Vitals  /86 (BP 1 Location: Right arm, BP Patient Position: Sitting)   Pulse 64   Temp 97.5 °F (36.4 °C)   Resp 20   Ht 5' 6\" (1.676 m)   Wt 100.4 kg (221 lb 6.4 oz)   SpO2 97%   BMI 35.73 kg/m²       Physical Exam   Constitutional: She is oriented to person, place, and time. She appears well-developed and well-nourished. No distress. HENT:   Head: Normocephalic and atraumatic. Mouth/Throat: Oropharynx is clear and moist. No oropharyngeal exudate. Eyes: Pupils are equal, round, and reactive to light. Conjunctivae and EOM are normal. No scleral icterus. Neck: Normal range of motion. Neck supple.  No JVD present. No tracheal deviation present. No thyromegaly present. Cardiovascular: Normal rate and regular rhythm. Exam reveals no gallop and no friction rub. No murmur heard. Pulmonary/Chest: Effort normal and breath sounds normal. No respiratory distress. She has no wheezes. She has no rales. Right breast exhibits skin change (wound closing with excellent granulation tissue 1 x 6 cm). Right breast exhibits no inverted nipple, no mass, no nipple discharge and no tenderness. Left breast exhibits no inverted nipple, no mass, no nipple discharge, no skin change and no tenderness. Breasts are symmetrical.       Abdominal: Soft. Bowel sounds are normal. She exhibits no distension and no mass. There is no tenderness. There is no rebound and no guarding. Musculoskeletal: Normal range of motion. She exhibits no edema. Lymphadenopathy:     She has no cervical adenopathy. She has no axillary adenopathy. Right: No supraclavicular adenopathy present. Left: No supraclavicular adenopathy present. Neurological: She is alert and oriented to person, place, and time. No cranial nerve deficit. Skin: Skin is warm and dry. No rash noted. She is not diaphoretic. No erythema. No pallor. Psychiatric: Her behavior is normal. Judgment and thought content normal.       ASSESSMENT and PLAN  1. Chronic right breast wound. Slowly healing. No infection at present.   The upper skin flap has attached to the base of the wound more like a marsupialization and may require surgical intervention but would like more healing first.  2.  Social.  Now lives in [place] [address] wound care daily  RTC 4 weeks    Shira Stover MD FACS

## 2019-05-10 ENCOUNTER — OFFICE VISIT (OUTPATIENT)
Dept: SURGERY | Age: 74
End: 2019-05-10

## 2019-05-10 VITALS
DIASTOLIC BLOOD PRESSURE: 74 MMHG | RESPIRATION RATE: 20 BRPM | WEIGHT: 218 LBS | HEIGHT: 66 IN | OXYGEN SATURATION: 100 % | BODY MASS INDEX: 35.03 KG/M2 | HEART RATE: 56 BPM | SYSTOLIC BLOOD PRESSURE: 129 MMHG | TEMPERATURE: 97.8 F

## 2019-05-10 DIAGNOSIS — S21.001D BREAST WOUND, RIGHT, SUBSEQUENT ENCOUNTER: Primary | ICD-10-CM

## 2019-05-10 NOTE — PROGRESS NOTES
HISTORY OF PRESENT ILLNESS  James Rasheed is a 72 y.o. female who comes in for follow up for a chronic breast wound  HPI    She had undergone a right breast biopsy and developed a large hematoma. This got infected and she had a very large debridement 12/3/2019. She was managed with a wound VAC but still had problems with infection. She was changed to Dakins dressing changes and then back to saline dressing changes and the aquacell Ag. She reports some drainage and odor but no pain, fever, chills or sweats. Past Medical History:   Diagnosis Date    Arrhythmia     Atrial Fibrillation    CAD (coronary artery disease)     Diabetes ([address]     Hypertension      Past Surgical History:   Procedure Laterality Date    BREAST SURGERY PROCEDURE UNLISTED      left breast bx- benign    CARDIAC SURG PROCEDURE UNLIST   or     Coronary Stent    HX GASTRIC BYPASS      HX HYSTERECTOMY      HX LAP CHOLECYSTECTOMY      HX MASTECTOMY Right 2018    RIGHT BREAST BIOPSY WITH NEEDLE LOCALIZATION (1030) performed by China Garcia MD at [address] [place] [address] HX MASTECTOMY Right 2018     Incision and drainage of right breast infected hematoma with abscess. Family History   Problem Relation Age of Onset    Cancer Mother 72        lung     Social History     Tobacco Use    Smoking status: Former Smoker     Last attempt to quit: 1992     Years since quittin.5    Smokeless tobacco: Never Used   Substance Use Topics    Alcohol use: Yes     Comment: oc    Drug use: No     Current Outpatient Medications   Medication Sig    metFORMIN (GLUCOPHAGE) 500 mg tablet Take 500 mg by mouth two (2) times daily (with meals).  lisinopril-hydroCHLOROthiazide (PRINZIDE, ZESTORETIC) 20-12.5 mg per tablet Take 1 Tab by mouth daily.  simvastatin (ZOCOR) 40 mg tablet Take 40 mg by mouth nightly.  metoprolol tartrate (LOPRESSOR) 50 mg tablet Take 50 mg by mouth daily.     amoxicillin/potassium clav (AMOXICILLIN-POT CLAVULANATE PO) Take  by mouth.  hydrocortisone butyrate (LOCOID) 0.1 % topical cream Apply  to affected area two (2) times a day.  miconazole (MONISTAT 7) 2 % vaginal cream Insert 1 Applicator into vagina nightly.  Liraglutide (VICTOZA 3-LUIS) 0.6 mg/0.1 mL (18 mg/3 mL) pnij 0.6 mg by SubCUTAneous route. No current facility-administered medications for this visit. No Known Allergies    Review of Systems   Constitutional: Negative for chills, diaphoresis, fever and weight loss. HENT: Negative for sore throat. Eyes: Negative for blurred vision and discharge. Respiratory: Negative for cough, shortness of breath and wheezing. Cardiovascular: Negative for chest pain, palpitations, orthopnea, claudication and leg swelling. Gastrointestinal: Negative for abdominal pain, constipation, diarrhea, heartburn, melena, nausea and vomiting. Genitourinary: Negative for dysuria, flank pain, frequency and hematuria. Musculoskeletal: Negative for back pain, joint pain, myalgias and neck pain. Skin: Negative for rash. Neurological: Negative for dizziness, speech change, focal weakness, seizures, loss of consciousness, weakness and headaches. Endo/Heme/Allergies: Does not bruise/bleed easily. Psychiatric/Behavioral: Negative for depression and memory loss. Visit Vitals  /74 (BP 1 Location: Left arm, BP Patient Position: Sitting)   Pulse (!) 56   Temp 97.8 °F (36.6 °C)   Resp 20   Ht 5' 6\" (1.676 m)   Wt 98.9 kg (218 lb)   SpO2 100%   BMI 35.19 kg/m²       Physical Exam   Constitutional: She is oriented to person, place, and time. She appears well-developed and well-nourished. No distress. HENT:   Head: Normocephalic and atraumatic. Mouth/Throat: Oropharynx is clear and moist. No oropharyngeal exudate. Eyes: Pupils are equal, round, and reactive to light. Conjunctivae and EOM are normal. No scleral icterus. Neck: Normal range of motion. Neck supple.  No JVD present. No tracheal deviation present. No thyromegaly present. Cardiovascular: Normal rate and regular rhythm. Exam reveals no gallop and no friction rub. No murmur heard. Pulmonary/Chest: Effort normal and breath sounds normal. No respiratory distress. She has no wheezes. She has no rales. Right breast exhibits skin change (wound closing with excellent granulation tissue 1 x 6 cm). Right breast exhibits no inverted nipple, no mass, no nipple discharge and no tenderness. Left breast exhibits no inverted nipple, no mass, no nipple discharge, no skin change and no tenderness. Breasts are symmetrical.       Abdominal: Soft. Bowel sounds are normal. She exhibits no distension and no mass. There is no tenderness. There is no rebound and no guarding. Musculoskeletal: Normal range of motion. She exhibits no edema. Lymphadenopathy:     She has no cervical adenopathy. She has no axillary adenopathy. Right: No supraclavicular adenopathy present. Left: No supraclavicular adenopathy present. Neurological: She is alert and oriented to person, place, and time. No cranial nerve deficit. Skin: Skin is warm and dry. No rash noted. She is not diaphoretic. No erythema. No pallor. Psychiatric: Her behavior is normal. Judgment and thought content normal.       ASSESSMENT and PLAN  1. Chronic right breast wound. Slowly healing but now stagnating. No infection at present.   The upper skin flap has attached to the base of the wound more like a marsupialization and may require surgical intervention but would like more healing first.  2.  Social.  Now lives in [facility] [phone] wound care daily with dry gauze  RTC 2 weeks if not improving then will take to [id number] MD Solange FACS

## 2019-05-10 NOTE — PROGRESS NOTES
Chief Complaint   Patient presents with    Follow-up     f/u breast wound       1. Have you been to the ER, urgent care clinic since your last visit? Hospitalized since your last visit? No    2. Have you seen or consulted any other health care providers outside of the [address] [place] [address] since your last visit? Include any pap smears or colon screening.  No

## 2019-05-29 ENCOUNTER — OFFICE VISIT (OUTPATIENT)
Dept: SURGERY | Age: 74
End: 2019-05-29

## 2019-05-29 VITALS
OXYGEN SATURATION: 97 % | HEIGHT: 66 IN | SYSTOLIC BLOOD PRESSURE: 111 MMHG | RESPIRATION RATE: 16 BRPM | TEMPERATURE: 97.5 F | DIASTOLIC BLOOD PRESSURE: 72 MMHG | WEIGHT: 214.7 LBS | BODY MASS INDEX: 34.51 KG/M2 | HEART RATE: 73 BPM

## 2019-05-29 DIAGNOSIS — S21.001D BREAST WOUND, RIGHT, SUBSEQUENT ENCOUNTER: Primary | ICD-10-CM

## 2019-05-29 RX ORDER — NITROGLYCERIN 0.4 MG/1
TABLET SUBLINGUAL AS NEEDED
COMMUNITY
Start: 2017-02-24

## 2019-05-29 NOTE — PROGRESS NOTES
HISTORY OF PRESENT ILLNESS  Trice Bangura is a 72 y.o. female who comes in for follow up for a chronic breast wound  HPI    She had undergone a right breast biopsy and developed a large hematoma. This got infected and she had a very large debridement 12/3/2019. She was managed with a wound VAC but still had problems with infection. She was changed to Dakins dressing changes and then back to saline dressing changes and the aquacell Ag. She reports some drainage and odor but no pain, fever, chills or sweats. Past Medical History:   Diagnosis Date    Arrhythmia     Atrial Fibrillation    CAD (coronary artery disease)     Diabetes ([address]     Hypertension      Past Surgical History:   Procedure Laterality Date    BREAST SURGERY PROCEDURE UNLISTED      left breast bx- benign    CARDIAC SURG PROCEDURE UNLIST   or     Coronary Stent    HX GASTRIC BYPASS      HX HYSTERECTOMY      HX LAP CHOLECYSTECTOMY      HX MASTECTOMY Right 2018    RIGHT BREAST BIOPSY WITH NEEDLE LOCALIZATION (1030) performed by Enrique Ford MD at [address] [place] [address] HX MASTECTOMY Right 2018     Incision and drainage of right breast infected hematoma with abscess. Family History   Problem Relation Age of Onset    Cancer Mother 72        lung     Social History     Tobacco Use    Smoking status: Former Smoker     Last attempt to quit: 1992     Years since quittin.5    Smokeless tobacco: Never Used   Substance Use Topics    Alcohol use: Yes     Comment: oc    Drug use: No     Current Outpatient Medications   Medication Sig    nitroglycerin (NITROSTAT) 0.4 mg SL tablet     miconazole (MONISTAT 7) 2 % vaginal cream Insert 1 Applicator into vagina nightly.  metFORMIN (GLUCOPHAGE) 500 mg tablet Take 500 mg by mouth two (2) times daily (with meals).  lisinopril-hydroCHLOROthiazide (PRINZIDE, ZESTORETIC) 20-12.5 mg per tablet Take 1 Tab by mouth daily.     simvastatin (ZOCOR) 40 mg tablet Take 40 mg by mouth nightly.  metoprolol tartrate (LOPRESSOR) 50 mg tablet Take 50 mg by mouth daily.  hydrocortisone butyrate (LOCOID) 0.1 % topical cream Apply  to affected area two (2) times a day.  Liraglutide (VICTOZA 3-LUIS) 0.6 mg/0.1 mL (18 mg/3 mL) pnij 0.6 mg by SubCUTAneous route. No current facility-administered medications for this visit. No Known Allergies    Review of Systems   Constitutional: Negative for chills, diaphoresis, fever and weight loss. HENT: Negative for sore throat. Eyes: Negative for blurred vision and discharge. Respiratory: Negative for cough, shortness of breath and wheezing. Cardiovascular: Negative for chest pain, palpitations, orthopnea, claudication and leg swelling. Gastrointestinal: Negative for abdominal pain, constipation, diarrhea, heartburn, melena, nausea and vomiting. Genitourinary: Negative for dysuria, flank pain, frequency and hematuria. Musculoskeletal: Negative for back pain, joint pain, myalgias and neck pain. Skin: Negative for rash. Neurological: Negative for dizziness, speech change, focal weakness, seizures, loss of consciousness, weakness and headaches. Endo/Heme/Allergies: Does not bruise/bleed easily. Psychiatric/Behavioral: Negative for depression and memory loss. Visit Vitals  /72 (BP 1 Location: Left arm, BP Patient Position: Sitting)   Pulse 73   Temp 97.5 °F (36.4 °C) (Oral)   Resp 16   Ht 5' 6\" (1.676 m)   Wt 97.4 kg (214 lb 11.2 oz)   SpO2 97%   BMI 34.65 kg/m²       Physical Exam   Constitutional: She is oriented to person, place, and time. She appears well-developed and well-nourished. No distress. HENT:   Head: Normocephalic and atraumatic. Mouth/Throat: Oropharynx is clear and moist. No oropharyngeal exudate. Eyes: Pupils are equal, round, and reactive to light. Conjunctivae and EOM are normal. No scleral icterus. Neck: Normal range of motion. Neck supple. No JVD present.  No tracheal deviation present. No thyromegaly present. Cardiovascular: Normal rate and regular rhythm. Exam reveals no gallop and no friction rub. No murmur heard. Pulmonary/Chest: Effort normal and breath sounds normal. No respiratory distress. She has no wheezes. She has no rales. Right breast exhibits skin change (wound closing  1 x 1 x 0.8 cm with some proud flesh). Right breast exhibits no inverted nipple, no mass, no nipple discharge and no tenderness. Left breast exhibits no inverted nipple, no mass, no nipple discharge, no skin change and no tenderness. Breasts are symmetrical.       Abdominal: Soft. Bowel sounds are normal. She exhibits no distension and no mass. There is no tenderness. There is no rebound and no guarding. Musculoskeletal: Normal range of motion. She exhibits no edema. Lymphadenopathy:     She has no cervical adenopathy. She has no axillary adenopathy. Right: No supraclavicular adenopathy present. Left: No supraclavicular adenopathy present. Neurological: She is alert and oriented to person, place, and time. No cranial nerve deficit. Skin: Skin is warm and dry. No rash noted. She is not diaphoretic. No erythema. No pallor. Psychiatric: Her behavior is normal. Judgment and thought content normal.       ASSESSMENT and PLAN  1. Chronic right breast wound. Slowly healing  AgNO3 to proud flesh today  I think it is making more progress again  2.   Social.  Now lives in [facility] [phone] wound care daily with dry gauze  RTC 4 weeks    Daryl Fontaine MD FACS

## 2019-05-29 NOTE — PROGRESS NOTES
Chief Complaint   Patient presents with    Breast Problem     2w f/u     1. Have you been to the ER, urgent care clinic since your last visit? Hospitalized since your last visit? No    2. Have you seen or consulted any other health care providers outside of the [address] [place] [address] since your last visit? Include any pap smears or colon screening.  No

## 2019-06-28 ENCOUNTER — OFFICE VISIT (OUTPATIENT)
Dept: SURGERY | Age: 74
End: 2019-06-28

## 2019-06-28 VITALS
HEIGHT: 66 IN | OXYGEN SATURATION: 95 % | DIASTOLIC BLOOD PRESSURE: 88 MMHG | WEIGHT: 214.9 LBS | BODY MASS INDEX: 34.54 KG/M2 | RESPIRATION RATE: 20 BRPM | HEART RATE: 62 BPM | SYSTOLIC BLOOD PRESSURE: 145 MMHG

## 2019-06-28 DIAGNOSIS — S21.001D BREAST WOUND, RIGHT, SUBSEQUENT ENCOUNTER: Primary | ICD-10-CM

## 2019-06-28 NOTE — PROGRESS NOTES
Chief Complaint   Patient presents with    Breast Problem     SP  Incision and drainage of right breast infected hematoma with abscess. 12/03/2019     1. Have you been to the ER, urgent care clinic since your last visit? Hospitalized since your last visit? No    2. Have you seen or consulted any other health care providers outside of the [address] [place] [address] since your last visit? Include any pap smears or colon screening.  No

## 2019-06-28 NOTE — PROGRESS NOTES
HISTORY OF PRESENT ILLNESS  Stoney Wills is a 72 y.o. female who comes in for follow up for a chronic breast wound  HPI    She had undergone a right breast biopsy and developed a large hematoma. This got infected and she had a very large debridement 12/3/2019. She was managed with a wound VAC but still had problems with infection. She was changed to Dakins dressing changes and then back to saline dressing changes and the aquacell Ag. She reports some drainage and odor but no pain, fever, chills or sweats. Past Medical History:   Diagnosis Date    Arrhythmia     Atrial Fibrillation    CAD (coronary artery disease)     Diabetes ([address]     Hypertension      Past Surgical History:   Procedure Laterality Date    BREAST SURGERY PROCEDURE UNLISTED      left breast bx- benign    CARDIAC SURG PROCEDURE UNLIST   or     Coronary Stent    HX GASTRIC BYPASS      HX HYSTERECTOMY      HX LAP CHOLECYSTECTOMY      HX MASTECTOMY Right 2018    RIGHT BREAST BIOPSY WITH NEEDLE LOCALIZATION (1030) performed by Ese Choi MD at [email or web address] HX MASTECTOMY Right 2018     Incision and drainage of right breast infected hematoma with abscess. Family History   Problem Relation Age of Onset    Cancer Mother 72        lung     Social History     Tobacco Use    Smoking status: Former Smoker     Last attempt to quit: 1992     Years since quittin.6    Smokeless tobacco: Never Used   Substance Use Topics    Alcohol use: Yes     Comment: oc    Drug use: No     Current Outpatient Medications   Medication Sig    metFORMIN (GLUCOPHAGE) 500 mg tablet Take 500 mg by mouth two (2) times daily (with meals).  lisinopril-hydroCHLOROthiazide (PRINZIDE, ZESTORETIC) 20-12.5 mg per tablet Take 1 Tab by mouth daily.  simvastatin (ZOCOR) 40 mg tablet Take 40 mg by mouth nightly.  metoprolol tartrate (LOPRESSOR) 50 mg tablet Take 50 mg by mouth daily.     nitroglycerin (NITROSTAT) 0.4 mg SL tablet     hydrocortisone butyrate (LOCOID) 0.1 % topical cream Apply  to affected area two (2) times a day.  miconazole (MONISTAT 7) 2 % vaginal cream Insert 1 Applicator into vagina nightly.  Liraglutide (VICTOZA 3-LUIS) 0.6 mg/0.1 mL (18 mg/3 mL) pnij 0.6 mg by SubCUTAneous route. No current facility-administered medications for this visit. No Known Allergies    Review of Systems   Constitutional: Negative for chills, diaphoresis, fever and weight loss. HENT: Negative for sore throat. Eyes: Negative for blurred vision and discharge. Respiratory: Negative for cough, shortness of breath and wheezing. Cardiovascular: Negative for chest pain, palpitations, orthopnea, claudication and leg swelling. Gastrointestinal: Negative for abdominal pain, constipation, diarrhea, heartburn, melena, nausea and vomiting. Genitourinary: Negative for dysuria, flank pain, frequency and hematuria. Musculoskeletal: Negative for back pain, joint pain, myalgias and neck pain. Skin: Negative for rash. Neurological: Negative for dizziness, speech change, focal weakness, seizures, loss of consciousness, weakness and headaches. Endo/Heme/Allergies: Does not bruise/bleed easily. Psychiatric/Behavioral: Negative for depression and memory loss. Visit Vitals  /88 (BP 1 Location: Right arm, BP Patient Position: Sitting)   Pulse 62   Resp 20   Ht 5' 6\" (1.676 m)   Wt 97.5 kg (214 lb 14.4 oz)   SpO2 95%   BMI 34.69 kg/m²       Physical Exam   Constitutional: She is oriented to person, place, and time. She appears well-developed and well-nourished. No distress. HENT:   Head: Normocephalic and atraumatic. Mouth/Throat: Oropharynx is clear and moist. No oropharyngeal exudate. Eyes: Pupils are equal, round, and reactive to light. Conjunctivae and EOM are normal. No scleral icterus. Neck: Normal range of motion. Neck supple. No JVD present. No tracheal deviation present.  No thyromegaly present. Cardiovascular: Normal rate and regular rhythm. Exam reveals no gallop and no friction rub. No murmur heard. Pulmonary/Chest: Effort normal and breath sounds normal. No respiratory distress. She has no wheezes. She has no rales. Right breast exhibits skin change (wound closing  1 x 1 x 0.8 cm with some proud flesh). Right breast exhibits no inverted nipple, no mass, no nipple discharge and no tenderness. Left breast exhibits no inverted nipple, no mass, no nipple discharge, no skin change and no tenderness. Breasts are symmetrical.       Abdominal: Soft. Bowel sounds are normal. She exhibits no distension and no mass. There is no tenderness. There is no rebound and no guarding. Musculoskeletal: Normal range of motion. She exhibits no edema. Lymphadenopathy:     She has no cervical adenopathy. She has no axillary adenopathy. Right: No supraclavicular adenopathy present. Left: No supraclavicular adenopathy present. Neurological: She is alert and oriented to person, place, and time. No cranial nerve deficit. Skin: Skin is warm and dry. No rash noted. She is not diaphoretic. No erythema. No pallor. Psychiatric: Her behavior is normal. Judgment and thought content normal.       ASSESSMENT and PLAN  1. Chronic right breast wound. Slowly healing  AgNO3 to proud flesh today  I think it is making more progress again  2. Social.  Now lives in [address] [facility]  3. NIDDM type 2. On oral agents  4. Essential hypertension. Stable on rx  5. Severe obesity   Body mass index is 34.69 kg/m².   Recommend diet modification and exercise  Change wound care daily with dry gauze  RTC 2 weeks    Dalia Everett MD FACS

## 2019-07-15 ENCOUNTER — OFFICE VISIT (OUTPATIENT)
Dept: SURGERY | Age: 74
End: 2019-07-15

## 2019-07-15 VITALS
DIASTOLIC BLOOD PRESSURE: 73 MMHG | OXYGEN SATURATION: 97 % | RESPIRATION RATE: 14 BRPM | HEART RATE: 64 BPM | BODY MASS INDEX: 34.28 KG/M2 | SYSTOLIC BLOOD PRESSURE: 127 MMHG | HEIGHT: 66 IN | WEIGHT: 213.3 LBS | TEMPERATURE: 96.2 F

## 2019-07-15 DIAGNOSIS — S21.001D BREAST WOUND, RIGHT, SUBSEQUENT ENCOUNTER: Primary | ICD-10-CM

## 2019-07-15 NOTE — PROGRESS NOTES
Mary Arreaga is a 69 y.o. female     Chief Complaint   Patient presents with    Breast Problem     2 wk follow up       Visit Vitals  /73 (BP 1 Location: Left arm, BP Patient Position: Sitting)   Pulse 64   Temp 96.2 °F (35.7 °C) (Oral)   Resp 14   Ht 5' 6\" (1.676 m)   Wt 213 lb 4.8 oz (96.8 kg)   SpO2 97%   BMI 34.43 kg/m²       Health Maintenance Due   Topic Date Due    Hepatitis C Screening  1945    DTaP/Tdap/Td series (1 - Tdap) 07/11/1968    Shingrix Vaccine Age 50> (1 of 2) 07/11/1997    GLAUCOMA SCREENING Q2Y  07/11/2012    Pneumococcal 65+ years (1 of 2 - PCV13) 07/11/2012    MEDICARE YEARLY EXAM  03/20/2018       1. Have you been to the ER, urgent care clinic since your last visit? Hospitalized since your last visit? No    2. Have you seen or consulted any other health care providers outside of the [address] [place] [address] since your last visit? Include any pap smears or colon screening.  No

## 2019-07-15 NOTE — PROGRESS NOTES
HISTORY OF PRESENT ILLNESS  Nichol Roman is a 69 y.o. female who comes in for follow up for a chronic breast wound  Breast Problem   Pertinent negatives include no chest pain, no abdominal pain, no headaches and no shortness of breath. She had undergone a right breast biopsy and developed a large hematoma. This got infected and she had a very large debridement 12/3/2019. She was managed with a wound VAC but still had problems with infection. She was changed to Dakins dressing changes and then back to saline dressing changes and the aquacell Ag. She reports some drainage and odor but no pain, fever, chills or sweats. Past Medical History:   Diagnosis Date    Arrhythmia     Atrial Fibrillation    CAD (coronary artery disease)     Diabetes ([facility] [address]     Hypertension      Past Surgical History:   Procedure Laterality Date    BREAST SURGERY PROCEDURE UNLISTED      left breast bx- benign    CARDIAC SURG PROCEDURE UNLIST   or     Coronary Stent    HX GASTRIC BYPASS      HX HYSTERECTOMY      HX LAP CHOLECYSTECTOMY      HX MASTECTOMY Right 2018    RIGHT BREAST BIOPSY WITH NEEDLE LOCALIZATION (1030) performed by Kenny Corral MD at [address] HX MASTECTOMY Right 2018     Incision and drainage of right breast infected hematoma with abscess. Family History   Problem Relation Age of Onset    Cancer Mother 72        lung     Social History     Tobacco Use    Smoking status: Former Smoker     Last attempt to quit: 1992     Years since quittin.7    Smokeless tobacco: Never Used   Substance Use Topics    Alcohol use: Yes     Comment: oc    Drug use: No     Current Outpatient Medications   Medication Sig    nitroglycerin (NITROSTAT) 0.4 mg SL tablet     hydrocortisone butyrate (LOCOID) 0.1 % topical cream Apply  to affected area two (2) times a day.  miconazole (MONISTAT 7) 2 % vaginal cream Insert 1 Applicator into vagina nightly.     metFORMIN (GLUCOPHAGE) 500 mg tablet Take 500 mg by mouth two (2) times daily (with meals).  lisinopril-hydroCHLOROthiazide (PRINZIDE, ZESTORETIC) 20-12.5 mg per tablet Take 1 Tab by mouth daily.  simvastatin (ZOCOR) 40 mg tablet Take 40 mg by mouth nightly.  metoprolol tartrate (LOPRESSOR) 50 mg tablet Take 50 mg by mouth daily.  Liraglutide (VICTOZA 3-LUIS) 0.6 mg/0.1 mL (18 mg/3 mL) pnij 0.6 mg by SubCUTAneous route. No current facility-administered medications for this visit. No Known Allergies    Review of Systems   Constitutional: Negative for chills, diaphoresis, fever and weight loss. HENT: Negative for sore throat. Eyes: Negative for blurred vision and discharge. Respiratory: Negative for cough, shortness of breath and wheezing. Cardiovascular: Negative for chest pain, palpitations, orthopnea, claudication and leg swelling. Gastrointestinal: Negative for abdominal pain, constipation, diarrhea, heartburn, melena, nausea and vomiting. Genitourinary: Negative for dysuria, flank pain, frequency and hematuria. Musculoskeletal: Negative for back pain, joint pain, myalgias and neck pain. Skin: Negative for rash. Neurological: Negative for dizziness, speech change, focal weakness, seizures, loss of consciousness, weakness and headaches. Endo/Heme/Allergies: Does not bruise/bleed easily. Psychiatric/Behavioral: Negative for depression and memory loss. Visit Vitals  /73 (BP 1 Location: Left arm, BP Patient Position: Sitting)   Pulse 64   Temp 96.2 °F (35.7 °C) (Oral)   Resp 14   Ht 5' 6\" (1.676 m)   Wt 96.8 kg (213 lb 4.8 oz)   SpO2 97%   BMI 34.43 kg/m²       Physical Exam   Constitutional: She is oriented to person, place, and time. She appears well-developed and well-nourished. No distress. HENT:   Head: Normocephalic and atraumatic. Mouth/Throat: Oropharynx is clear and moist. No oropharyngeal exudate. Eyes: Pupils are equal, round, and reactive to light. Conjunctivae and EOM are normal. No scleral icterus. Neck: Normal range of motion. Neck supple. No JVD present. No tracheal deviation present. No thyromegaly present. Cardiovascular: Normal rate and regular rhythm. Exam reveals no gallop and no friction rub. No murmur heard. Pulmonary/Chest: Effort normal and breath sounds normal. No respiratory distress. She has no wheezes. She has no rales. Right breast exhibits skin change (wound closing  0.8 x 0.6 x 0.6cm with some proud flesh ). Right breast exhibits no inverted nipple, no mass, no nipple discharge and no tenderness. Left breast exhibits no inverted nipple, no mass, no nipple discharge, no skin change and no tenderness. Breasts are symmetrical.       Abdominal: Soft. Bowel sounds are normal. She exhibits no distension and no mass. There is no tenderness. There is no rebound and no guarding. Musculoskeletal: Normal range of motion. She exhibits no edema. Lymphadenopathy:     She has no cervical adenopathy. She has no axillary adenopathy. Right: No supraclavicular adenopathy present. Left: No supraclavicular adenopathy present. Neurological: She is alert and oriented to person, place, and time. No cranial nerve deficit. Skin: Skin is warm and dry. No rash noted. She is not diaphoretic. No erythema. No pallor. Psychiatric: She has a normal mood and affect. Her behavior is normal. Judgment and thought content normal.       ASSESSMENT and PLAN  1. Chronic right breast wound. Slowly healing  AgNO3 to proud flesh today  I think it is making more progress again  2. Social.  Now lives in [address] [facility]  3. NIDDM type 2. On oral agents  4. Essential hypertension. Stable on rx  5. Severe obesity   Body mass index is 34.43 kg/m².   Recommend diet modification and exercise  Change wound care daily with dry gauze  RTC 2 weeks    Prosper Spaulding MD FACS

## 2019-08-30 ENCOUNTER — OFFICE VISIT (OUTPATIENT)
Dept: SURGERY | Age: 74
End: 2019-08-30

## 2019-08-30 VITALS
TEMPERATURE: 97.4 F | OXYGEN SATURATION: 97 % | HEIGHT: 66 IN | SYSTOLIC BLOOD PRESSURE: 141 MMHG | WEIGHT: 216 LBS | BODY MASS INDEX: 34.72 KG/M2 | HEART RATE: 63 BPM | RESPIRATION RATE: 20 BRPM | DIASTOLIC BLOOD PRESSURE: 80 MMHG

## 2019-08-30 DIAGNOSIS — S21.001D BREAST WOUND, RIGHT, SUBSEQUENT ENCOUNTER: Primary | ICD-10-CM

## 2019-08-30 NOTE — PROGRESS NOTES
Chief Complaint   Patient presents with    Breast Problem     S/P  Incision and drainage of right breast infected hematoma with abscess. 12/03/2018     1. Have you been to the ER, urgent care clinic since your last visit? Hospitalized since your last visit? No    2. Have you seen or consulted any other health care providers outside of the [address] [place] [address] since your last visit? Include any pap smears or colon screening.  No

## 2019-08-30 NOTE — PROGRESS NOTES
HISTORY OF PRESENT ILLNESS  Kaushik Bill is a 69 y.o. female who comes in for follow up for a chronic breast wound  Breast Problem   Pertinent negatives include no chest pain, no abdominal pain, no headaches and no shortness of breath. She had undergone a right breast biopsy and developed a large hematoma. This got infected and she had a very large debridement 12/3/2019. She was managed with a wound VAC but still had problems with infection. She was changed to Dakins dressing changes and then back to saline dressing changes and the aquacell Ag. She reports some drainage and odor but no pain, fever, chills or sweats. Past Medical History:   Diagnosis Date    Arrhythmia     Atrial Fibrillation    CAD (coronary artery disease)     Diabetes ([facility] [address]     Hypertension      Past Surgical History:   Procedure Laterality Date    BREAST SURGERY PROCEDURE UNLISTED      left breast bx- benign    CARDIAC SURG PROCEDURE UNLIST   or     Coronary Stent    HX GASTRIC BYPASS      HX HYSTERECTOMY      HX LAP CHOLECYSTECTOMY      HX MASTECTOMY Right 2018    RIGHT BREAST BIOPSY WITH NEEDLE LOCALIZATION (1030) performed by Stefanie Gee MD at [address] [place] [address] HX MASTECTOMY Right 2018     Incision and drainage of right breast infected hematoma with abscess. Family History   Problem Relation Age of Onset    Cancer Mother 72        lung     Social History     Tobacco Use    Smoking status: Former Smoker     Last attempt to quit: 1992     Years since quittin.8    Smokeless tobacco: Never Used   Substance Use Topics    Alcohol use: Yes     Comment: oc    Drug use: No     Current Outpatient Medications   Medication Sig    nitroglycerin (NITROSTAT) 0.4 mg SL tablet     hydrocortisone butyrate (LOCOID) 0.1 % topical cream Apply  to affected area two (2) times a day.  miconazole (MONISTAT 7) 2 % vaginal cream Insert 1 Applicator into vagina nightly.     metFORMIN (GLUCOPHAGE) 500 mg tablet Take 500 mg by mouth two (2) times daily (with meals).  lisinopril-hydroCHLOROthiazide (PRINZIDE, ZESTORETIC) 20-12.5 mg per tablet Take 1 Tab by mouth daily.  simvastatin (ZOCOR) 40 mg tablet Take 40 mg by mouth nightly.  metoprolol tartrate (LOPRESSOR) 50 mg tablet Take 50 mg by mouth daily.  Liraglutide (VICTOZA 3-LUIS) 0.6 mg/0.1 mL (18 mg/3 mL) pnij 0.6 mg by SubCUTAneous route. No current facility-administered medications for this visit. No Known Allergies    Review of Systems   Constitutional: Negative for chills, diaphoresis, fever and weight loss. HENT: Negative for sore throat. Eyes: Negative for blurred vision and discharge. Respiratory: Negative for cough, shortness of breath and wheezing. Cardiovascular: Negative for chest pain, palpitations, orthopnea, claudication and leg swelling. Gastrointestinal: Negative for abdominal pain, constipation, diarrhea, heartburn, melena, nausea and vomiting. Genitourinary: Negative for dysuria, flank pain, frequency and hematuria. Musculoskeletal: Negative for back pain, joint pain, myalgias and neck pain. Skin: Negative for rash. Neurological: Negative for dizziness, speech change, focal weakness, seizures, loss of consciousness, weakness and headaches. Endo/Heme/Allergies: Does not bruise/bleed easily. Psychiatric/Behavioral: Negative for depression and memory loss. Visit Vitals  /80 (BP 1 Location: Left arm, BP Patient Position: Sitting)   Pulse 63   Temp 97.4 °F (36.3 °C)   Resp 20   Ht 5' 6\" (1.676 m)   Wt 98 kg (216 lb)   SpO2 97%   BMI 34.86 kg/m²       Physical Exam   Constitutional: She is oriented to person, place, and time. She appears well-developed and well-nourished. No distress. HENT:   Head: Normocephalic and atraumatic. Mouth/Throat: Oropharynx is clear and moist. No oropharyngeal exudate. Eyes: Pupils are equal, round, and reactive to light.  Conjunctivae and EOM are normal. No scleral icterus. Neck: Normal range of motion. Neck supple. No JVD present. No tracheal deviation present. No thyromegaly present. Cardiovascular: Normal rate and regular rhythm. Exam reveals no gallop and no friction rub. No murmur heard. Pulmonary/Chest: Effort normal and breath sounds normal. No respiratory distress. She has no wheezes. She has no rales. Right breast exhibits skin change (wound closing  0.8 x 0.6 x 0.6cm with some proud flesh  ). Right breast exhibits no inverted nipple, no mass, no nipple discharge and no tenderness. Left breast exhibits no inverted nipple, no mass, no nipple discharge, no skin change and no tenderness. Breasts are symmetrical.       Abdominal: Soft. Bowel sounds are normal. She exhibits no distension and no mass. There is no tenderness. There is no rebound and no guarding. Musculoskeletal: Normal range of motion. She exhibits no edema. Lymphadenopathy:     She has no cervical adenopathy. She has no axillary adenopathy. Right: No supraclavicular adenopathy present. Left: No supraclavicular adenopathy present. Neurological: She is alert and oriented to person, place, and time. No cranial nerve deficit. Skin: Skin is warm and dry. No rash noted. She is not diaphoretic. No erythema. No pallor. Psychiatric: She has a normal mood and affect. Her behavior is normal. Judgment and thought content normal.       ASSESSMENT and PLAN  1. Chronic right breast wound. Slowly healing  AgNO3 to proud flesh today  I think it is making more progress again  2. Social.  Now lives in [place]  3. NIDDM type 2. On oral agents  4. Essential hypertension. Stable on rx  5. Severe obesity   Body mass index is 34.86 kg/m².   Recommend diet modification and exercise  Change wound care daily with dry gauze  RTC 4 weeks    Elpidio Gonzalez MD FACS

## 2019-09-30 ENCOUNTER — OFFICE VISIT (OUTPATIENT)
Dept: SURGERY | Age: 74
End: 2019-09-30

## 2019-09-30 VITALS
HEIGHT: 66 IN | WEIGHT: 216.1 LBS | TEMPERATURE: 97.1 F | SYSTOLIC BLOOD PRESSURE: 112 MMHG | DIASTOLIC BLOOD PRESSURE: 72 MMHG | BODY MASS INDEX: 34.73 KG/M2 | HEART RATE: 63 BPM | OXYGEN SATURATION: 97 %

## 2019-09-30 DIAGNOSIS — S21.001D BREAST WOUND, RIGHT, SUBSEQUENT ENCOUNTER: Primary | ICD-10-CM

## 2019-09-30 RX ORDER — APIXABAN 5 MG/1
TABLET, FILM COATED ORAL
Refills: 4 | COMMUNITY
Start: 2019-09-24

## 2019-09-30 NOTE — PROGRESS NOTES
Chief Complaint   Patient presents with    Breast Problem     1 MO. F/U BREAST WOUND     1. Have you been to the ER, urgent care clinic since your last visit? Hospitalized since your last visit? NO    2. Have you seen or consulted any other health care providers outside of the [address] [place] [address] since your last visit? Include any pap smears or colon screening.  NO

## 2019-09-30 NOTE — PROGRESS NOTES
HISTORY OF PRESENT ILLNESS  Kiya Elliott is a 69 y.o. female who comes in for follow up for a chronic breast wound  Breast Problem   Pertinent negatives include no chest pain, no abdominal pain, no headaches and no shortness of breath. She had undergone a right breast biopsy and developed a large hematoma. This got infected and she had a very large debridement 12/3/2019. She was managed with a wound VAC but still had problems with infection. She was changed to Dakins dressing changes and then back to saline dressing changes and the aquacell Ag. She reports some drainage and odor but no pain, fever, chills or sweats. Past Medical History:   Diagnosis Date    Arrhythmia     Atrial Fibrillation    CAD (coronary artery disease)     Diabetes ([address]     Hypertension      Past Surgical History:   Procedure Laterality Date    BREAST SURGERY PROCEDURE UNLISTED      left breast bx- benign    CARDIAC SURG PROCEDURE UNLIST   or     Coronary Stent    HX GASTRIC BYPASS      HX HYSTERECTOMY      HX LAP CHOLECYSTECTOMY      HX MASTECTOMY Right 2018    RIGHT BREAST BIOPSY WITH NEEDLE LOCALIZATION (1030) performed by Rand Dwyer MD at [address] [place] [address] HX MASTECTOMY Right 2018     Incision and drainage of right breast infected hematoma with abscess. Family History   Problem Relation Age of Onset    Cancer Mother 72        lung     Social History     Tobacco Use    Smoking status: Former Smoker     Last attempt to quit: 1992     Years since quittin.9    Smokeless tobacco: Never Used   Substance Use Topics    Alcohol use: Yes     Comment: oc    Drug use: No     Current Outpatient Medications   Medication Sig    ELIQUIS 5 mg tablet TK 1 T PO BID    nitroglycerin (NITROSTAT) 0.4 mg SL tablet     metFORMIN (GLUCOPHAGE) 500 mg tablet Take 500 mg by mouth two (2) times daily (with meals).     lisinopril-hydroCHLOROthiazide (PRINZIDE, ZESTORETIC) 20-12.5 mg per tablet Take 1 Tab by mouth daily.  simvastatin (ZOCOR) 40 mg tablet Take 40 mg by mouth nightly.  metoprolol tartrate (LOPRESSOR) 50 mg tablet Take 50 mg by mouth daily.  hydrocortisone butyrate (LOCOID) 0.1 % topical cream Apply  to affected area two (2) times a day.  miconazole (MONISTAT 7) 2 % vaginal cream Insert 1 Applicator into vagina nightly.  Liraglutide (VICTOZA 3-LUIS) 0.6 mg/0.1 mL (18 mg/3 mL) pnij 0.6 mg by SubCUTAneous route. No current facility-administered medications for this visit. No Known Allergies    Review of Systems   Constitutional: Negative for chills, diaphoresis, fever and weight loss. HENT: Negative for sore throat. Eyes: Negative for blurred vision and discharge. Respiratory: Negative for cough, shortness of breath and wheezing. Cardiovascular: Negative for chest pain, palpitations, orthopnea, claudication and leg swelling. Gastrointestinal: Negative for abdominal pain, constipation, diarrhea, heartburn, melena, nausea and vomiting. Genitourinary: Negative for dysuria, flank pain, frequency and hematuria. Musculoskeletal: Negative for back pain, joint pain, myalgias and neck pain. Skin: Negative for rash. Neurological: Negative for dizziness, speech change, focal weakness, seizures, loss of consciousness, weakness and headaches. Endo/Heme/Allergies: Does not bruise/bleed easily. Psychiatric/Behavioral: Negative for depression and memory loss. Visit Vitals  /72 (BP 1 Location: Left arm, BP Patient Position: Sitting)   Pulse 63   Temp 97.1 °F (36.2 °C) (Oral)   Ht 5' 6\" (1.676 m)   Wt 98 kg (216 lb 1.6 oz)   SpO2 97%   BMI 34.88 kg/m²       Physical Exam   Constitutional: She is oriented to person, place, and time. She appears well-developed and well-nourished. No distress. HENT:   Head: Normocephalic and atraumatic. Mouth/Throat: Oropharynx is clear and moist. No oropharyngeal exudate.    Eyes: Pupils are equal, round, and reactive to light. Conjunctivae and EOM are normal. No scleral icterus. Neck: Normal range of motion. Neck supple. No JVD present. No tracheal deviation present. No thyromegaly present. Cardiovascular: Normal rate and regular rhythm. Exam reveals no gallop and no friction rub. No murmur heard. Pulmonary/Chest: Effort normal and breath sounds normal. No respiratory distress. She has no wheezes. She has no rales. Right breast exhibits skin change (wound closing  0.8 x 0.6 x 0.6cm with some proud flesh  ). Right breast exhibits no inverted nipple, no mass, no nipple discharge and no tenderness. Left breast exhibits no inverted nipple, no mass, no nipple discharge, no skin change and no tenderness. Breasts are symmetrical.       Abdominal: Soft. Bowel sounds are normal. She exhibits no distension and no mass. There is no tenderness. There is no rebound and no guarding. Musculoskeletal: Normal range of motion. She exhibits no edema. Lymphadenopathy:     She has no cervical adenopathy. She has no axillary adenopathy. Right: No supraclavicular adenopathy present. Left: No supraclavicular adenopathy present. Neurological: She is alert and oriented to person, place, and time. No cranial nerve deficit. Skin: Skin is warm and dry. No rash noted. She is not diaphoretic. No erythema. No pallor. Psychiatric: She has a normal mood and affect. Her behavior is normal. Judgment and thought content normal.       ASSESSMENT and PLAN  1. Chronic right breast wound. Slowly healing  AgNO3 to proud flesh again today  I think it is making more progress again  2. Social.  Now lives in [place]  3. NIDDM type 2. On oral agents  4. Essential hypertension. Stable on rx  5. Severe obesity   Body mass index is 34.88 kg/m².   Recommend diet modification and exercise  Change wound care daily with dry gauze  RTC 2 weeks    Paco Collazo MD FACS

## 2019-10-16 ENCOUNTER — OFFICE VISIT (OUTPATIENT)
Dept: SURGERY | Age: 74
End: 2019-10-16

## 2019-10-16 VITALS
HEART RATE: 63 BPM | BODY MASS INDEX: 35.18 KG/M2 | DIASTOLIC BLOOD PRESSURE: 79 MMHG | TEMPERATURE: 97.4 F | WEIGHT: 218.9 LBS | OXYGEN SATURATION: 96 % | HEIGHT: 66 IN | SYSTOLIC BLOOD PRESSURE: 131 MMHG

## 2019-10-16 DIAGNOSIS — S21.001D BREAST WOUND, RIGHT, SUBSEQUENT ENCOUNTER: Primary | ICD-10-CM

## 2019-10-16 NOTE — PROGRESS NOTES
Chief Complaint   Patient presents with    Breast Problem     2 WEEK F/U CHRONIC BREAST WOUND     1. Have you been to the ER, urgent care clinic since your last visit? Hospitalized since your last visit? YES, 12/4/18    2. Have you seen or consulted any other health care providers outside of the [address] [place] [address] since your last visit? Include any pap smears or colon screening.  NO

## 2019-10-16 NOTE — H&P (VIEW-ONLY)
HISTORY OF PRESENT ILLNESS Nicole Godfrey is a 69 y.o. female who comes in for follow up for a chronic breast wound Breast Problem Pertinent negatives include no chest pain, no abdominal pain, no headaches and no shortness of breath. She had undergone a right breast biopsy and developed a large hematoma. This got infected and she had a very large debridement 12/3/2019. She was managed with a wound VAC but still had problems with infection. She was changed to Dakins dressing changes and then back to saline dressing changes and the aquacell Ag. She reports some drainage and odor but no pain, fever, chills or sweats. Past Medical History:  
Diagnosis Date  Arrhythmia Atrial Fibrillation  CAD (coronary artery disease)  Diabetes ([facility] [address]  Hypertension Past Surgical History:  
Procedure Laterality Date  BREAST SURGERY PROCEDURE UNLISTED    
 left breast bx- benign  CARDIAC SURG PROCEDURE UNLIST   or  Coronary Stent [address] [address]  HX MASTECTOMY Right 2018 RIGHT BREAST BIOPSY WITH NEEDLE LOCALIZATION (1030) performed by Mayra Magaña MD at [address] [place] [address] HX MASTECTOMY Right 2018 Incision and drainage of right breast infected hematoma with abscess.  HX OTHER SURGICAL    
 HX OTHER SURGICAL  [place] ANESTH, HEART SURG < AGE 3 History reviewed. No pertinent family history. Social History Tobacco Use  Smoking status: Former Smoker Last attempt to quit: 1992 Years since quittin.9  Smokeless tobacco: Never Used Substance Use Topics  Alcohol use: Yes Comment: oc  Drug use: No  
 
Current Outpatient Medications Medication Sig  ELIQUIS 5 mg tablet TK 1 T PO BID  nitroglycerin (NITROSTAT) 0.4 mg SL tablet as needed.  hydrocortisone butyrate (LOCOID) 0.1 % topical cream Apply  to affected area two (2) times a day.  metFORMIN (GLUCOPHAGE) 500 mg tablet Take 500 mg by mouth two (2) times daily (with meals).  lisinopril-hydroCHLOROthiazide (PRINZIDE, ZESTORETIC) 20-12.5 mg per tablet Take 1 Tab by mouth daily.  simvastatin (ZOCOR) 40 mg tablet Take 40 mg by mouth nightly.  metoprolol tartrate (LOPRESSOR) 50 mg tablet Take 50 mg by mouth daily.  miconazole (MONISTAT 7) 2 % vaginal cream Insert 1 Applicator into vagina nightly.  Liraglutide (VICTOZA 3-LUIS) 0.6 mg/0.1 mL (18 mg/3 mL) pnij 0.6 mg by SubCUTAneous route. No current facility-administered medications for this visit. No Known Allergies Review of Systems Constitutional: Negative for chills, diaphoresis, fever and weight loss. HENT: Negative for sore throat. Eyes: Negative for blurred vision and discharge. Respiratory: Negative for cough, shortness of breath and wheezing. Cardiovascular: Negative for chest pain, palpitations, orthopnea, claudication and leg swelling. Gastrointestinal: Negative for abdominal pain, constipation, diarrhea, heartburn, melena, nausea and vomiting. Genitourinary: Negative for dysuria, flank pain, frequency and hematuria. Musculoskeletal: Negative for back pain, joint pain, myalgias and neck pain. Skin: Negative for rash. Neurological: Negative for dizziness, speech change, focal weakness, seizures, loss of consciousness, weakness and headaches. Endo/Heme/Allergies: Does not bruise/bleed easily. Psychiatric/Behavioral: Negative for depression and memory loss. Visit Vitals /79 (BP 1 Location: Left arm, BP Patient Position: Sitting) Pulse 63 Temp 97.4 °F (36.3 °C) (Oral) Ht 5' 6\" (1.676 m) Wt 99.3 kg (218 lb 14.4 oz) SpO2 96% BMI 35.33 kg/m² Physical Exam  
Constitutional: She is oriented to person, place, and time. She appears well-developed and well-nourished. No distress. HENT:  
Head: Normocephalic and atraumatic. Mouth/Throat: Oropharynx is clear and moist. No oropharyngeal exudate. Eyes: Pupils are equal, round, and reactive to light. Conjunctivae and EOM are normal. No scleral icterus. Neck: Normal range of motion. Neck supple. No JVD present. No tracheal deviation present. No thyromegaly present. Cardiovascular: Normal rate and regular rhythm. Exam reveals no gallop and no friction rub. No murmur heard. Pulmonary/Chest: Effort normal and breath sounds normal. No respiratory distress. She has no wheezes. She has no rales. Right breast exhibits skin change (wound closing  0.8 x 0.6 x 0.6cm with some proud flesh  ). Right breast exhibits no inverted nipple, no mass, no nipple discharge and no tenderness. Left breast exhibits no inverted nipple, no mass, no nipple discharge, no skin change and no tenderness. Breasts are symmetrical.  
 
 
Abdominal: Soft. Bowel sounds are normal. She exhibits no distension and no mass. There is no tenderness. There is no rebound and no guarding. Musculoskeletal: Normal range of motion. She exhibits no edema. Lymphadenopathy:  
  She has no cervical adenopathy. She has no axillary adenopathy. Right: No supraclavicular adenopathy present. Left: No supraclavicular adenopathy present. Neurological: She is alert and oriented to person, place, and time. No cranial nerve deficit. Skin: Skin is warm and dry. No rash noted. She is not diaphoretic. No erythema. No pallor. Psychiatric: She has a normal mood and affect. Her behavior is normal. Judgment and thought content normal.  
 
 
ASSESSMENT and PLAN 1. Chronic right breast wound. Stagnated. I explained to her about the anatomy and pathophysiology of the process and options for ongoing local wound care vs excisional debridement and advancement flap closure of the wound.   Risks of reexcision include but are not limited to, bleeding, infection, need for wound care, poor cosmesis as well as usual anesthesia risks 2. Social.  Now lives in [address] [facility] 3. NIDDM type 2. On oral agents 4. Essential hypertension. Stable on rx 5. Severe obesity   Body mass index is 35.33 kg/m². Recommend diet modification and exercise She desires excisional debridement of right breast wound and advancement flap closure of the wound under MAC as an outpatient Fred Jonas MD FACS Fred Jonas MD FACS

## 2019-10-16 NOTE — PATIENT INSTRUCTIONS
Surgery Instruction Sheet You have been scheduled for surgery on 10/24/2019 at 3:15pm at [facility]. Please report to the Surgery Center at 1:15pm, this is approximately 2 hours prior to your surgery time and is subject to change. The Surgery Center is located on the [address] [place] [address] Street side of the [facility], just next to the Emergency Room. Reserved parking is available and  parking if lot is full. You will need to have a Pre-op Visit prior to your surgery. Report to the Surgery Center on 10/18/2019 at 9:00am.  Bring a list of medications and your insurance cards with you. You may eat/drink prior to this visit. Call your physician immediately if you notice a change in your health between the time you saw your physician and the day of surgery. If you take a blood thinner, please let us know. Call your ordering Doctor to make sure you can stop taking it prior to your surgery. STOP YOUR ASPIRIN 10 DAYS PRIOR TO SURGERY. DO NOT TAKE  IBUPROFEN, ADVIL, MOTRIN, ALEVE, EXCEDRIN, BC POWDER, GOODIES, FISH OIL OR ANY MEDICATION CONTAINING ASPIRIN 10 DAYS PRIOR TO YOUR SURGERY. MAY TAKE TYLENOL. Eat a light dinner the evening before your surgery. DO NOT EAT OR DRINK ANYTHING AFTER MIDNIGHT THE NIGHT BEFORE YOUR SURGERY. This includes water, chewing gum, lifesavers, etc.  The Pre op nurse will check with you about any medication that you may need to take the morning of surgery. Shower with a new bar of anti-bacterial soap (Dial, Safeguard) or solution given to you by Pre-op, the night before surgery. Do not use lotion, powder, deodorant on the skin after showering.   Wear loose, comfortable clothing the day of surgery and bring a container to store your contacts, eyeglasses, dentures, hearing aid, etc.  Do not bring money, valuables, jewelry, etc. to the hospital.   
 
If you are having outpatient surgery, someone must come with you the morning of surgery and stay with you to drive you home. You can not drive for 24 hours after any anesthesia. Sometimes it is necessary to stay overnight and leave the next morning. This is still considered outpatient for most insurance deductibles. Someone will still need to drive you home. If you have questions or concerns, please feel free to call Dr Angela Jacobs at [phone]. If you need to cancel your surgery, please call as soon as possible.

## 2019-10-16 NOTE — PROGRESS NOTES
HISTORY OF PRESENT ILLNESS  Aditi Mortensen is a 69 y.o. female who comes in for follow up for a chronic breast wound  Breast Problem   Pertinent negatives include no chest pain, no abdominal pain, no headaches and no shortness of breath. She had undergone a right breast biopsy and developed a large hematoma. This got infected and she had a very large debridement 12/3/2019. She was managed with a wound VAC but still had problems with infection. She was changed to Dakins dressing changes and then back to saline dressing changes and the aquacell Ag. She reports some drainage and odor but no pain, fever, chills or sweats. Past Medical History:   Diagnosis Date    Arrhythmia     Atrial Fibrillation    CAD (coronary artery disease)     Diabetes ([facility] [address]     Hypertension      Past Surgical History:   Procedure Laterality Date    BREAST SURGERY PROCEDURE UNLISTED      left breast bx- benign    CARDIAC SURG PROCEDURE UNLIST   or     Coronary Stent    HX GASTRIC BYPASS      HX HYSTERECTOMY      HX LAP CHOLECYSTECTOMY      HX MASTECTOMY Right 2018    RIGHT BREAST BIOPSY WITH NEEDLE LOCALIZATION (1030) performed by Jarett Sky MD at [address] [place] [address] HX MASTECTOMY Right 2018     Incision and drainage of right breast infected hematoma with abscess.  HX OTHER SURGICAL      HX OTHER SURGICAL      [place] ANESTH, HEART SURG < AGE 3       History reviewed. No pertinent family history. Social History     Tobacco Use    Smoking status: Former Smoker     Last attempt to quit: 1992     Years since quittin.9    Smokeless tobacco: Never Used   Substance Use Topics    Alcohol use: Yes     Comment: oc    Drug use: No     Current Outpatient Medications   Medication Sig    ELIQUIS 5 mg tablet TK 1 T PO BID    nitroglycerin (NITROSTAT) 0.4 mg SL tablet as needed.  hydrocortisone butyrate (LOCOID) 0.1 % topical cream Apply  to affected area two (2) times a day.     metFORMIN (GLUCOPHAGE) 500 mg tablet Take 500 mg by mouth two (2) times daily (with meals).  lisinopril-hydroCHLOROthiazide (PRINZIDE, ZESTORETIC) 20-12.5 mg per tablet Take 1 Tab by mouth daily.  simvastatin (ZOCOR) 40 mg tablet Take 40 mg by mouth nightly.  metoprolol tartrate (LOPRESSOR) 50 mg tablet Take 50 mg by mouth daily.  miconazole (MONISTAT 7) 2 % vaginal cream Insert 1 Applicator into vagina nightly.  Liraglutide (VICTOZA 3-LUIS) 0.6 mg/0.1 mL (18 mg/3 mL) pnij 0.6 mg by SubCUTAneous route. No current facility-administered medications for this visit. No Known Allergies    Review of Systems   Constitutional: Negative for chills, diaphoresis, fever and weight loss. HENT: Negative for sore throat. Eyes: Negative for blurred vision and discharge. Respiratory: Negative for cough, shortness of breath and wheezing. Cardiovascular: Negative for chest pain, palpitations, orthopnea, claudication and leg swelling. Gastrointestinal: Negative for abdominal pain, constipation, diarrhea, heartburn, melena, nausea and vomiting. Genitourinary: Negative for dysuria, flank pain, frequency and hematuria. Musculoskeletal: Negative for back pain, joint pain, myalgias and neck pain. Skin: Negative for rash. Neurological: Negative for dizziness, speech change, focal weakness, seizures, loss of consciousness, weakness and headaches. Endo/Heme/Allergies: Does not bruise/bleed easily. Psychiatric/Behavioral: Negative for depression and memory loss. Visit Vitals  /79 (BP 1 Location: Left arm, BP Patient Position: Sitting)   Pulse 63   Temp 97.4 °F (36.3 °C) (Oral)   Ht 5' 6\" (1.676 m)   Wt 99.3 kg (218 lb 14.4 oz)   SpO2 96%   BMI 35.33 kg/m²       Physical Exam   Constitutional: She is oriented to person, place, and time. She appears well-developed and well-nourished. No distress. HENT:   Head: Normocephalic and atraumatic.    Mouth/Throat: Oropharynx is clear and moist. No oropharyngeal exudate. Eyes: Pupils are equal, round, and reactive to light. Conjunctivae and EOM are normal. No scleral icterus. Neck: Normal range of motion. Neck supple. No JVD present. No tracheal deviation present. No thyromegaly present. Cardiovascular: Normal rate and regular rhythm. Exam reveals no gallop and no friction rub. No murmur heard. Pulmonary/Chest: Effort normal and breath sounds normal. No respiratory distress. She has no wheezes. She has no rales. Right breast exhibits skin change (wound closing  0.8 x 0.6 x 0.6cm with some proud flesh  ). Right breast exhibits no inverted nipple, no mass, no nipple discharge and no tenderness. Left breast exhibits no inverted nipple, no mass, no nipple discharge, no skin change and no tenderness. Breasts are symmetrical.       Abdominal: Soft. Bowel sounds are normal. She exhibits no distension and no mass. There is no tenderness. There is no rebound and no guarding. Musculoskeletal: Normal range of motion. She exhibits no edema. Lymphadenopathy:     She has no cervical adenopathy. She has no axillary adenopathy. Right: No supraclavicular adenopathy present. Left: No supraclavicular adenopathy present. Neurological: She is alert and oriented to person, place, and time. No cranial nerve deficit. Skin: Skin is warm and dry. No rash noted. She is not diaphoretic. No erythema. No pallor. Psychiatric: She has a normal mood and affect. Her behavior is normal. Judgment and thought content normal.       ASSESSMENT and PLAN  1. Chronic right breast wound. Stagnated. I explained to her about the anatomy and pathophysiology of the process and options for ongoing local wound care vs excisional debridement and advancement flap closure of the wound. Risks of reexcision include but are not limited to, bleeding, infection, need for wound care, poor cosmesis as well as usual anesthesia risks    2. Social.  Now lives in [place]  3. NIDDM type 2. On oral agents  4. Essential hypertension. Stable on rx  5. Severe obesity   Body mass index is 35.33 kg/m².   Recommend diet modification and exercise      She desires excisional debridement of right breast wound and advancement flap closure of the wound under MAC as an outpatient    Dianne Merino MD FACS      Dianne Merino MD FACS

## 2019-10-18 ENCOUNTER — HOSPITAL ENCOUNTER (OUTPATIENT)
Dept: PREADMISSION TESTING | Age: 74
Discharge: HOME | End: 2019-10-18
Attending: SURGERY
Payer: MEDICARE

## 2019-10-18 VITALS
SYSTOLIC BLOOD PRESSURE: 133 MMHG | TEMPERATURE: 98.2 F | HEART RATE: 54 BPM | RESPIRATION RATE: 18 BRPM | WEIGHT: 217.59 LBS | OXYGEN SATURATION: 100 % | HEIGHT: 65 IN | DIASTOLIC BLOOD PRESSURE: 66 MMHG | BODY MASS INDEX: 36.25 KG/M2

## 2019-10-18 LAB
ANION GAP SERPL CALC-SCNC: 5 MMOL/L (ref 5–15)
ATRIAL RATE: 54 BPM
BUN SERPL-MCNC: 27 MG/DL (ref 6–20)
BUN/CREAT SERPL: 21 (ref 12–20)
CALCIUM SERPL-MCNC: 8.8 MG/DL (ref 8.5–10.1)
CALCULATED P AXIS, ECG09: 27 DEGREES
CALCULATED R AXIS, ECG10: -7 DEGREES
CALCULATED T AXIS, ECG11: 11 DEGREES
CHLORIDE SERPL-SCNC: 111 MMOL/L (ref 97–108)
CO2 SERPL-SCNC: 25 MMOL/L (ref 21–32)
CREAT SERPL-MCNC: 1.27 MG/DL (ref 0.55–1.02)
DIAGNOSIS, 93000: NORMAL
ERYTHROCYTE [DISTWIDTH] IN BLOOD BY AUTOMATED COUNT: 13.9 % (ref 11.5–14.5)
GLUCOSE SERPL-MCNC: 116 MG/DL (ref 65–100)
HCT VFR BLD AUTO: 39.3 % (ref 35–47)
HGB BLD-MCNC: 12.4 G/DL (ref 11.5–16)
MCH RBC QN AUTO: 31 PG (ref 26–34)
MCHC RBC AUTO-ENTMCNC: 31.6 G/DL (ref 30–36.5)
MCV RBC AUTO: 98.3 FL (ref 80–99)
NRBC # BLD: 0 K/UL (ref 0–0.01)
NRBC BLD-RTO: 0 PER 100 WBC
P-R INTERVAL, ECG05: 146 MS
PLATELET # BLD AUTO: 198 K/UL (ref 150–400)
PMV BLD AUTO: 10.3 FL (ref 8.9–12.9)
POTASSIUM SERPL-SCNC: 4.9 MMOL/L (ref 3.5–5.1)
Q-T INTERVAL, ECG07: 418 MS
QRS DURATION, ECG06: 82 MS
QTC CALCULATION (BEZET), ECG08: 396 MS
RBC # BLD AUTO: 4 M/UL (ref 3.8–5.2)
SODIUM SERPL-SCNC: 141 MMOL/L (ref 136–145)
VENTRICULAR RATE, ECG03: 54 BPM
WBC # BLD AUTO: 5.4 K/UL (ref 3.6–11)

## 2019-10-18 PROCEDURE — 36415 COLL VENOUS BLD VENIPUNCTURE: CPT

## 2019-10-18 PROCEDURE — 93005 ELECTROCARDIOGRAM TRACING: CPT

## 2019-10-18 PROCEDURE — 85027 COMPLETE CBC AUTOMATED: CPT

## 2019-10-18 PROCEDURE — 80048 BASIC METABOLIC PNL TOTAL CA: CPT

## 2019-10-18 RX ORDER — ASCORBIC ACID 500 MG
500 TABLET ORAL 2 TIMES DAILY
COMMUNITY

## 2019-10-18 NOTE — PERIOP NOTES
[facility]  Preoperative Instructions        Surgery Date 10/24/19          Time of Arrival 1315 [phone]    1. On the day of your surgery, please report to the Surgical Services Registration Desk and sign in at your designated time. The Surgery Center is located to the right of the Emergency Room. 2. You must have someone with you to drive you home. You should not drive a car for 24 hours following surgery. Please make arrangements for a friend or family member to stay with you for the first 24 hours after your surgery. 3. Do not have anything to eat or drink (including water, gum, mints, coffee, juice) after midnight 10/23/19. ? This may not apply to medications prescribed by your physician. ?(Please note below the special instructions with medications to take the morning of your procedure.)    4. We recommend you do not drink any alcoholic beverages for 24 hours before and after your surgery. 5. Contact your surgeons office for instructions on the following medications: non-steroidal anti-inflammatory drugs (i.e. Advil, Aleve), vitamins, and supplements. (Some surgeons will want you to stop these medications prior to surgery and others may allow you to take them)  **If you are currently taking Plavix, Coumadin, Aspirin and/or other blood-thinning agents, contact your surgeon for instructions. ** Your surgeon will partner with the physician prescribing these medications to determine if it is safe to stop or if you need to continue taking. Please do not stop taking these medications without instructions from your surgeon    6. Wear comfortable clothes. Wear glasses instead of contacts. Do not bring any money or jewelry. Please bring picture ID, insurance card, and any prearranged co-payment or hospital payment. Do not wear make-up, particularly mascara the morning of your surgery. Do not wear nail polish, particularly if you are having foot /hand surgery.   Wear your hair loose or down, no ponytails, buns, dusty pins or clips. All body piercings must be removed. Please shower with antibacterial soap for three consecutive days before and on the morning of surgery, but do not apply any lotions, powders or deodorants after the shower on the day of surgery. Please use a fresh towels after each shower. Please sleep in clean clothes and change bed linens the night before surgery. Please do not shave for 48 hours prior to surgery. Shaving of the face is acceptable. 7. You should understand that if you do not follow these instructions your surgery may be cancelled. If your physical condition changes (I.e. fever, cold or flu) please contact your surgeon as soon as possible. 8. It is important that you be on time. If a situation occurs where you may be late, please call [phone] (OR Holding Area). 9. If you have any questions and or problems, please call [phone] (Pre-admission Testing). 10. Your surgery time may be subject to change. You will receive a phone call the evening prior if your time changes. 11.  If having outpatient surgery, you must have someone to drive you here, stay with you during the duration of your stay, and to drive you home at time of discharge. 12.   In an effort to improve the efficiency, privacy, and safety for all of our Pre-op patients visitors are not allowed in the Holding area. Once you arrive and are registered your family/visitors will be asked to remain in the waiting room. The Pre-op staff will get you from the Surgical Waiting Area and will explain to you and your family/visitors that the Pre-op phase is beginning. The staff will answer any questions and provide instructions for tracking of the patient, by use of the existing tracking number and color-coded status board in the waiting room.   At this time the staff will also ask for your designated spokesperson information in the event that the physician or staff need to provide an update or obtain any pertinent information. The designated spokesperson will be notified if the physician needs to speak to family during the pre-operative phase. If at any time your family/visitors has questions or concerns they may approach the volunteer desk in the waiting area for assistance. Special Instructions: none    MEDICATIONS TO TAKE THE MORNING OF SURGERY WITH A SIP OF WATER: none       I understand a pre-operative phone call will be made to verify my surgery time. In the event that I am not available, I give permission for a message to be left on my answering service and/or with another person?   yes         ___________________      __________   _________    (Signature of Patient)             (Witness)                (Date and Time)

## 2019-10-24 ENCOUNTER — ANESTHESIA EVENT (OUTPATIENT)
Dept: SURGERY | Age: 74
End: 2019-10-24
Payer: MEDICARE

## 2019-10-24 ENCOUNTER — HOSPITAL ENCOUNTER (OUTPATIENT)
Age: 74
Setting detail: OUTPATIENT SURGERY
Discharge: HOME | End: 2019-10-24
Attending: SURGERY | Admitting: SURGERY
Payer: MEDICARE

## 2019-10-24 ENCOUNTER — ANESTHESIA (OUTPATIENT)
Dept: SURGERY | Age: 74
End: 2019-10-24
Payer: MEDICARE

## 2019-10-24 VITALS
RESPIRATION RATE: 14 BRPM | SYSTOLIC BLOOD PRESSURE: 134 MMHG | WEIGHT: 216.71 LBS | HEIGHT: 65 IN | TEMPERATURE: 97 F | HEART RATE: 61 BPM | BODY MASS INDEX: 36.11 KG/M2 | DIASTOLIC BLOOD PRESSURE: 76 MMHG | OXYGEN SATURATION: 99 %

## 2019-10-24 DIAGNOSIS — S21.001D BREAST WOUND, RIGHT, SUBSEQUENT ENCOUNTER: Primary | ICD-10-CM

## 2019-10-24 LAB
GLUCOSE BLD STRIP.AUTO-MCNC: 95 MG/DL (ref 65–100)
GLUCOSE BLD STRIP.AUTO-MCNC: 96 MG/DL (ref 65–100)
SERVICE CMNT-IMP: NORMAL
SERVICE CMNT-IMP: NORMAL

## 2019-10-24 PROCEDURE — 77030040361 HC SLV COMPR DVT MDII -B: Performed by: SURGERY

## 2019-10-24 PROCEDURE — 74011250636 HC RX REV CODE- 250/636: Performed by: SURGERY

## 2019-10-24 PROCEDURE — 77030039266 HC ADH SKN EXOFIN S2SG -A: Performed by: SURGERY

## 2019-10-24 PROCEDURE — 74011250636 HC RX REV CODE- 250/636: Performed by: ANESTHESIOLOGY

## 2019-10-24 PROCEDURE — 76210000020 HC REC RM PH II FIRST 0.5 HR: Performed by: SURGERY

## 2019-10-24 PROCEDURE — 77030005513 HC CATH URETH FOL11 MDII -B: Performed by: SURGERY

## 2019-10-24 PROCEDURE — 74011250636 HC RX REV CODE- 250/636: Performed by: NURSE ANESTHETIST, CERTIFIED REGISTERED

## 2019-10-24 PROCEDURE — 76010000138 HC OR TIME 0.5 TO 1 HR: Performed by: SURGERY

## 2019-10-24 PROCEDURE — 77030011640 HC PAD GRND REM COVD -A: Performed by: SURGERY

## 2019-10-24 PROCEDURE — 77030002996 HC SUT SLK J&J -A: Performed by: SURGERY

## 2019-10-24 PROCEDURE — 77030018846 HC SOL IRR STRL H20 ICUM -A: Performed by: SURGERY

## 2019-10-24 PROCEDURE — 74011000250 HC RX REV CODE- 250: Performed by: SURGERY

## 2019-10-24 PROCEDURE — 74011000250 HC RX REV CODE- 250: Performed by: NURSE ANESTHETIST, CERTIFIED REGISTERED

## 2019-10-24 PROCEDURE — 76060000032 HC ANESTHESIA 0.5 TO 1 HR: Performed by: SURGERY

## 2019-10-24 PROCEDURE — 82962 GLUCOSE BLOOD TEST: CPT

## 2019-10-24 PROCEDURE — 77030031139 HC SUT VCRL2 J&J -A: Performed by: SURGERY

## 2019-10-24 PROCEDURE — 88305 TISSUE EXAM BY PATHOLOGIST: CPT

## 2019-10-24 PROCEDURE — 76210000006 HC OR PH I REC 0.5 TO 1 HR: Performed by: SURGERY

## 2019-10-24 PROCEDURE — 77030018836 HC SOL IRR NACL ICUM -A: Performed by: SURGERY

## 2019-10-24 RX ORDER — SODIUM CHLORIDE 0.9 % (FLUSH) 0.9 %
5-40 SYRINGE (ML) INJECTION AS NEEDED
Status: DISCONTINUED | OUTPATIENT
Start: 2019-10-24 | End: 2019-10-24 | Stop reason: HOSPADM

## 2019-10-24 RX ORDER — DIPHENHYDRAMINE HYDROCHLORIDE 50 MG/ML
12.5 INJECTION, SOLUTION INTRAMUSCULAR; INTRAVENOUS AS NEEDED
Status: DISCONTINUED | OUTPATIENT
Start: 2019-10-24 | End: 2019-10-24 | Stop reason: HOSPADM

## 2019-10-24 RX ORDER — SODIUM CHLORIDE, SODIUM LACTATE, POTASSIUM CHLORIDE, CALCIUM CHLORIDE 600; 310; 30; 20 MG/100ML; MG/100ML; MG/100ML; MG/100ML
25 INJECTION, SOLUTION INTRAVENOUS CONTINUOUS
Status: DISCONTINUED | OUTPATIENT
Start: 2019-10-24 | End: 2019-10-24 | Stop reason: HOSPADM

## 2019-10-24 RX ORDER — HYDROMORPHONE HYDROCHLORIDE 1 MG/ML
0.2 INJECTION, SOLUTION INTRAMUSCULAR; INTRAVENOUS; SUBCUTANEOUS
Status: DISCONTINUED | OUTPATIENT
Start: 2019-10-24 | End: 2019-10-24 | Stop reason: HOSPADM

## 2019-10-24 RX ORDER — LEVOFLOXACIN 5 MG/ML
500 INJECTION, SOLUTION INTRAVENOUS ONCE
Status: COMPLETED | OUTPATIENT
Start: 2019-10-24 | End: 2019-10-24

## 2019-10-24 RX ORDER — SODIUM CHLORIDE 0.9 % (FLUSH) 0.9 %
5-40 SYRINGE (ML) INJECTION EVERY 8 HOURS
Status: DISCONTINUED | OUTPATIENT
Start: 2019-10-24 | End: 2019-10-24 | Stop reason: HOSPADM

## 2019-10-24 RX ORDER — HYDROCODONE BITARTRATE AND ACETAMINOPHEN 5; 325 MG/1; MG/1
1 TABLET ORAL
Qty: 15 TAB | Refills: 0 | Status: SHIPPED | OUTPATIENT
Start: 2019-10-24 | End: 2019-10-29

## 2019-10-24 RX ORDER — FENTANYL CITRATE 50 UG/ML
INJECTION, SOLUTION INTRAMUSCULAR; INTRAVENOUS AS NEEDED
Status: DISCONTINUED | OUTPATIENT
Start: 2019-10-24 | End: 2019-10-24 | Stop reason: HOSPADM

## 2019-10-24 RX ORDER — PROPOFOL 10 MG/ML
INJECTION, EMULSION INTRAVENOUS AS NEEDED
Status: DISCONTINUED | OUTPATIENT
Start: 2019-10-24 | End: 2019-10-24 | Stop reason: HOSPADM

## 2019-10-24 RX ORDER — LIDOCAINE HYDROCHLORIDE 10 MG/ML
0.1 INJECTION, SOLUTION EPIDURAL; INFILTRATION; INTRACAUDAL; PERINEURAL AS NEEDED
Status: DISCONTINUED | OUTPATIENT
Start: 2019-10-24 | End: 2019-10-24 | Stop reason: HOSPADM

## 2019-10-24 RX ORDER — LIDOCAINE HYDROCHLORIDE AND EPINEPHRINE 10; 10 MG/ML; UG/ML
INJECTION, SOLUTION INFILTRATION; PERINEURAL AS NEEDED
Status: DISCONTINUED | OUTPATIENT
Start: 2019-10-24 | End: 2019-10-24 | Stop reason: HOSPADM

## 2019-10-24 RX ORDER — PROPOFOL 10 MG/ML
INJECTION, EMULSION INTRAVENOUS
Status: DISCONTINUED | OUTPATIENT
Start: 2019-10-24 | End: 2019-10-24 | Stop reason: HOSPADM

## 2019-10-24 RX ORDER — PHENYLEPHRINE HCL IN 0.9% NACL 0.4MG/10ML
SYRINGE (ML) INTRAVENOUS AS NEEDED
Status: DISCONTINUED | OUTPATIENT
Start: 2019-10-24 | End: 2019-10-24 | Stop reason: HOSPADM

## 2019-10-24 RX ORDER — DEXMEDETOMIDINE HYDROCHLORIDE 100 UG/ML
INJECTION, SOLUTION INTRAVENOUS AS NEEDED
Status: DISCONTINUED | OUTPATIENT
Start: 2019-10-24 | End: 2019-10-24 | Stop reason: HOSPADM

## 2019-10-24 RX ORDER — FENTANYL CITRATE 50 UG/ML
25 INJECTION, SOLUTION INTRAMUSCULAR; INTRAVENOUS
Status: DISCONTINUED | OUTPATIENT
Start: 2019-10-24 | End: 2019-10-24 | Stop reason: HOSPADM

## 2019-10-24 RX ADMIN — FENTANYL CITRATE 25 MCG: 50 INJECTION, SOLUTION INTRAMUSCULAR; INTRAVENOUS at 16:05

## 2019-10-24 RX ADMIN — SODIUM CHLORIDE, SODIUM LACTATE, POTASSIUM CHLORIDE, AND CALCIUM CHLORIDE 25 ML/HR: 600; 310; 30; 20 INJECTION, SOLUTION INTRAVENOUS at 13:53

## 2019-10-24 RX ADMIN — PROPOFOL 30 MG: 10 INJECTION, EMULSION INTRAVENOUS at 16:04

## 2019-10-24 RX ADMIN — FENTANYL CITRATE 25 MCG: 50 INJECTION, SOLUTION INTRAMUSCULAR; INTRAVENOUS at 16:14

## 2019-10-24 RX ADMIN — PROPOFOL 30 MG: 10 INJECTION, EMULSION INTRAVENOUS at 16:05

## 2019-10-24 RX ADMIN — PROPOFOL 50 MCG/KG/MIN: 10 INJECTION, EMULSION INTRAVENOUS at 16:02

## 2019-10-24 RX ADMIN — Medication 120 MCG: at 16:27

## 2019-10-24 RX ADMIN — PROPOFOL 30 MG: 10 INJECTION, EMULSION INTRAVENOUS at 16:02

## 2019-10-24 RX ADMIN — Medication 160 MCG: at 16:18

## 2019-10-24 RX ADMIN — LEVOFLOXACIN 500 MG: 5 INJECTION, SOLUTION INTRAVENOUS at 16:04

## 2019-10-24 RX ADMIN — FENTANYL CITRATE 25 MCG: 50 INJECTION, SOLUTION INTRAMUSCULAR; INTRAVENOUS at 16:02

## 2019-10-24 RX ADMIN — Medication 120 MCG: at 16:24

## 2019-10-24 RX ADMIN — DEXMEDETOMIDINE HYDROCHLORIDE 2 MCG: 100 INJECTION, SOLUTION, CONCENTRATE INTRAVENOUS at 16:11

## 2019-10-24 RX ADMIN — DEXMEDETOMIDINE HYDROCHLORIDE 2 MCG: 100 INJECTION, SOLUTION, CONCENTRATE INTRAVENOUS at 16:02

## 2019-10-24 RX ADMIN — FENTANYL CITRATE 25 MCG: 50 INJECTION, SOLUTION INTRAMUSCULAR; INTRAVENOUS at 16:10

## 2019-10-24 RX ADMIN — DEXMEDETOMIDINE HYDROCHLORIDE 2 MCG: 100 INJECTION, SOLUTION, CONCENTRATE INTRAVENOUS at 16:05

## 2019-10-24 NOTE — PERIOP NOTES
1640-Handoff Report from Operating Room to PACU    Report received from [address] and Jarrell Montanez CRNA regarding Cris Corley. Surgeon(s):  Queen Javier MD  And Procedure(s) (LRB):  EXCISIONAL DEBRIDEMENT OF RIGHT BREAST WOUND WITH ADVANCEMENT FLAP CLOSURE OF WOUND (Right)  confirmed   with allergies and dressings discussed. Anesthesia type, drugs, patient history, complications, estimated blood loss, vital signs, intake and output, and last pain medication, lines, reversal medications  were reviewed. [address] from Nurse    PATIENT INSTRUCTIONS:    After general anesthesia or intravenous sedation, for 24 hours or while taking prescription Narcotics:  · Limit your activities  · Do not drive and operate hazardous machinery  · Do not make important personal or business decisions  · Do  not drink alcoholic beverages  · If you have not urinated within 8 hours after discharge, please contact your surgeon on call. Report the following to your surgeon:  · Excessive pain, swelling, redness or odor of or around the surgical area  · Temperature over 100.5  · Nausea and vomiting lasting longer than 4 hours or if unable to take medications  · Any signs of decreased circulation or nerve impairment to extremity: change in color, persistent  numbness, tingling, coldness or increase pain  · Any questions      These are general instructions for a healthy lifestyle:    No smoking/ No tobacco products/ Avoid exposure to second hand smoke  Surgeon General's Warning:  Quitting smoking now greatly reduces serious risk to your health.     Obesity, smoking, and sedentary lifestyle greatly increases your risk for illness    A healthy diet, regular physical exercise & weight monitoring are important for maintaining a healthy lifestyle    You may be retaining fluid if you have a history of heart failure or if you experience any of the following symptoms:  Weight gain of 3 pounds or more overnight or 5 pounds in a week, increased swelling in our hands or feet or shortness of breath while lying flat in bed. Please call your doctor as soon as you notice any of these symptoms; do not wait until your next office visit. The discharge information has been reviewed with the patient and spouse. The patient and spouse verbalized understanding. Discharge medications reviewed with the patient and spouse and appropriate educational materials and side effects teaching were provided.   ___________________________________________________________________________________________________________________________________

## 2019-10-24 NOTE — ANESTHESIA POSTPROCEDURE EVALUATION
Procedure(s):  EXCISIONAL DEBRIDEMENT OF RIGHT BREAST WOUND WITH ADVANCEMENT FLAP CLOSURE OF WOUND.    general    Anesthesia Post Evaluation        Patient location during evaluation: PACU  Note status: Adequate. Level of consciousness: responsive to verbal stimuli and sleepy but conscious  Pain management: satisfactory to patient  Airway patency: patent  Anesthetic complications: no  Cardiovascular status: acceptable  Respiratory status: acceptable  Hydration status: acceptable  Comments: +Post-Anesthesia Evaluation and Assessment    Patient: Nicole Godfrey MRN: [id number]  SSN: [id number]   YOB: 1945  Age: 69 y.o. Sex: female      Cardiovascular Function/Vital Signs    /67   Pulse 63   Temp 36.3 °C (97.3 °F)   Resp 16   Ht 5' 5\" (1.651 m)   Wt 98.3 kg (216 lb 11.4 oz)   SpO2 95%   BMI 36.06 kg/m²     Patient is status post Procedure(s):  EXCISIONAL DEBRIDEMENT OF RIGHT BREAST WOUND WITH ADVANCEMENT FLAP CLOSURE OF WOUND. Nausea/Vomiting: Controlled. Postoperative hydration reviewed and adequate. Pain:  Pain Scale 1: Numeric (0 - 10) (10/24/19 1342)  Pain Intensity 1: 0 (10/24/19 1342)   Managed. Neurological Status:   Neuro (WDL): Within Defined Limits (10/24/19 1320)   At baseline. Mental Status and Level of Consciousness: Arousable. Pulmonary Status:   O2 Device: Nasal cannula (10/24/19 1642)   Adequate oxygenation and airway patent. Complications related to anesthesia: None    Post-anesthesia assessment completed. No concerns. Signed By: Pb Saldana MD    10/24/2019  Post anesthesia nausea and vomiting:  controlled      Vitals Value Taken Time   /67 10/24/2019  5:15 PM   Temp 36.3 °C (97.3 °F) 10/24/2019  4:42 PM   Pulse 63 10/24/2019  5:29 PM   Resp 13 10/24/2019  5:29 PM   SpO2 99 % 10/24/2019  5:29 PM   Vitals shown include unvalidated device data.

## 2019-10-24 NOTE — DISCHARGE INSTRUCTIONS
Discharge Instructions: Wide excision of chronic wound with advance ment flap closure     Dr. Lilian Oneil    Call for appointment for follow up in 10 days [phone]    Activity:    Walk regularly. You may resume driving in 24 hours unless still requiring narcotics for pain. It is ok to use the arm on side of surgery but do not over do it. Work:    You may return to work in 1 to 2 days. Diet:    You may resume normal diet after 24 hours. Anesthesia and narcotics may cause nausea and vomiting. If persistent please call the office. Wound Care: You have a special dressing called Dermabond. It is okay to shower and let the water run over the incision but do not scrub the area or soak in a tub. If you have a small amount of drainage you may place a dry bandage over the wound and change it daily. If you experience a lot of drainage, develop redness around the wound, or a fever over 101 F occurs please call the office. Medications:    Resume home medications as indicated on the Medical Reconciliation form. Aspirin, Coumadin, and Plavix can be restarted on post operative day 2 if you were taking them preoperatively. Pain medications:  Non steroidal antiinflammatories seem to work best for post surgical pain. Try these first as prescribed. A narcotic prescription will also be given for breakthrough pain. Over the counter stool softeners and laxatives may be used if needed. Do not hesitate to call with questions or concerns. Patient Education      Hydrocodone/Acetaminophen (Vicodin, Norco, Lortab) - (By mouth)   Why this medicine is used:   Treats pain.   Contact a nurse or doctor right away if you have:  · Blistering, peeling, red skin rash  · Fast or slow heartbeat, shallow breathing, blue lips, fingernails, or skin  · Anxiety, restlessness, muscle spasms, twitching, seeing or hearing things that are not there  · Dark urine or pale stools, yellow skin or eyes  · Extreme weakness, sweating, seizures, cold or clammy skin  · Lightheadedness, dizziness, fainting, fever, sweating     Common side effects:  · Constipation, nausea, vomiting, loss of appetite, stomach pain  · Tiredness or sleepiness  © 2017 [facility] Information is for End User's use only and may not be sold, redistributed or otherwise used for commercial purposes. DISCHARGE SUMMARY from Nurse    PATIENT INSTRUCTIONS:    After general anesthesia or intravenous sedation, for 24 hours or while taking prescription Narcotics:  · Limit your activities  · Do not drive and operate hazardous machinery  · Do not make important personal or business decisions  · Do  not drink alcoholic beverages  · If you have not urinated within 8 hours after discharge, please contact your surgeon on call. Report the following to your surgeon:  · Excessive pain, swelling, redness or odor of or around the surgical area  · Temperature over 100.5  · Nausea and vomiting lasting longer than 4 hours or if unable to take medications  · Any signs of decreased circulation or nerve impairment to extremity: change in color, persistent  numbness, tingling, coldness or increase pain  · Any questions        These are general instructions for a healthy lifestyle:    No smoking/ No tobacco products/ Avoid exposure to second hand smoke  Surgeon General's Warning:  Quitting smoking now greatly reduces serious risk to your health. Obesity, smoking, and sedentary lifestyle greatly increases your risk for illness    A healthy diet, regular physical exercise & weight monitoring are important for maintaining a healthy lifestyle    You may be retaining fluid if you have a history of heart failure or if you experience any of the following symptoms:  Weight gain of 3 pounds or more overnight or 5 pounds in a week, increased swelling in our hands or feet or shortness of breath while lying flat in bed.   Please call your doctor as soon as you notice any of these symptoms; do not wait until your next office visit. How to Care for Your Wound After Its Treated With  DERMABOND* Topical Skin Adhesive  DERMABOND* Topical Skin Adhesive (2-octyl cyanoacrylate) is a sterile, liquid skin adhesive  that holds wound edges together. The film will usually remain in place for 5 to 10 days, then  naturally fall off your skin. The following will answer some of your questions and provide instructions for proper care for your  wound while it is healing:    CHECK WOUND APPEARANCE   Some swelling, redness, and pain are common with all wounds and normally will go away as the  wound heals. If swelling, redness, or pain increases or if the wound feels warm to the touch,  contact a doctor. Also contact a doctor if the wound edges reopen or separate. REPLACE BANDAGES   If your wound is bandaged, keep the bandage dry.  Replace the dressing daily until the adhesive film has fallen off or if the  bandage should become wet, unless otherwise instructed by your  physician.  When changing the dressing, do not place tape directly over the  DERMABOND adhesive film, because removing the tape later may also  remove the film. AVOID TOPICAL MEDICATIONS   Do not apply liquid or ointment medications or any other product to your wound while the  DERMABOND adhesive film is in place. These may loosen the film before your wound is healed. KEEP WOUND DRY AND PROTECTED   You may occasionally and briefly wet your wound in the shower or bath. Do not soak or scrub  your wound, do not swim, and avoid periods of heavy perspiration until the DERMABOND  adhesive has naturally fallen off. After showering or bathing, gently blot your wound dry with a  soft towel. If a protective dressing is being used, apply a fresh, dry bandage, being sure to keep  the tape off the DERMABOND adhesive film.  Apply a clean, dry bandage over the wound if necessary to protect it.    Protect your wound from injury until the skin has had sufficient time to heal.   Do not scratch, rub, or pick at the DERMABOND adhesive film. This may loosen the film before  your wound is healed.  Protect the wound from prolonged exposure to sunlight or tanning lamps while the film is in  place. If you have any questions or concerns about this product, please consult your doctor.   *Trademark ©[employer] 2002

## 2019-10-24 NOTE — ANESTHESIA PREPROCEDURE EVALUATION
Anesthetic History   No history of anesthetic complications            Review of Systems / Medical History  Patient summary reviewed and pertinent labs reviewed    Pulmonary  Within defined limits              Comments: Former smoker - Quit 1992   Neuro/Psych   Within defined limits           Cardiovascular    Hypertension: well controlled        Dysrhythmias : atrial fibrillation  CAD and cardiac stents    Exercise tolerance: >4 METS     GI/Hepatic/Renal  Within defined limits             Comments: S/P GBP (1995) Endo/Other    Diabetes: well controlled, type 2    Morbid obesity  Pertinent negatives: No cancer   Other Findings   Comments: Right breast chronic wound           Physical Exam    Airway  Mallampati: III  TM Distance: 4 - 6 cm  Neck ROM: decreased range of motion   Mouth opening: Normal     Cardiovascular    Rhythm: regular  Rate: normal         Dental  No notable dental hx    Comments: Several missing teeth, none loose.    Pulmonary  Breath sounds clear to auscultation               Abdominal  GI exam deferred       Other Findings            Anesthetic Plan    ASA: 3  Anesthesia type: general    Monitoring Plan: BIS      Induction: Intravenous  Anesthetic plan and risks discussed with: Patient

## 2019-10-24 NOTE — BRIEF OP NOTE
BRIEF OPERATIVE NOTE    Date of Procedure: 10/24/2019   Preoperative Diagnosis: CHRONIC RIGHT BREAST WOUND  Postoperative Diagnosis: CHRONIC RIGHT BREAST WOUND    Procedure(s):  EXCISIONAL DEBRIDEMENT OF RIGHT BREAST WOUND WITH ADVANCEMENT FLAP CLOSURE OF WOUND  Surgeon(s) and Role:     * Claritza Bustamante MD - Primary         Surgical Assistant: JU Khan    Surgical Staff:  Circ-1: Elisa Kapadia; Mario Castillo RN  Circ-2: Lady Lynn Acosta RN-1: Ashley Cee RN  Surg Asst-1: Susan White  Event Time In Time Out   Incision Start 1615    Incision Close 1638      Anesthesia: MAC   Estimated Blood Loss: 10 ml  Specimens:   ID Type Source Tests Collected by Time Destination   1 : 1. wound right axillary tail Preservative Axilla  Claritza Bustamante MD 10/24/2019 1619 Pathology      Findings: wound   Complications: none  Implants: * No implants in log *

## 2019-10-25 NOTE — ADDENDUM NOTE
Addendum  created 10/25/19 1313 by Jarrell Nelson MD    Attestation recorded in [facility], [facility] 97 filed

## 2019-10-25 NOTE — OP NOTES
[address]  OPERATIVE REPORT    Name:  J Luis Whelan  MR#:  [id number]  :  1945  ACCOUNT #:  [de-identified]  DATE OF SERVICE:  10/24/2019    PREOPERATIVE DIAGNOSIS:  Right chest wall breast chronic wound. POSTOPERATIVE DIAGNOSIS:  Right chest wall breast chronic wound. PROCEDURE PERFORMED:  Wide excision of right chest wall breast chronic wound and advanced flap closure of the defect. SURGEON:  Mera Mederos MD    ASSISTANT:  None. ANESTHESIA:  MAC.    COMPLICATIONS:  None. SPECIMENS REMOVED:  Right axillary tail wound. IMPLANTS:  None. ESTIMATED BLOOD LOSS:  10 mL    FINDINGS:  Chronic wound. BRIEF HISTORY:  The patient is a 68-year-old female, who had an extremely large breast abscess last year. She has been doing local wound care, but it just continues to not heal.  She now comes in for wide excision of the area and flap closure of the area. She understands the risks and benefits and wishes to proceed. PROCEDURE:  The patient was taken to the operating room, placed on the operating room table in the supine position, underwent IV sedation and a roll was placed underneath the right shoulder and the arms placed over the head with some pillows to alleviate tension. The area was prepped and draped in the usual sterile fashion. After appropriate time-out and antibiotics were given, 1% lidocaine with epinephrine was infiltrated into the skin and subcutaneous tissues over the area and an elliptical incision was made around the chronic wound and the wound was sharply excised all the way down to the pectoralis musculature in the area excising a remnant of sinus tract. Then, irrigation of the wound was performed. Interrupted 3-0 Vicryl was used to reapproximate the deep layers as well as the deep dermis and a running 4-0 Vicryl was used to close the skin after mobilizing the tissue and an Exofin dressing was applied.   Upon completion of the operation, needle, sponge, and instrument counts were correct x2. The patient had tolerated the procedure well and was brought to the recovery room.         Jamie Lopez MD [id number]  D:  10/24/2019 16:45  T:  10/25/2019 3:40  JOB #:  [id number]  CC:  DO Luis Maxwell MD

## 2019-11-11 ENCOUNTER — OFFICE VISIT (OUTPATIENT)
Dept: SURGERY | Age: 74
End: 2019-11-11

## 2019-11-11 VITALS
SYSTOLIC BLOOD PRESSURE: 119 MMHG | TEMPERATURE: 97 F | HEART RATE: 64 BPM | DIASTOLIC BLOOD PRESSURE: 80 MMHG | WEIGHT: 217.5 LBS | HEIGHT: 65 IN | RESPIRATION RATE: 20 BRPM | OXYGEN SATURATION: 98 % | BODY MASS INDEX: 36.24 KG/M2

## 2019-11-11 DIAGNOSIS — Z09 POSTOPERATIVE EXAMINATION: Primary | ICD-10-CM

## 2019-11-11 NOTE — PROGRESS NOTES
Surgery  Follow up  Procedure: excisional debridement and flap closure of wound  OR date:  10/24/2019  Path:       Skin, right axilla, excisional biopsy:   Benign squamous lined tract, granulation tissue and fibrosis     S I feel fine, no drainage    Visit Vitals  /80 (BP 1 Location: Right arm, BP Patient Position: Sitting)   Pulse 64   Temp 97 °F (36.1 °C)   Resp 20   Ht 5' 5\" (1.651 m)   Wt 98.7 kg (217 lb 8 oz)   SpO2 98%   BMI 36.19 kg/m²       O Incisions healing well without infection       A/P Doing well   RTC 4 weeks    Adrianna Beltran MD FACS

## 2019-12-17 ENCOUNTER — OFFICE VISIT (OUTPATIENT)
Dept: SURGERY | Age: 74
End: 2019-12-17

## 2019-12-17 VITALS
DIASTOLIC BLOOD PRESSURE: 76 MMHG | WEIGHT: 218.3 LBS | HEIGHT: 65 IN | BODY MASS INDEX: 36.37 KG/M2 | SYSTOLIC BLOOD PRESSURE: 129 MMHG | RESPIRATION RATE: 16 BRPM | TEMPERATURE: 96.5 F | OXYGEN SATURATION: 99 % | HEART RATE: 59 BPM

## 2019-12-17 DIAGNOSIS — Z09 POSTOPERATIVE EXAMINATION: ICD-10-CM

## 2019-12-17 DIAGNOSIS — Z12.31 BREAST CANCER SCREENING BY MAMMOGRAM: Primary | ICD-10-CM

## 2019-12-17 NOTE — PROGRESS NOTES
Chief Complaint   Patient presents with    Follow-up     6 week Follow-up. 1. Have you been to the ER, urgent care clinic since your last visit? no Hospitalized since your last visit?no    2. Have you seen or consulted any other health care providers outside of the [address] [place] [address] since your last visit?no  Include any pap smears or colon screening.

## 2019-12-17 NOTE — PROGRESS NOTES
Surgery  Follow up  Procedure: excisional debridement and flap closure of wound  OR date:  10/24/2019  Path:       Skin, right axilla, excisional biopsy:   Benign squamous lined tract, granulation tissue and fibrosis     S I feel fine, no drainage    Visit Vitals  /76 (BP 1 Location: Left arm, BP Patient Position: Sitting)   Pulse (!) 59   Temp 96.5 °F (35.8 °C) (Oral)   Resp 16   Ht 5' 5\" (1.651 m)   Wt 99 kg (218 lb 4.8 oz)   SpO2 99%   BMI 36.33 kg/m²       O Incisions healing well without infection       A/P Doing well   Check mammogram in last Feb 2020   RTC prn    Ashley Queen MD FACS

## 2020-02-24 ENCOUNTER — HOSPITAL ENCOUNTER (OUTPATIENT)
Dept: MAMMOGRAPHY | Age: 75
Discharge: HOME | End: 2020-02-24
Attending: SURGERY
Payer: MEDICARE

## 2020-02-24 DIAGNOSIS — Z12.31 BREAST CANCER SCREENING BY MAMMOGRAM: ICD-10-CM

## 2020-02-24 PROCEDURE — 77067 SCR MAMMO BI INCL CAD: CPT

## 2020-05-05 NOTE — INTERVAL H&P NOTE
H&P Update: 
Lolly Sweeney was seen and examined. History and physical has been reviewed. The patient has been examined.  There have been no significant clinical changes since the completion of the originally dated History and Physical. 
 
 
 Kelsey called and left a VM stating that she had been billed the full amount of $890 for her genetic testing.  We had previously discussed that [facility] had reduced the cost of her testing to $190, and Kelsey requested our assistance in clarify this discrepancy.  A call was placed to [facility] Genetics Billing department on Kelsey's behalf.   Per Call Ticket #[id number], the genetics testing lab is able to contribute $700 to Kelsey's testing bringing the total cost down to $190.  A copy of this ticket is scanned into Kelsey's EMR under the media tab.  This was relayed to [facility] who acknowledged that the $890 was billed in error and Kelsey is responsible for the $190.  [facility] will restart the billing process for $190 only.  This information was relayed to Kelsey who had no further questions or concerns at this time.     Walter Vivas MS, [facility]  Licensed Genetic Counselor  Phone: [phone]  Pager: [phone]

## 2021-07-26 NOTE — WOUND CARE
Here for INR check in coumadin clinic in the office.     CURRENT COUMADIN DOSE:  8mg M,F and 7mg all other days of the week     CHANGES OR COMPLAINTS:  No changes    INR RESULTS TODAY:  1.9 ( below goal)    PLAN:  Increase coumadin to 1mg M,W,F and 2mg all Wound care Nurse in to change am dressing to right lateral breast wound. Patient tolerates dressing change really well without pain medicine. Family members present in room were present for dressing change and all questions asked. Patients drainage has gone from dark brown to light brown, still moderate amount. Induration at more lateral side/flank area of wound. Plan: dressing change tomorrow with dr Joss Campbell to assess wound for further debridement vs wound VAC application.  
 
Deacon Gongora RN, [employer]

## 2022-03-18 PROBLEM — N17.9 ACUTE RENAL FAILURE (ARF) (HCC): Status: ACTIVE | Noted: 2018-11-20

## 2022-03-18 PROBLEM — D62 ACUTE BLOOD LOSS ANEMIA: Status: ACTIVE | Noted: 2018-11-18

## 2022-03-19 PROBLEM — N61.1 BREAST ABSCESS: Status: ACTIVE | Noted: 2018-12-02

## 2022-03-19 PROBLEM — I95.9 HYPOTENSION ARTERIAL: Status: ACTIVE | Noted: 2018-11-20

## 2022-03-19 PROBLEM — R92.0 MICROCALCIFICATIONS OF THE BREAST: Status: ACTIVE | Noted: 2018-04-23

## 2022-03-19 PROBLEM — S21.001D BREAST WOUND, RIGHT, SUBSEQUENT ENCOUNTER: Status: ACTIVE | Noted: 2019-03-08

## 2022-03-19 PROBLEM — E87.20 METABOLIC ACIDOSIS: Status: ACTIVE | Noted: 2018-11-20

## 2022-03-20 PROBLEM — E66.01 SEVERE OBESITY (HCC): Status: ACTIVE | Noted: 2018-12-12

## 2022-06-28 ENCOUNTER — TRANSCRIBE ORDER (OUTPATIENT)
Dept: SCHEDULING | Age: 77
End: 2022-06-28

## 2022-06-28 DIAGNOSIS — Z12.31 VISIT FOR SCREENING MAMMOGRAM: Primary | ICD-10-CM

## 2022-07-20 ENCOUNTER — HOSPITAL ENCOUNTER (OUTPATIENT)
Dept: MAMMOGRAPHY | Age: 77
Discharge: HOME | End: 2022-07-20
Attending: FAMILY MEDICINE
Payer: MEDICARE

## 2022-07-20 DIAGNOSIS — Z12.31 VISIT FOR SCREENING MAMMOGRAM: ICD-10-CM

## 2022-07-20 PROCEDURE — 77067 SCR MAMMO BI INCL CAD: CPT

## 2022-12-06 RX ORDER — DIPHENHYDRAMINE HCL 25 MG
25 CAPSULE ORAL
COMMUNITY

## 2022-12-06 NOTE — PERIOP NOTES
[facility]  Ambulatory Surgery Unit  Pre-operative Instructions for Endo Procedures    Procedure Date  Friday, December 9, 2022            Arrival Time TBD      1. On the day of your procedure, please report to the Ambulatory Surgery Unit Registration Desk and sign in at your designated time. The Ambulatory Surgery Unit is located in [facility] on the [facility] side of the [facility] across from the [address] [place] [address]. Please have all of your health insurance cards, copayments, and a photo ID.    **TWO adults may accompany you the day of the procedure. We have limited seating available. If our waiting room is at capacity, your ride may be asked to remain in their vehicle. No one under 15 is allowed in the waiting room. 2. You must have someone with you to drive you home, as you should not drive a car for 24 hours following anesthesia. Please make arrangements for a responsible adult friend or family member to stay with you for at least the first 24 hours after your procedure. 3. Do not have anything to eat or drink (including water, gum, mints, coffee, juice) after 11:59 PM, Thursday. This may not apply to medications prescribed by your physician. (Please note below the special instructions with medications to take the morning of your procedure.)    4. If applicable, follow the clear liquid diet and bowel prep instructions provided by your physician's office. If you do not have this information, or have any questions, please contact your physician's office. 5. We recommend you do not drink any alcoholic beverages for 24 hours before and after your procedure. 6. Contact your surgeons office for instructions on the following medications: non-steroidal anti-inflammatory drugs (i.e. Advil, Aleve), vitamins, and supplements.  (Some surgeons will want you to stop these medications prior to surgery and others may allow you to take them)   **If you are currently taking Plavix, Coumadin, Aspirin and/or other blood-thinning agents, contact your surgeon for instructions. ** Your surgeon will partner with the physician prescribing these medications to determine if it is safe to stop or if you need to continue taking. Please do not stop taking these medications without instructions from your surgeon. 7.  We ask that you shower with an anti-bacterial soap (i.e. Dial or Safeguard) on the morning of your procedure. Do not apply any lotions or powders after showering. 8. Wear comfortable clothes. Wear glasses instead of contacts. Do not bring any jewelry or money (other than copays or fees as instructed). Do not wear make-up, particularly mascara, the morning of your procedure. Wear your hair loose or down, no ponytails, buns, dusty pins or clips. All body piercings must be removed. 9. You should understand that if you do not follow these instructions your procedure may be cancelled. If your physical condition changes (i.e. fever, cold or flu) please contact your surgeon as soon as possible. 10. It is important that you be on time. If a situation occurs where you may be late, or if you have any questions or problems, please call [phone]. 11. Your procedure time may be subject to change. You will receive a phone call the day prior to confirm your arrival time. Special Instructions: Take all medications and inhalers, as prescribed, on the morning of surgery with a sip of water. Insulin Dependent Diabetic patients: Take your diabetic medications as prescribed the day before surgery. Hold all diabetic medications the day of surgery. If you are scheduled to arrive for surgery after 8:00 AM, and your AM blood sugar is >200, please call Ambulatory Surgery. I understand a pre-operative phone call will be made to verify my procedure time.  In the event that I am not available, I give permission for a message to be left on my answering service and/or with another person?       yes    Preop instructions reviewed  Pt verbalized understanding.       ___________________      ___________________      ___________________  (Signature of Patient)          (Witness)                   (Date and Time)

## 2022-12-08 ENCOUNTER — ANESTHESIA EVENT (OUTPATIENT)
Dept: SURGERY | Age: 77
End: 2022-12-08
Payer: MEDICARE

## 2022-12-09 ENCOUNTER — ANESTHESIA (OUTPATIENT)
Dept: SURGERY | Age: 77
End: 2022-12-09
Payer: MEDICARE

## 2022-12-09 ENCOUNTER — HOSPITAL ENCOUNTER (OUTPATIENT)
Age: 77
Setting detail: OUTPATIENT SURGERY
Discharge: HOME | End: 2022-12-09
Attending: SPECIALIST | Admitting: SPECIALIST
Payer: MEDICARE

## 2022-12-09 VITALS
BODY MASS INDEX: 34.23 KG/M2 | SYSTOLIC BLOOD PRESSURE: 127 MMHG | RESPIRATION RATE: 18 BRPM | HEART RATE: 67 BPM | OXYGEN SATURATION: 97 % | TEMPERATURE: 97.5 F | WEIGHT: 213 LBS | HEIGHT: 66 IN | DIASTOLIC BLOOD PRESSURE: 70 MMHG

## 2022-12-09 LAB
GLUCOSE BLD STRIP.AUTO-MCNC: 107 MG/DL (ref 65–117)
GLUCOSE BLD STRIP.AUTO-MCNC: 112 MG/DL (ref 65–117)
SERVICE CMNT-IMP: NORMAL
SERVICE CMNT-IMP: NORMAL

## 2022-12-09 PROCEDURE — 2709999900 HC NON-CHARGEABLE SUPPLY: Performed by: SPECIALIST

## 2022-12-09 PROCEDURE — 76210000046 HC AMBSU PH II REC FIRST 0.5 HR: Performed by: SPECIALIST

## 2022-12-09 PROCEDURE — 74011000250 HC RX REV CODE- 250: Performed by: NURSE ANESTHETIST, CERTIFIED REGISTERED

## 2022-12-09 PROCEDURE — 74011250636 HC RX REV CODE- 250/636: Performed by: NURSE ANESTHETIST, CERTIFIED REGISTERED

## 2022-12-09 PROCEDURE — 76210000040 HC AMBSU PH I REC FIRST 0.5 HR: Performed by: SPECIALIST

## 2022-12-09 PROCEDURE — 82962 GLUCOSE BLOOD TEST: CPT

## 2022-12-09 PROCEDURE — 74011250636 HC RX REV CODE- 250/636: Performed by: ANESTHESIOLOGY

## 2022-12-09 PROCEDURE — 88312 SPECIAL STAINS GROUP 1: CPT

## 2022-12-09 PROCEDURE — 76030000002 HC AMB SURG OR TIME FIRST 0.: Performed by: SPECIALIST

## 2022-12-09 PROCEDURE — 88305 TISSUE EXAM BY PATHOLOGIST: CPT

## 2022-12-09 PROCEDURE — 76060000073 HC AMB SURG ANES FIRST 0.5 HR: Performed by: SPECIALIST

## 2022-12-09 RX ORDER — ONDANSETRON 2 MG/ML
4 INJECTION INTRAMUSCULAR; INTRAVENOUS AS NEEDED
Status: DISCONTINUED | OUTPATIENT
Start: 2022-12-09 | End: 2022-12-09 | Stop reason: HOSPADM

## 2022-12-09 RX ORDER — SODIUM CHLORIDE, SODIUM LACTATE, POTASSIUM CHLORIDE, CALCIUM CHLORIDE 600; 310; 30; 20 MG/100ML; MG/100ML; MG/100ML; MG/100ML
25 INJECTION, SOLUTION INTRAVENOUS CONTINUOUS
Status: DISCONTINUED | OUTPATIENT
Start: 2022-12-09 | End: 2022-12-09 | Stop reason: HOSPADM

## 2022-12-09 RX ORDER — DIPHENHYDRAMINE HYDROCHLORIDE 50 MG/ML
12.5 INJECTION, SOLUTION INTRAMUSCULAR; INTRAVENOUS AS NEEDED
Status: DISCONTINUED | OUTPATIENT
Start: 2022-12-09 | End: 2022-12-09 | Stop reason: HOSPADM

## 2022-12-09 RX ORDER — SODIUM CHLORIDE 9 MG/ML
50 INJECTION, SOLUTION INTRAVENOUS CONTINUOUS
Status: DISCONTINUED | OUTPATIENT
Start: 2022-12-09 | End: 2022-12-09 | Stop reason: HOSPADM

## 2022-12-09 RX ORDER — LIDOCAINE HYDROCHLORIDE 10 MG/ML
0.1 INJECTION, SOLUTION EPIDURAL; INFILTRATION; INTRACAUDAL; PERINEURAL AS NEEDED
Status: DISCONTINUED | OUTPATIENT
Start: 2022-12-09 | End: 2022-12-09 | Stop reason: HOSPADM

## 2022-12-09 RX ORDER — SODIUM CHLORIDE, SODIUM LACTATE, POTASSIUM CHLORIDE, CALCIUM CHLORIDE 600; 310; 30; 20 MG/100ML; MG/100ML; MG/100ML; MG/100ML
INJECTION, SOLUTION INTRAVENOUS
Status: DISCONTINUED | OUTPATIENT
Start: 2022-12-09 | End: 2022-12-09 | Stop reason: HOSPADM

## 2022-12-09 RX ORDER — FENTANYL CITRATE 50 UG/ML
25 INJECTION, SOLUTION INTRAMUSCULAR; INTRAVENOUS
Status: DISCONTINUED | OUTPATIENT
Start: 2022-12-09 | End: 2022-12-09 | Stop reason: HOSPADM

## 2022-12-09 RX ORDER — SODIUM CHLORIDE 0.9 % (FLUSH) 0.9 %
5-40 SYRINGE (ML) INJECTION EVERY 8 HOURS
Status: DISCONTINUED | OUTPATIENT
Start: 2022-12-09 | End: 2022-12-09 | Stop reason: HOSPADM

## 2022-12-09 RX ORDER — LIDOCAINE HYDROCHLORIDE 20 MG/ML
INJECTION, SOLUTION EPIDURAL; INFILTRATION; INTRACAUDAL; PERINEURAL AS NEEDED
Status: DISCONTINUED | OUTPATIENT
Start: 2022-12-09 | End: 2022-12-09 | Stop reason: HOSPADM

## 2022-12-09 RX ORDER — PANTOPRAZOLE SODIUM 20 MG/1
40 TABLET, DELAYED RELEASE ORAL
Qty: 30 TABLET | Refills: 3 | Status: SHIPPED | OUTPATIENT
Start: 2022-12-09

## 2022-12-09 RX ORDER — SODIUM CHLORIDE 0.9 % (FLUSH) 0.9 %
5-40 SYRINGE (ML) INJECTION AS NEEDED
Status: DISCONTINUED | OUTPATIENT
Start: 2022-12-09 | End: 2022-12-09 | Stop reason: HOSPADM

## 2022-12-09 RX ORDER — DEXTROMETHORPHAN/PSEUDOEPHED 2.5-7.5/.8
1.2 DROPS ORAL
Status: DISCONTINUED | OUTPATIENT
Start: 2022-12-09 | End: 2022-12-09 | Stop reason: HOSPADM

## 2022-12-09 RX ORDER — PROPOFOL 10 MG/ML
INJECTION, EMULSION INTRAVENOUS AS NEEDED
Status: DISCONTINUED | OUTPATIENT
Start: 2022-12-09 | End: 2022-12-09 | Stop reason: HOSPADM

## 2022-12-09 RX ADMIN — PROPOFOL 100 MG: 10 INJECTION, EMULSION INTRAVENOUS at 08:45

## 2022-12-09 RX ADMIN — PROPOFOL 100 MG: 10 INJECTION, EMULSION INTRAVENOUS at 08:57

## 2022-12-09 RX ADMIN — LIDOCAINE HYDROCHLORIDE 100 MG: 20 INJECTION, SOLUTION EPIDURAL; INFILTRATION; INTRACAUDAL; PERINEURAL at 08:45

## 2022-12-09 RX ADMIN — SODIUM CHLORIDE, SODIUM LACTATE, POTASSIUM CHLORIDE, AND CALCIUM CHLORIDE: 600; 310; 30; 20 INJECTION, SOLUTION INTRAVENOUS at 08:43

## 2022-12-09 RX ADMIN — PROPOFOL 50 MG: 10 INJECTION, EMULSION INTRAVENOUS at 09:03

## 2022-12-09 RX ADMIN — PROPOFOL 100 MG: 10 INJECTION, EMULSION INTRAVENOUS at 08:50

## 2022-12-09 RX ADMIN — SODIUM CHLORIDE, POTASSIUM CHLORIDE, SODIUM LACTATE AND CALCIUM CHLORIDE 25 ML/HR: 600; 310; 30; 20 INJECTION, SOLUTION INTRAVENOUS at 08:08

## 2022-12-09 NOTE — ANESTHESIA POSTPROCEDURE EVALUATION
Procedure(s):  ESOPHAGOGASTRODUODENOSCOPY (EGD)  ESOPHAGOGASTRODUODENAL (EGD) BIOPSY  COLONOSCOPY  POLYPECTOMY.     MAC, total IV anesthesia    Anesthesia Post Evaluation      Multimodal analgesia: multimodal analgesia not used between 6 hours prior to anesthesia start to PACU discharge  Patient location during evaluation: PACU  Patient participation: complete - patient participated  Level of consciousness: awake and alert  Pain score: 0  Airway patency: patent  Anesthetic complications: no  Cardiovascular status: acceptable  Respiratory status: acceptable  Hydration status: acceptable  Post anesthesia nausea and vomiting:  none  Final Post Anesthesia Temperature Assessment:  Normothermia (36.0-37.5 degrees C)      INITIAL Post-op Vital signs:   Vitals Value Taken Time   /72 12/09/22 0930   Temp 36 °C (96.8 °F) 12/09/22 0911   Pulse 74 12/09/22 0930   Resp 16 12/09/22 0930   SpO2 97 % 12/09/22 0930

## 2022-12-09 NOTE — PERIOP NOTES
Rosario Smith  1945  [id number]    Situation:  Verbal report given from: Reilly Franco and RANDALL Middleton RN  Procedure: Procedure(s):  ESOPHAGOGASTRODUODENOSCOPY (EGD)  ESOPHAGOGASTRODUODENAL (EGD) BIOPSY  COLONOSCOPY  POLYPECTOMY    Background:    Preoperative diagnosis: Bariatric surgery status [Z98.84]  Gastroesophageal reflux disease, unspecified whether esophagitis present [K21.9]  Long term (current) use of anticoagulants [Z79.01]  Obesity, unspecified classification, unspecified obesity type, unspecified whether serious comorbidity present [E66.9]  Encounter for screening colonoscopy [Z12.11]    Postoperative diagnosis: egd - gastric erosion and hiatal henia   colon - polyps internal hemorrhodis    :  Dr. Monet     Assistant(s): Endoscopy Technician-1: Belkys Singh    Specimens:   ID Type Source Tests Collected by Time Destination   1 : [address]   Christina Farris MD 12/9/2022 1748 Pathology   2 : Ascending colon polyps Preservative   Christina Farris MD 12/9/2022 9311 Pathology       Assessment:  Intra-procedure medications   Propofol 450 mg      Anesthesia gave intra-procedure sedation and medications, see anesthesia flow sheet     Intravenous fluids: LR@ KVO     Vital signs stable     Abdominal assessment: round and soft       Recommendation:    Permission to share finding with  daughter Jeremy Troy    All side rails up, bed in low position, wheels locked. Nurse at bedside.

## 2022-12-09 NOTE — ANESTHESIA PREPROCEDURE EVALUATION
Anesthetic History   No history of anesthetic complications            Review of Systems / Medical History  Patient summary reviewed, nursing notes reviewed and pertinent labs reviewed    Pulmonary  Within defined limits              Comments: Former smoker - Quit 1992   Neuro/Psych   Within defined limits           Cardiovascular    Hypertension: well controlled        Dysrhythmias : atrial fibrillation  CAD and cardiac stents    Exercise tolerance: >4 METS  Comments: On chronic AC's   GI/Hepatic/Renal     GERD: well controlled          Comments: S/P GBP (1995) Endo/Other    Diabetes: well controlled, type 2    Obesity     Other Findings              Physical Exam    Airway  Mallampati: III  TM Distance: < 4 cm  Neck ROM: decreased range of motion   Mouth opening: Normal     Cardiovascular    Rhythm: regular  Rate: normal         Dental  No notable dental hx    Comments: Several missing teeth, none loose. Pulmonary  Breath sounds clear to auscultation               Abdominal  GI exam deferred       Other Findings            Anesthetic Plan    ASA: 3  Anesthesia type: MAC and total IV anesthesia          Induction: Intravenous  Anesthetic plan and risks discussed with: Patient      preop glucose 107.  Off Eliquis x 3 days

## 2022-12-09 NOTE — PROCEDURES
Esophagogastroduodenoscopy Procedure Note      Martin Caceres  1945  [id number]    Indication:  GERD     Endoscopist: Jessica Tamayo MD    Referring Provider:  Jaquan Bernal DO    Sedation:  MAC anesthesia Propofol    Procedure Details:  After infomed consent was obtained for the procedure, with all risks and benefits of procedure explained the patient was taken to the endoscopy suite and placed in the left lateral decubitus position. Following sequential administration of sedation as per above, the endoscope was inserted into the mouth and advanced under direct vision to proximal jejunum. A careful inspection was made as the gastroscope was withdrawn, including a retroflexed view of the proximal stomach; findings and interventions are described below. Findings:     Esophagus:   + There was a linear erosion c/w LA Grade A erosive esophagitis s/p Bx.  + 3 cm Hiatal Hernia    Stomach:   + Ample sized gastric pouch with a patent Gastrojejunal anastomosis. Small Bowel:  + Normal efferent limb of Mavis En Y    Therapies:  see above    Specimen: Specimens were collected as described and send to the laboratory. Complications:   None were encountered during the procedure. EBL: < 10 ml.           Recommendations:   -F/U Path  -GERD diet  -PPI      Jessica Tamayo MD  12/9/2022  9:13 AM

## 2022-12-09 NOTE — DISCHARGE INSTRUCTIONS
Cate Ambriz  [id number]  1945    COLON / EGD DISCHARGE INSTRUCTIONS  Discomfort:  Sore throat- throat lozenges or warm salt water gargle  Redness at IV site- apply warm compress to area; if redness or soreness persist- contact your physician  There may be a slight amount of blood passed from the rectum  Gaseous discomfort- walking, belching will help relieve any discomfort  You may not operate a vehicle for 12 hours  You may not engage in an occupation involving machinery or appliances for rest of today  You may not drink alcoholic beverages for at least 12 hours  Avoid making any critical decisions for at least 24 hour  DIET:   Regular diet. - however -  remember your colon is empty and a heavy meal will produce gas. Avoid these foods:  vegetables, fried / greasy foods, carbonated drinks for today     ACTIVITY:  You may  resume your normal daily activities it is recommended that you spend the remainder of the day resting -  avoid any strenuous activity. CALL M.D. ANY SIGN OF:   Increasing pain, nausea, vomiting  Abdominal distension (swelling)  New increased bleeding (oral or rectal)  Fever (chills)  Pain in chest area  Bloody discharge from nose or mouth  Shortness of breath  Tylenol as needed for pain.       Follow-up Instructions:   Call Dr. Laura Esposito for results of procedure / biopsy in 7 days at telephone #  [phone]  Additional instructions: Repeat Colonoscopy in 5 years                                       Resume Eliquis in 2 days                                       Start Pantoprazole 40 mg daily for acid reflux and esophageal inflammation                  Cate Pb  [id number]  1945        DISCHARGE SUMMARY from Nurse    The following personal items collected during your admission are returned to you:   Dental Appliance: Dental Appliances: None  Vision: Visual Aid: Glasses (PACU)  Hearing Aid:    Jewelry:    Clothing:    Other Valuables:    Valuables sent to safe:      PATIENT INSTRUCTIONS:    Take Home Medications:        DO NOT TAKE SLEEPING MEDICATIONS OR ANTIANXIETY MEDICATIONS WHILE TAKING NARCOTIC PAIN MEDICATIONS,  ESPECIALLY THE NIGHT OF ANESTHESIA. CPAP PATIENTS BE SURE TO WEAR MACHINE WHENEVER NAPPING OR SLEEPING. DISCHARGE SUMMARY from Nurse    The following personal items collected during your admission are returned to you:   Dental Appliance: Dental Appliances: None  Vision: Visual Aid: Glasses (PACU)  Hearing Aid:    Jewelry:    Clothing:    Other Valuables:    Valuables sent to safe:        PATIENT INSTRUCTIONS:    Anesthesia Discharge Instructions for Procedural Area requiring Sedation (MAC Anesthesia, Cath Lab, Endo and Radiology): You have been given medications during your procedure that may affect your memory and mental judgement for the next 24 hours. During this time frame for your safety, please follow the instructions listed below :   Have a responsible adult to drive you home and be with you for at least 12 hours. Rest today and resume normal activities tomorrow. Start with a soft bland diet and advance as tolerated to your recommended diet. Do not drive any motor vehicle or operate mechanical or electrical equipment prior to [employer]. Avoid making critical decisions or signing legal documents prior to 6am tomorrow. Do not drink alcohol prior to 6am tomorrow. If you have sleep apnea and you plan to go home and take a nap, please use your CPAP machine not only at bedtime, but also while napping for 24 hours. If you notice any redness or swelling on parts of your body where IV medications were given, place a warm wet washcloth over the area for 20 minutes at a time until the redness or swelling goes away. If you still have redness or swelling after 2-3 days, please call us. You will receive a Post Operative Call from one of the Recovery Room Nurses on the day after your surgery to check on you.  It is very important for us to know how you are recovering after your surgery. If you have an issue or need to speak with someone, please call your surgeon, do not wait for the post operative call. You may receive an e-mail or letter in the mail from [employer] regarding your experience with us in the Ambulatory Surgery Unit. Your feedback is valuable to us and we appreciate your participation in the survey. We wish you a speedy recovery ? What to do at Home:      *  Please give a list of your current medications to your Primary Care Provider. *  Please update this list whenever your medications are discontinued, doses are      changed, or new medications (including over-the-counter products) are added. *  Please carry medication information at all times in case of emergency situations. If you have not received your influenza and/or pneumococcal vaccine, please follow up with your primary care physician.

## 2022-12-09 NOTE — PROCEDURES
Colonoscopy Procedure Note    Indications:   Screening colonoscopy    Referring Physician: Laxmi Rainey DO  Anesthesia/Sedation: MAC anesthesia Propofol  Endoscopist:  Dr. Veto Winslow    Procedure in Detail:  Informed consent was obtained for the procedure, including sedation. Risks of perforation, hemorrhage, adverse drug reaction, and aspiration were discussed. The patient was placed in the left lateral decubitus position. Based on the pre-procedure assessment, including review of the patient's medical history, medications, allergies, and review of systems, she had been deemed to be an appropriate candidate for moderate sedation; she was therefore sedated with the medications listed above. The patient was monitored continuously with ECG tracing, pulse oximetry, blood pressure monitoring, and direct observations. A rectal examination was performed. The [id number] was inserted into the rectum and advanced under direct vision to the cecum, which was identified by the ileocecal valve and appendiceal orifice. The quality of the colonic preparation was adequate. A careful inspection was made as the colonoscope was withdrawn, including a retroflexed view of the rectum; findings and interventions are described below. Appropriate photodocumentation was obtained. Findings:    Scope advanced to the cecum. Preparation was adequate. (4) sessile polyps in the ascending colon between 3-4 mm in size removed completely with cold snare technique and sent in same specimen jar. Internal hemorrhoids. .    Therapies:  see above    Specimen: Specimens were collected as described above and sent to pathology. Complications: None were encountered during the procedure. EBL: < 10 ml.     Recommendations:   - Repeat Colonoscopy in 5 years  - Resume Eliquis in 2 days  Signed By: Emanuel Alvarez MD                        December 9, 2022

## 2022-12-09 NOTE — ROUTINE PROCESS
Scope [id number]  [id number]    Scope [id number]  [id number]

## 2022-12-09 NOTE — PERIOP NOTES
Permission received to review discharge instructions and discuss private health information with Herman Greggon, daughter.

## 2022-12-09 NOTE — H&P
Gastroenterology Outpatient History and Physical    Patient: Floriancarlos enrique Madrigal    Physician: Rogerio Greer MD    Vital Signs: Blood pressure (!) 157/85, pulse 73, temperature 98.2 °F (36.8 °C), resp. rate 15, height 5' 6\" (1.676 m), weight 96.6 kg (213 lb), SpO2 99 %, not currently breastfeeding. Allergies: No Known Allergies    Chief Complaint: Persistent GERD, prior gastric bypass for EGD;  CRC screening    History of Present Illness: above    Justification for Procedure: above    History:  Past Medical History:   Diagnosis Date    BULL (acute kidney injury) ([facility] [address] 2017    Arrhythmia     Atrial Fibrillation    CAD (coronary artery disease)     Diabetes ([facility] [address]     GERD (gastroesophageal reflux disease)     Hypertension     Patient denies medical problems     pt has mastectomy listed in surgeries. she has not had mastectomy, correct procedure in comments and reentered correctly, unable to delete. Sepsis ([facility] [address] 05/2017      Past Surgical History:   Procedure Laterality Date    HX APPENDECTOMY      taken with gallbladder    HX BREAST BIOPSY Right 12/2018    RIGHT BREAST BIOPSY WITH NEEDLE LOCALIZATION (1030) performed by Shanae Mojica MD at [facility] MAIN OR    HX BREAST BIOPSY Left     many years ago x 2 procedures    HX GASTRIC BYPASS  1995    HX HYSTERECTOMY  1980    HX LAP CHOLECYSTECTOMY  1978    HX MASTECTOMY Right 11/13/2018    RIGHT BREAST BIOPSY WITH NEEDLE LOCALIZATION (1030) performed by Shanae Mojica MD at [address] [facility]    HX MASTECTOMY Right 10/24/2019    EXCISIONAL DEBRIDEMENT OF RIGHT BREAST WOUND WITH ADVANCEMENT FLAP CLOSURE OF WOUND performed by Anna Osei MD at [facility] MAIN OR    HX TONSIL AND ADENOIDECTOMY      IR DRAIN CUTANEOUS/SUBCU ABSCESS Right 12/2018    Incision and drainage of right breast infected hematoma with abscess.     RI CARDIAC SURG PROCEDURE UNLIST       PCI to LAD 2011    RI DEBRIDEMENT OPEN WOUND EA ADDL 20 SQ CM Right 10/24/2019    EXCISIONAL DEBRIDEMENT OF RIGHT BREAST WOUND WITH ADVANCEMENT FLAP CLOSURE OF WOUND performed by Martha Lowe MD at \[facility]\"" MAIN OR      Social History     Socioeconomic History    Marital status:    Tobacco Use    Smoking status: Former     Packs/day: 1.00     Types: Cigarettes     Quit date: 1992     Years since quittin.1     Passive exposure: Never    Smokeless tobacco: Never   Substance and Sexual Activity    Alcohol use: Yes     Comment: maybe two to three times per year    Drug use: No      Family History   Problem Relation Age of Onset    Cancer Mother        Medications:   Prior to Admission medications    Medication Sig Start Date End Date Taking? Authorizing Provider   diphenhydrAMINE (BENADRYL) 25 mg capsule Take 25 mg by mouth every six (6) hours as needed. Yes Provider, Historical   ELIQUIS 5 mg tablet TK 1 T PO BID 19  Yes Provider, Historical   metFORMIN (GLUCOPHAGE) 500 mg tablet Take 500 mg by mouth two (2) times daily (with meals). Yes Provider, Historical   lisinopril-hydroCHLOROthiazide (PRINZIDE, ZESTORETIC) 20-12.5 mg per tablet Take 1 Tab by mouth daily. Yes Provider, Historical   simvastatin (ZOCOR) 40 mg tablet Take 40 mg by mouth nightly. Yes Provider, Historical   metoprolol tartrate (LOPRESSOR) 50 mg tablet Take 50 mg by mouth daily. Yes Provider, Historical       Physical Exam:   General: alert, no distress   HEENT: Head: Normocephalic, no lesions, without obvious abnormality.    Heart: regular rate and rhythm, S1, S2 normal, no murmur, click, rub or gallop   Lungs: chest clear, no wheezing, rales, normal symmetric air entry   Abdominal: soft, NT/ND + BS   Neurological: Grossly normal   Extremities: extremities normal, atraumatic, no cyanosis or edema     Findings/Diagnosis: GERD, CRC screening    Plan of Care/Planned Procedure: EGD and Colonoscopy

## (undated) DEVICE — INFECTION CONTROL KIT SYS

## (undated) DEVICE — STERILE POLYISOPRENE POWDER-FREE SURGICAL GLOVES: Brand: PROTEXIS

## (undated) DEVICE — SUTURE VCRL SZ 2-0 L12X18IN ABSRB UD POLYGLACTIN 910 BRAID J911T

## (undated) DEVICE — KIT CLN UP BON SECOURS MARYV

## (undated) DEVICE — SPONGE LAP 18X18IN STRL -- 5/PK

## (undated) DEVICE — INTENDED FOR TISSUE SEPARATION, AND OTHER PROCEDURES THAT REQUIRE A SHARP SURGICAL BLADE TO PUNCTURE OR CUT.: Brand: BARD-PARKER ® CARBON RIB-BACK BLADES

## (undated) DEVICE — REM POLYHESIVE ADULT PATIENT RETURN ELECTRODE: Brand: VALLEYLAB

## (undated) DEVICE — SOL INJ L R 1000ML BG --

## (undated) DEVICE — SOLUTION IRRIG 1000ML H2O STRL BLT

## (undated) DEVICE — APPLICATOR BNDG 1MM ADH PREMIERPRO EXOFIN

## (undated) DEVICE — HOOK LOCK LATEX FREE ELASTIC BANDAGE D/L 6INX10YD

## (undated) DEVICE — TOTAL TRAY, 16FR 10ML SIL FOLEY, URN: Brand: MEDLINE

## (undated) DEVICE — (D)STRIP SKN CLSR 0.5X4IN WHT --

## (undated) DEVICE — DRAPE FLD WRM W44XL66IN C6L FOR INTRATEMP SYS THERMABASIN

## (undated) DEVICE — SYR 10ML LUER LOK 1/5ML GRAD --

## (undated) DEVICE — KERLIX BANDAGE ROLL: Brand: KERLIX

## (undated) DEVICE — SOLUTION IRRIG 3000ML 0.9% SOD CHL FLX CONT 0797208] ICU MEDICAL INC]

## (undated) DEVICE — DBD-PACK,LAPAROTOMY,2 REINFORCED GOWNS: Brand: MEDLINE

## (undated) DEVICE — TRAY PREP DRY W/ PREM GLV 2 APPL 6 SPNG 2 UNDPD 1 OVERWRAP

## (undated) DEVICE — SOLUTION IV 1000ML 0.9% SOD CHL

## (undated) DEVICE — 3M™ WARMING BLANKET, LOWER BODY, 10 PER CASE, 42568: Brand: BAIR HUGGER™

## (undated) DEVICE — ROCKER SWITCH PENCIL BLADE ELECTRODE, HOLSTER: Brand: EDGE

## (undated) DEVICE — SUTURE PERMA-HAND SZ 3-0 L18IN NONABSORBABLE BLK L24MM PS-1 1684G

## (undated) DEVICE — (D)PACK ICE DISP -- DISC BY MFR

## (undated) DEVICE — DRAIN SURG W10MMXL20CM SIL FULL PERF HUBLESS FLAT RADPQ

## (undated) DEVICE — NEEDLE HYPO 25GA L1.5IN BVL ORIENTED ECLIPSE

## (undated) DEVICE — YANKAUER,BULB TIP,W/O VENT,RIGID,STERILE: Brand: MEDLINE

## (undated) DEVICE — INTENDED FOR TISSUE SEPARATION, AND OTHER PROCEDURES THAT REQUIRE A SHARP SURGICAL BLADE TO PUNCTURE OR CUT.: Brand: BARD-PARKER SAFETY BLADES SIZE 15, STERILE

## (undated) DEVICE — SUTURE VCRL SZ 4-0 L27IN ABSRB UD L19MM PS-2 3/8 CIR PRIM J426H

## (undated) DEVICE — HANDLE LT SNAP ON ULT DURABLE LENS FOR TRUMPF ALC DISPOSABLE

## (undated) DEVICE — DRAPE TWL SURG 16X26IN BLU ORB04] ALLCARE INC]

## (undated) DEVICE — 3M(TM) MEDIPORE(TM) +PAD SOFT CLOTH ADHESIVE WOUND DRESSING 3566: Brand: 3M™ MEDIPORE™

## (undated) DEVICE — SUTURE PERMA HND SZ 2 0 L18IN NONABSORBABLE BLK L19MM PS 2 583H

## (undated) DEVICE — SUTURE VCRL SZ 3-0 L27IN ABSRB UD L26MM SH 1/2 CIR J416H

## (undated) DEVICE — CATHETER ETER IV 20GA L125IN POLYUR STR RADPQ INTROCAN SFTY

## (undated) DEVICE — STRAP,POSITIONING,KNEE/BODY,FOAM,4X60": Brand: MEDLINE

## (undated) DEVICE — SUTURE ETHLN SZ 2-0 L18IN NONABSORBABLE BLK L19MM PS-2 PRIM 593H

## (undated) DEVICE — ABDOMINAL PAD: Brand: DERMACEA

## (undated) DEVICE — Device

## (undated) DEVICE — KIT,1200CC CANISTER,3/16"X6' TUBING: Brand: MEDLINE INDUSTRIES, INC.

## (undated) DEVICE — TUBING, SUCTION, 1/4" X 12', STRAIGHT: Brand: MEDLINE

## (undated) DEVICE — TOWEL SURG W17XL27IN STD BLU COT NONFENESTRATED PREWASHED

## (undated) DEVICE — SWAB CULT LIQ STUART AGR AERB MOD IN BRK SGL RAYON TIP PLAS 220099] BECTON DICKINSON MICRO]

## (undated) DEVICE — GAUZE SPONGES,USP TYPE VII GAUZE, 12 PLY: Brand: CURITY

## (undated) DEVICE — SURGICAL PROCEDURE PACK BASIN MAJ SET CUST NO CAUT

## (undated) DEVICE — 3M™ STERI-STRIP™ COMPOUND BENZOIN TINCTURE 40 BAGS/CARTON 4 CARTONS/CASE C1544: Brand: 3M™ STERI-STRIP™

## (undated) DEVICE — FLEX ADVANTAGE 3000CC: Brand: FLEX ADVANTAGE

## (undated) DEVICE — FILTER IV 5UM L1.75IN FLX STRW FOR FLD ASPIR FR GLS AMP

## (undated) DEVICE — SYSTEM EKG CBL L144IN 2 CONN 5 LD

## (undated) DEVICE — BASIN EMSIS 16OZ G PLAS KID SHP MOLD GRAD FOR ORAL HYG AND

## (undated) DEVICE — PACK PROCEDURE SURG MAJ W/ BASIN LF

## (undated) DEVICE — KENDALL SCD EXPRESS SLEEVES, KNEE LENGTH, MEDIUM: Brand: KENDALL SCD

## (undated) DEVICE — SUT SLK 2-0SH 30IN BLK --

## (undated) DEVICE — LARGE HEM-O-LOK CLIP APPLIER: Brand: ENDOWRIST

## (undated) DEVICE — GOWN,SIRUS,FABRNF,2XL,18/CS: Brand: MEDLINE

## (undated) DEVICE — GAUZE SPONGES,12 PLY: Brand: CURITY

## (undated) DEVICE — BASIN EMSIS 16OZ GRAPHITE PLAS KID SHP MOLD GRAD FOR ORAL

## (undated) DEVICE — GARMENT,MEDLINE,DVT,INT,CALF,MED, GEN2: Brand: MEDLINE

## (undated) DEVICE — 48" PROBE COVER W/GEL, ULTRASOUND, STERILE: Brand: SITE-RITE

## (undated) DEVICE — DRAPE,CHEST,FENES,15X10,STERIL: Brand: MEDLINE

## (undated) DEVICE — SUTURE MCRYL SZ 4-0 L18IN ABSRB UD L19MM PS-2 3/8 CIR PRIM Y496G

## (undated) DEVICE — DEVON™ KNEE AND BODY STRAP 60" X 3" (1.5 M X 7.6 CM): Brand: DEVON

## (undated) DEVICE — ADULT SPO2 SENSOR: Brand: NELLCOR

## (undated) DEVICE — CULTURETTE SGL EVAC TUBE PALL -- 100/CA

## (undated) DEVICE — ENDO CARRY-ON PROCEDURE KIT INCLUDES ENZYMATIC SPONGE, GAUZE, BIOHAZARD LABEL, TRAY, LUBRICANT, DIRTY SCOPE LABEL, WATER LABEL, TRAY, DRAWSTRING PAD, AND DEFENDO 4-PIECE KIT.: Brand: ENDO CARRY-ON PROCEDURE KIT

## (undated) DEVICE — 3M™ BAIR PAWS FLEX™ WARMING GOWN, STANDARD, 20 PER CASE 81003: Brand: BAIR PAWS™

## (undated) DEVICE — GOWN,SIRUS,NONRNF,SETINSLV,2XL,18/CS: Brand: MEDLINE